# Patient Record
Sex: FEMALE | Race: WHITE | NOT HISPANIC OR LATINO | Employment: FULL TIME | URBAN - METROPOLITAN AREA
[De-identification: names, ages, dates, MRNs, and addresses within clinical notes are randomized per-mention and may not be internally consistent; named-entity substitution may affect disease eponyms.]

---

## 2023-04-24 ENCOUNTER — TELEMEDICINE (OUTPATIENT)
Dept: PRIMARY CARE | Facility: CLINIC | Age: 58
End: 2023-04-24
Payer: COMMERCIAL

## 2023-04-24 DIAGNOSIS — R05.1 ACUTE COUGH: ICD-10-CM

## 2023-04-24 DIAGNOSIS — J01.90 ACUTE NON-RECURRENT SINUSITIS, UNSPECIFIED LOCATION: ICD-10-CM

## 2023-04-24 DIAGNOSIS — J06.9 ACUTE URI: Primary | ICD-10-CM

## 2023-04-24 PROBLEM — E03.9 HYPOTHYROIDISM: Status: ACTIVE | Noted: 2023-04-24

## 2023-04-24 PROBLEM — J30.9 ALLERGIC RHINITIS: Status: ACTIVE | Noted: 2023-04-24

## 2023-04-24 PROBLEM — M79.7 FIBROMYALGIA: Status: ACTIVE | Noted: 2023-04-24

## 2023-04-24 PROBLEM — E11.9 DIABETES MELLITUS TYPE 2, UNCOMPLICATED (MULTI): Status: ACTIVE | Noted: 2023-04-24

## 2023-04-24 PROBLEM — E55.9 VITAMIN D DEFICIENCY: Status: ACTIVE | Noted: 2023-04-24

## 2023-04-24 PROBLEM — K21.9 GERD (GASTROESOPHAGEAL REFLUX DISEASE): Status: ACTIVE | Noted: 2023-04-24

## 2023-04-24 PROBLEM — K51.90 ULCERATIVE COLITIS (MULTI): Status: ACTIVE | Noted: 2023-04-24

## 2023-04-24 PROBLEM — E78.5 HYPERLIPIDEMIA: Status: ACTIVE | Noted: 2023-04-24

## 2023-04-24 PROBLEM — E53.8 B12 DEFICIENCY: Status: ACTIVE | Noted: 2023-04-24

## 2023-04-24 PROBLEM — R63.5 WEIGHT GAIN: Status: ACTIVE | Noted: 2023-04-24

## 2023-04-24 PROBLEM — F32.A DEPRESSION: Status: ACTIVE | Noted: 2023-04-24

## 2023-04-24 PROBLEM — F41.9 ANXIETY: Status: ACTIVE | Noted: 2023-04-24

## 2023-04-24 PROBLEM — M25.50 ARTHRALGIA: Status: ACTIVE | Noted: 2023-04-24

## 2023-04-24 PROBLEM — S83.206A TEAR OF MENISCUS OF RIGHT KNEE: Status: RESOLVED | Noted: 2023-04-24 | Resolved: 2023-04-24

## 2023-04-24 PROBLEM — D64.9 ANEMIA: Status: ACTIVE | Noted: 2023-04-24

## 2023-04-24 PROBLEM — M47.817 SPONDYLOSIS OF LUMBOSACRAL REGION: Status: ACTIVE | Noted: 2023-04-24

## 2023-04-24 PROBLEM — S83.249A MEDIAL MENISCUS TEAR: Status: RESOLVED | Noted: 2023-04-24 | Resolved: 2023-04-24

## 2023-04-24 PROBLEM — E66.9 OBESITY: Status: ACTIVE | Noted: 2023-04-24

## 2023-04-24 PROBLEM — R52 BODY ACHES: Status: RESOLVED | Noted: 2023-04-24 | Resolved: 2023-04-24

## 2023-04-24 PROCEDURE — 99214 OFFICE O/P EST MOD 30 MIN: CPT | Performed by: STUDENT IN AN ORGANIZED HEALTH CARE EDUCATION/TRAINING PROGRAM

## 2023-04-24 RX ORDER — ROSUVASTATIN CALCIUM 10 MG/1
1 TABLET, COATED ORAL DAILY
COMMUNITY
Start: 2022-03-13 | End: 2023-06-22 | Stop reason: SDUPTHER

## 2023-04-24 RX ORDER — PREGABALIN 200 MG/1
200 CAPSULE ORAL 3 TIMES DAILY
COMMUNITY
Start: 2019-09-26 | End: 2024-02-01 | Stop reason: SDUPTHER

## 2023-04-24 RX ORDER — CYANOCOBALAMIN 1000 UG/ML
1 INJECTION, SOLUTION INTRAMUSCULAR; SUBCUTANEOUS
COMMUNITY
Start: 2023-01-24 | End: 2023-06-22 | Stop reason: SDUPTHER

## 2023-04-24 RX ORDER — BENZONATATE 200 MG/1
200 CAPSULE ORAL 3 TIMES DAILY PRN
Qty: 42 CAPSULE | Refills: 0 | Status: SHIPPED | OUTPATIENT
Start: 2023-04-24 | End: 2023-05-24

## 2023-04-24 RX ORDER — FLUTICASONE PROPIONATE 50 MCG
1 SPRAY, SUSPENSION (ML) NASAL 2 TIMES DAILY
COMMUNITY
Start: 2016-04-05 | End: 2023-06-22 | Stop reason: SDUPTHER

## 2023-04-24 RX ORDER — AMOXICILLIN AND CLAVULANATE POTASSIUM 875; 125 MG/1; MG/1
875 TABLET, FILM COATED ORAL 2 TIMES DAILY
Qty: 20 TABLET | Refills: 0 | Status: SHIPPED | OUTPATIENT
Start: 2023-04-24 | End: 2023-05-04

## 2023-04-24 RX ORDER — AZELASTINE 1 MG/ML
SPRAY, METERED NASAL
COMMUNITY
Start: 2021-05-21 | End: 2023-06-22 | Stop reason: SDUPTHER

## 2023-04-24 RX ORDER — DULOXETIN HYDROCHLORIDE 30 MG/1
30 CAPSULE, DELAYED RELEASE ORAL DAILY
COMMUNITY
Start: 2023-04-20 | End: 2023-12-13 | Stop reason: SDUPTHER

## 2023-04-24 RX ORDER — DULOXETIN HYDROCHLORIDE 60 MG/1
1 CAPSULE, DELAYED RELEASE ORAL DAILY
COMMUNITY
Start: 2023-04-20 | End: 2023-12-13 | Stop reason: SDUPTHER

## 2023-04-24 RX ORDER — TIRZEPATIDE 12.5 MG/.5ML
INJECTION, SOLUTION SUBCUTANEOUS
COMMUNITY
Start: 2022-11-28 | End: 2023-11-02 | Stop reason: WASHOUT

## 2023-04-24 RX ORDER — PANTOPRAZOLE SODIUM 40 MG/1
40 TABLET, DELAYED RELEASE ORAL DAILY
COMMUNITY
Start: 2015-05-07 | End: 2023-06-22 | Stop reason: SDUPTHER

## 2023-04-24 RX ORDER — LISDEXAMFETAMINE DIMESYLATE 40 MG/1
CAPSULE ORAL
COMMUNITY
Start: 2023-03-31 | End: 2023-11-21 | Stop reason: WASHOUT

## 2023-04-24 RX ORDER — LEVOTHYROXINE SODIUM 88 UG/1
88 TABLET ORAL DAILY
COMMUNITY
Start: 2018-08-10 | End: 2023-05-05 | Stop reason: SDUPTHER

## 2023-04-24 RX ORDER — DOXYCYCLINE 150 MG/1
150 TABLET ORAL DAILY
COMMUNITY
Start: 2023-04-20

## 2023-04-24 NOTE — PROGRESS NOTES
Subjective   Patient ID: Paola Manley is a 58 y.o. female who presents for Sick.    HPI   Patient is calling into the office via a telemedicine virtual visit to discuss signs/symptoms of an upper respiratory infection    Stated that approximately last Tuesday, 6-7 days ago she noted a cough that was slightly productive, headache, fatigue, body aches, fever, bilateral ear pressure, and the overall feeling of being slightly ill    She has been using over-the-counter Mucinex, Tylenol, and other OTC remedies with minimal relief  Continues to use her nasal sprays as well with continued signs/symptoms    She tested negative for COVID-19 this past Saturday, 2 days ago, but continues to notice increased signs/symptoms of ear pressure and even sinus pressure    Continues to take doxycycline daily for her rosacea but asking now if she needs to be placed on another antibiotic or something similar to help her signs/symptoms    Asking for advice/recommendations    Review of Systems  All pertinent positive symptoms are included in the history of present illness.    All other systems have been reviewed and are negative and noncontributory to this patient's current ailments.  Objective   There were no vitals taken for this visit.    Physical Exam  CONSTITUTIONAL - well nourished, well developed, looks like stated age, in no acute distress, not ill-appearing, and not tired appearing  SKIN - normal skin color and pigmentation, normal skin turgor without rash, lesions, or nodules visualized  HEAD - no trauma, normocephalic  EYES - normal external exam  CHEST -no distressed breathing, good effort, coughing occasionally during the examination  EXTREMITIES - no edema, no deformities  NEUROLOGICAL - normal balance, normal motor, no ataxia  PSYCHIATRIC - alert, pleasant and cordial, age-appropriate    Assessment/Plan   We do long conversation about your signs/symptoms and I truly believe that this could be due to a bacterial infection at  this time  I recommend that we start Augmentin 875 mg to be taken over the next 7-10 days    I also added Tessalon Perles to help with your cough    Risks, benefits, and options of treatment(s) were discussed after reviewing all current medication(s) and drug allergy(ies)  I opted for the treatment that we discussed with instructions on the medication use for your underlying medical ailment(s)    I encouraged supportive care such as rest, fluids and Advil/Tylenol as warranted  Return to the clinic in 3-5 days or sooner if symptoms persist as we will then further evaluate    If emergency warning signs/symptoms occur such as trouble breathing, persistent pain or pressure in the chest, new confusion, inability to wake or stay awake, or bluish lips or face, you need to seek emergency medical care immediately.

## 2023-05-05 DIAGNOSIS — E03.9 HYPOTHYROIDISM, UNSPECIFIED TYPE: ICD-10-CM

## 2023-05-05 RX ORDER — LEVOTHYROXINE SODIUM 88 UG/1
88 TABLET ORAL DAILY
Qty: 90 TABLET | Refills: 0 | Status: SHIPPED | OUTPATIENT
Start: 2023-05-05 | End: 2023-12-15 | Stop reason: SDUPTHER

## 2023-06-22 ENCOUNTER — LAB (OUTPATIENT)
Dept: LAB | Facility: LAB | Age: 58
End: 2023-06-22
Payer: COMMERCIAL

## 2023-06-22 ENCOUNTER — OFFICE VISIT (OUTPATIENT)
Dept: PRIMARY CARE | Facility: CLINIC | Age: 58
End: 2023-06-22
Payer: COMMERCIAL

## 2023-06-22 VITALS
HEART RATE: 77 BPM | BODY MASS INDEX: 31.55 KG/M2 | SYSTOLIC BLOOD PRESSURE: 120 MMHG | WEIGHT: 208.2 LBS | DIASTOLIC BLOOD PRESSURE: 76 MMHG | HEIGHT: 68 IN

## 2023-06-22 DIAGNOSIS — J30.9 ALLERGIC RHINITIS, UNSPECIFIED SEASONALITY, UNSPECIFIED TRIGGER: ICD-10-CM

## 2023-06-22 DIAGNOSIS — E53.8 B12 DEFICIENCY: ICD-10-CM

## 2023-06-22 DIAGNOSIS — E11.9 TYPE 2 DIABETES MELLITUS WITHOUT COMPLICATION, WITHOUT LONG-TERM CURRENT USE OF INSULIN (MULTI): ICD-10-CM

## 2023-06-22 DIAGNOSIS — K51.919 ULCERATIVE COLITIS WITH COMPLICATION, UNSPECIFIED LOCATION (MULTI): ICD-10-CM

## 2023-06-22 DIAGNOSIS — E78.2 MIXED HYPERLIPIDEMIA: ICD-10-CM

## 2023-06-22 DIAGNOSIS — E03.9 HYPOTHYROIDISM, UNSPECIFIED TYPE: ICD-10-CM

## 2023-06-22 DIAGNOSIS — K59.00 CONSTIPATION, UNSPECIFIED CONSTIPATION TYPE: ICD-10-CM

## 2023-06-22 DIAGNOSIS — K21.9 GASTROESOPHAGEAL REFLUX DISEASE WITHOUT ESOPHAGITIS: ICD-10-CM

## 2023-06-22 DIAGNOSIS — E11.9 TYPE 2 DIABETES MELLITUS WITHOUT COMPLICATION, WITHOUT LONG-TERM CURRENT USE OF INSULIN (MULTI): Primary | ICD-10-CM

## 2023-06-22 LAB
ALANINE AMINOTRANSFERASE (SGPT) (U/L) IN SER/PLAS: 14 U/L (ref 7–45)
ALBUMIN (G/DL) IN SER/PLAS: 4.2 G/DL (ref 3.4–5)
ALKALINE PHOSPHATASE (U/L) IN SER/PLAS: 81 U/L (ref 33–110)
ANION GAP IN SER/PLAS: 10 MMOL/L (ref 10–20)
ASPARTATE AMINOTRANSFERASE (SGOT) (U/L) IN SER/PLAS: 19 U/L (ref 9–39)
BILIRUBIN TOTAL (MG/DL) IN SER/PLAS: 0.3 MG/DL (ref 0–1.2)
CALCIUM (MG/DL) IN SER/PLAS: 9.1 MG/DL (ref 8.6–10.3)
CARBON DIOXIDE, TOTAL (MMOL/L) IN SER/PLAS: 25 MMOL/L (ref 21–32)
CHLORIDE (MMOL/L) IN SER/PLAS: 110 MMOL/L (ref 98–107)
CHOLESTEROL (MG/DL) IN SER/PLAS: 147 MG/DL (ref 0–199)
CHOLESTEROL IN HDL (MG/DL) IN SER/PLAS: 39.2 MG/DL
CHOLESTEROL/HDL RATIO: 3.8
COBALAMIN (VITAMIN B12) (PG/ML) IN SER/PLAS: 190 PG/ML (ref 211–911)
CREATININE (MG/DL) IN SER/PLAS: 0.78 MG/DL (ref 0.5–1.05)
ESTIMATED AVERAGE GLUCOSE FOR HBA1C: 111 MG/DL
GFR FEMALE: 88 ML/MIN/1.73M2
GLUCOSE (MG/DL) IN SER/PLAS: 89 MG/DL (ref 74–99)
HEMOGLOBIN A1C/HEMOGLOBIN TOTAL IN BLOOD: 5.5 %
LDL: 67 MG/DL (ref 0–99)
NON HDL CHOLESTEROL: 108 MG/DL
POTASSIUM (MMOL/L) IN SER/PLAS: 4.4 MMOL/L (ref 3.5–5.3)
PROTEIN TOTAL: 6.5 G/DL (ref 6.4–8.2)
SODIUM (MMOL/L) IN SER/PLAS: 141 MMOL/L (ref 136–145)
THYROTROPIN (MIU/L) IN SER/PLAS BY DETECTION LIMIT <= 0.05 MIU/L: 0.42 MIU/L (ref 0.44–3.98)
THYROXINE (T4) FREE (NG/DL) IN SER/PLAS: 1.15 NG/DL (ref 0.61–1.12)
TRIGLYCERIDE (MG/DL) IN SER/PLAS: 204 MG/DL (ref 0–149)
UREA NITROGEN (MG/DL) IN SER/PLAS: 24 MG/DL (ref 6–23)
VLDL: 41 MG/DL (ref 0–40)

## 2023-06-22 PROCEDURE — 3078F DIAST BP <80 MM HG: CPT | Performed by: STUDENT IN AN ORGANIZED HEALTH CARE EDUCATION/TRAINING PROGRAM

## 2023-06-22 PROCEDURE — 80061 LIPID PANEL: CPT

## 2023-06-22 PROCEDURE — 83036 HEMOGLOBIN GLYCOSYLATED A1C: CPT

## 2023-06-22 PROCEDURE — 36415 COLL VENOUS BLD VENIPUNCTURE: CPT

## 2023-06-22 PROCEDURE — 82607 VITAMIN B-12: CPT

## 2023-06-22 PROCEDURE — 3044F HG A1C LEVEL LT 7.0%: CPT | Performed by: STUDENT IN AN ORGANIZED HEALTH CARE EDUCATION/TRAINING PROGRAM

## 2023-06-22 PROCEDURE — 3074F SYST BP LT 130 MM HG: CPT | Performed by: STUDENT IN AN ORGANIZED HEALTH CARE EDUCATION/TRAINING PROGRAM

## 2023-06-22 PROCEDURE — 99215 OFFICE O/P EST HI 40 MIN: CPT | Performed by: STUDENT IN AN ORGANIZED HEALTH CARE EDUCATION/TRAINING PROGRAM

## 2023-06-22 PROCEDURE — 80053 COMPREHEN METABOLIC PANEL: CPT

## 2023-06-22 PROCEDURE — 84443 ASSAY THYROID STIM HORMONE: CPT

## 2023-06-22 PROCEDURE — 84439 ASSAY OF FREE THYROXINE: CPT

## 2023-06-22 RX ORDER — FLUTICASONE PROPIONATE 50 MCG
1 SPRAY, SUSPENSION (ML) NASAL 2 TIMES DAILY
Qty: 18.2 G | Refills: 3 | Status: SHIPPED | OUTPATIENT
Start: 2023-06-22 | End: 2023-12-15 | Stop reason: SDUPTHER

## 2023-06-22 RX ORDER — CYANOCOBALAMIN 1000 UG/ML
1000 INJECTION, SOLUTION INTRAMUSCULAR; SUBCUTANEOUS
Qty: 1 ML | Refills: 11 | Status: SHIPPED | OUTPATIENT
Start: 2023-06-22 | End: 2023-12-15 | Stop reason: SDUPTHER

## 2023-06-22 RX ORDER — PANTOPRAZOLE SODIUM 40 MG/1
40 TABLET, DELAYED RELEASE ORAL DAILY
Qty: 90 TABLET | Refills: 1 | Status: SHIPPED | OUTPATIENT
Start: 2023-06-22 | End: 2023-12-15 | Stop reason: SDUPTHER

## 2023-06-22 RX ORDER — ROSUVASTATIN CALCIUM 10 MG/1
10 TABLET, COATED ORAL DAILY
Qty: 90 TABLET | Refills: 1 | Status: SHIPPED | OUTPATIENT
Start: 2023-06-22 | End: 2023-12-15 | Stop reason: SDUPTHER

## 2023-06-22 RX ORDER — AZELASTINE 1 MG/ML
1 SPRAY, METERED NASAL 2 TIMES DAILY
Qty: 30 ML | Refills: 3 | Status: SHIPPED | OUTPATIENT
Start: 2023-06-22 | End: 2023-12-15 | Stop reason: SDUPTHER

## 2023-06-22 NOTE — PROGRESS NOTES
Subjective   Patient ID: Paola Manley is a 58 y.o. female who presents for Diabetes, Allergic Rhinitis, Hyperlipidemia, Hypothyroidism, GERD, and Vitamin B12 Deficiency .  Today she is accompanied by alone.     HPI  1. Type 2 Diabetes  She was recently started on Mounjaro in November 2022 due to weight loss clinic.  Her last hemoglobin A1c was performed in January and showed a value of 5.7%  Not currently on an ACE inhibitor or ARB.  She is tolerating her medication.  Currently down 50+ pounds  Denies polydipsia or dry mouth.    2. Allergic rhinitis  She continues to use Flonase and Astelin as needed.  Requesting refills of both her Astelin and fluticasone  Denies sinus pressure, fever, chills, sore throat, chest pain, or shortness of breath.    3.  Constipation/ulcerative colitis  She is taking Linzess 290 mcg as needed.  Also noted that she is following up with Dr. Holman and recently established with them just a few days ago  She states that this helps when she notices that she is getting more constipated.  Requesting refills.    4. Hyperlipidemia  Currently taking rosuvastatin 10 mg daily.  She is tolerating the medication, denies myalgias.  Last cholesterol was great and stable with an LDL near 70    5. Hypothyroidism  Currently taking levothyroxine 88 mcg daily.  Last TSH performed earlier this year showed a very low TSH  Due to this we decrease to 88 mcg daily  This is most likely due to weight loss  Desires to have her TSH checked again.    6.  GERD  Currently takes pantoprazole 40 mg daily.  States that this medication continues to help with her symptoms, and knows when she forgets to take her medication that her symptoms returns.  Last endoscopy was in 2016  Again, currently following up with gastroenterology    7. Vitamin B12 deficiency  In the past she has been receiving vitamin B12 injections.  However, since the beginning of COVID-19 pandemic, she did not want to come to the office monthly and had  stopped getting her vitamin B12 shots.  Her B12 levels in October 2022 were low at 122.  She desires to restart her vitamin B12 injections.      Current Outpatient Medications on File Prior to Visit   Medication Sig Dispense Refill    azelastine (Astelin) 137 mcg (0.1 %) nasal spray Administer into affected nostril(s).      cyanocobalamin (Vitamin B-12) 1,000 mcg/mL injection Inject 1 mL (1,000 mcg) into the shoulder, thigh, or buttocks every 30 (thirty) days.      doxycycline (Adoxa) 150 mg tablet Take 1 tablet (150 mg) by mouth once daily.      DULoxetine (Cymbalta) 30 mg DR capsule Take 1 capsule (30 mg) by mouth once daily.      DULoxetine (Cymbalta) 60 mg DR capsule Take 1 capsule (60 mg) by mouth once daily.      fluticasone (Flonase) 50 mcg/actuation nasal spray Administer 1 spray into affected nostril(s) in the morning and 1 spray before bedtime.      levothyroxine (Synthroid, Levoxyl) 88 mcg tablet Take 1 tablet (88 mcg) by mouth once daily. 90 tablet 0    linaCLOtide (Linzess) 290 mcg capsule Take 1 capsule (290 mcg) by mouth once daily.      pantoprazole (ProtoNix) 40 mg EC tablet Take 1 tablet (40 mg) by mouth once daily.      pregabalin (Lyrica) 200 mg capsule Take 1 capsule (200 mg) by mouth in the morning and 1 capsule (200 mg) in the evening and 1 capsule (200 mg) before bedtime.      rosuvastatin (Crestor) 10 mg tablet Take 1 tablet (10 mg) by mouth once daily.      tirzepatide (Mounjaro) 12.5 mg/0.5 mL pen injector Inject under the skin.      Vyvanse 40 mg capsule TAKE ONE (1) CAPSULE EVERY MORNING       No current facility-administered medications on file prior to visit.        Allergies   Allergen Reactions    Bactrim [Sulfamethoxazole-Trimethoprim] Other    Cephalexin Unknown     GASTROINTESTIAL IRRITATION    Ciprofloxacin Unknown     Reaction from Community: Other(See Desc)    Darvocet A500 [Propoxyphene N-Acetaminophen] Other    Nsaids (Non-Steroidal Anti-Inflammatory Drug) Other    Vicodin  "[Hydrocodone-Acetaminophen] Other       Immunization History   Administered Date(s) Administered    Hib (PRP-D) 12/21/2012    Influenza, injectable, quadrivalent, preservative free 10/11/2017, 12/20/2018, 09/28/2020, 11/24/2021    Influenza, seasonal, injectable 01/07/2015, 10/06/2015    Influenza, seasonal, injectable, preservative free 11/12/2013    Meningococcal MCV4, Unspecified 12/21/2012    Moderna SARS-CoV-2 Vaccination 04/08/2021, 05/06/2021, 01/17/2022    Pneumococcal Conjugate PCV 7 12/21/2012    Tdap 11/11/2013    Zoster, Recombinant 01/17/2022, 01/24/2023         Review of Systems  All pertinent positive symptoms are included in the history of present illness.  All other systems have been reviewed and are negative and noncontributory to this patient's current ailments.     Objective   /76 (BP Location: Left arm, Patient Position: Sitting, BP Cuff Size: Adult)   Pulse 77   Ht 1.727 m (5' 8\")   Wt 94.4 kg (208 lb 3.2 oz)   BMI 31.66 kg/m²   BSA: 2.13 meters squared  No visits with results within 1 Month(s) from this visit.   Latest known visit with results is:   [facility]acy Encounter on 01/24/2023   Component Date Value Ref Range Status    ALBUMIN (MG/L) IN URINE 01/24/2023 <7.0  Not Established mg/L Final    Albumin/Creatine Ratio 01/24/2023 SEE COMMENT  0.0 - 30.0 ug/mg crt Final    Comment: One or more analytes used in this calculation   is outside of the analytical measurement range.  Calculation cannot be performed.      Creatinine, Urine 01/24/2023 64.0  20.0 - 320.0 mg/dL Final    Glucose 01/24/2023 86  74 - 99 mg/dL Final    Sodium 01/24/2023 140  136 - 145 mmol/L Final    Potassium 01/24/2023 4.6  3.5 - 5.3 mmol/L Final    Chloride 01/24/2023 107  98 - 107 mmol/L Final    Bicarbonate 01/24/2023 28  21 - 32 mmol/L Final    Anion Gap 01/24/2023 10  10 - 20 mmol/L Final    Urea Nitrogen 01/24/2023 19  6 - 23 mg/dL Final    Creatinine 01/24/2023 0.69  0.50 - 1.05 mg/dL Final    GFR Female " 01/24/2023 >90  >90 mL/min/1.73m2 Final    Comment:  CALCULATIONS OF ESTIMATED GFR ARE PERFORMED   USING THE 2021 CKD-EPI STUDY REFIT EQUATION   WITHOUT THE RACE VARIABLE FOR THE IDMS-TRACEABLE   CREATININE METHODS.    https://jasn.asnjournals.org/content/early/2021/09/22/ASN.0359354432      Calcium 01/24/2023 9.4  8.6 - 10.3 mg/dL Final    Albumin 01/24/2023 4.3  3.4 - 5.0 g/dL Final    Alkaline Phosphatase 01/24/2023 97  33 - 110 U/L Final    Total Protein 01/24/2023 7.4  6.4 - 8.2 g/dL Final    AST 01/24/2023 17  9 - 39 U/L Final    Total Bilirubin 01/24/2023 0.3  0.0 - 1.2 mg/dL Final    ALT (SGPT) 01/24/2023 13  7 - 45 U/L Final    Comment:  Patients treated with Sulfasalazine may generate    falsely decreased results for ALT.      TSH 01/24/2023 0.06 (L)  0.44 - 3.98 mIU/L Final    Comment:  TSH testing is performed using different testing    methodology at [facility] than at other    [facility] hospitals. Direct result comparisons should    only be made within the same method.      Hemoglobin A1C 01/24/2023 5.7 (A)  % Final    Comment:      Diagnosis of Diabetes-Adults   Non-Diabetic: < or = 5.6%   Increased risk for developing diabetes: 5.7-6.4%   Diagnostic of diabetes: > or = 6.5%  .       Monitoring of Diabetes                Age (y)     Therapeutic Goal (%)   Adults:          >18           <7.0   Pediatrics:    13-18           <7.5                   7-12           <8.0                   0- 6            7.5-8.5   American Diabetes Association. Diabetes Care 33(S1), Jan 2010.      Estimated Average Glucose 01/24/2023 117  MG/DL Final    Cholesterol 01/24/2023 137  0 - 199 mg/dL Final    Comment: .      AGE      DESIRABLE   BORDERLINE HIGH   HIGH     0-19 Y     0 - 169       170 - 199     >/= 200    20-24 Y     0 - 189       190 - 224     >/= 225         >24 Y     0 - 199       200 - 239     >/= 240   **All ranges are based on fasting samples. Specific   therapeutic targets will vary based on  patient-specific   cardiac risk.  .   Pediatric guidelines reference:Pediatrics 2011, 128(S5).   Adult guidelines reference: NCEP ATPIII Guidelines,     [place] 2001, 258:2486-97  .   Venipuncture immediately after or during the    administration of Metamizole may lead to falsely   low results. Testing should be performed immediately   prior to Metamizole dosing.      HDL 01/24/2023 47.8  mg/dL Final    Comment: .      AGE      VERY LOW   LOW     NORMAL    HIGH       0-19 Y       < 35   < 40     40-45     ----    20-24 Y       ----   < 40       >45     ----      >24 Y       ----   < 40     40-60      >60  .      Cholesterol/HDL Ratio 01/24/2023 2.9   Final    Comment: REF VALUES  DESIRABLE  < 3.4  HIGH RISK  > 5.0      LDL 01/24/2023 74  0 - 99 mg/dL Final    Comment: .                           NEAR      BORD      AGE      DESIRABLE  OPTIMAL    HIGH     HIGH     VERY HIGH     0-19 Y     0 - 109     ---    110-129   >/= 130     ----    20-24 Y     0 - 119     ---    120-159   >/= 160     ----      >24 Y     0 -  99   100-129  130-159   160-189     >/=190  .      VLDL 01/24/2023 15  0 - 40 mg/dL Final    Triglycerides 01/24/2023 77  0 - 149 mg/dL Final    Comment: .      AGE      DESIRABLE   BORDERLINE HIGH   HIGH     VERY HIGH   0 D-90 D    19 - 174         ----         ----        ----  91 D- 9 Y     0 -  74        75 -  99     >/= 100      ----    10-19 Y     0 -  89        90 - 129     >/= 130      ----    20-24 Y     0 - 114       115 - 149     >/= 150      ----         >24 Y     0 - 149       150 - 199    200- 499    >/= 500  .   Venipuncture immediately after or during the    administration of Metamizole may lead to falsely   low results. Testing should be performed immediately   prior to Metamizole dosing.      WBC 01/24/2023 8.0  4.4 - 11.3 x10E9/L Final    RBC 01/24/2023 4.00  4.00 - 5.20 x10E12/L Final    Hemoglobin 01/24/2023 12.6  12.0 - 16.0 g/dL Final    Hematocrit 01/24/2023 38.9  36.0 - 46.0 % Final     MCV 01/24/2023 97  80 - 100 fL Final    MCHC 01/24/2023 32.4  32.0 - 36.0 g/dL Final    Platelets 01/24/2023 284  150 - 450 x10E9/L Final    RDW 01/24/2023 14.8 (H)  11.5 - 14.5 % Final    Neutrophils % 01/24/2023 44.0  40.0 - 80.0 % Final    Immature Granulocytes %, Automated 01/24/2023 0.4  0.0 - 0.9 % Final    Comment:  Immature Granulocyte Count (IG) includes promyelocytes,    myelocytes and metamyelocytes but does not include bands.   Percent differential counts (%) should be interpreted in the   context of the absolute cell counts (cells/L).      Lymphocytes % 01/24/2023 41.1  13.0 - 44.0 % Final    Monocytes % 01/24/2023 8.1  2.0 - 10.0 % Final    Eosinophils % 01/24/2023 5.8  0.0 - 6.0 % Final    Basophils % 01/24/2023 0.6  0.0 - 2.0 % Final    Neutrophils Absolute 01/24/2023 3.52  1.20 - 7.70 x10E9/L Final    Lymphocytes Absolute 01/24/2023 3.28  1.20 - 4.80 x10E9/L Final    Monocytes Absolute 01/24/2023 0.65  0.10 - 1.00 x10E9/L Final    Eosinophils Absolute 01/24/2023 0.46  0.00 - 0.70 x10E9/L Final    Basophils Absolute 01/24/2023 0.05  0.00 - 0.10 x10E9/L Final       Physical Exam  CONSTITUTIONAL - well nourished, well developed, looks like stated age, in no acute distress, not ill-appearing, and not tired appearing  SKIN - normal skin color and pigmentation, normal skin turgor without rash, lesions, or nodules visualized  HEAD - no trauma, normocephalic  EYES - normal external exam  CHEST -no distressed breathing, good effort  EXTREMITIES - no edema, no deformities  NEUROLOGICAL - normal balance, normal motor, no ataxia  PSYCHIATRIC - alert, pleasant and cordial, age-appropriate    Assessment/Plan   1. Type 2 Diabetes  Continue Mounjaro from the weight loss clinic.  We have ordered a hemoglobin A1c today, we will notify you of the results and make recommendations accordingly.     2. Allergic rhinitis  happy to hear that your allergies appear to be well controlled with Flonase and Astelin.  I will send  in other prescriptions at this time    3.  Constipation  Refills provided for Linzess 290 mcg daily as needed.    4. Hyperlipidemia  Your cholesterol was within normal limits in January of this year  We did order a lipid panel and CMP today.  We will notify you of the results and make recommendations accordingly    5. Hypothyroidism  Happy to hear that your symptoms are feeling much better now, we will check your TSH levels to determine if we need to make modifications to your levothyroxine.  We will notify you of the results and make recommendations accordingly stable.  Otherwise, continue 88 mcg daily as currently prescribed    6. Esophageal reflux  Stable, continues to take pantoprazole 40 mg daily.    7. Vitamin B12 deficiency  Your vitamin B12 levels back in October 2022, were low.  I have sent a prescription to restart your vitamin B12 injections.  We will be happy to provide your injections in office.

## 2023-06-23 ENCOUNTER — TELEPHONE (OUTPATIENT)
Dept: PRIMARY CARE | Facility: CLINIC | Age: 58
End: 2023-06-23
Payer: COMMERCIAL

## 2023-06-23 NOTE — TELEPHONE ENCOUNTER
----- Message from Addy Storm, DO sent at 6/23/2023  6:45 AM EDT -----  Vitamin B12 low at 190 so I would recommend we start supplementation and we can easily provide these at the office    Free T4 elevated 1.15 and thyroid-stimulating hormone noted to be at the very low end of normal at 0.42  If the patient feels well, she can continue at 88 mcg daily but it may be even wise to decrease her further to 75 mcg daily    Please advise as she continues to lose weight and this could be affected once again    Cholesterol looks great at 147, HDL 39, LDL 67, triglycerides 204    Sugar, kidneys, liver, electrolytes are all within normal limits other than chloride being slightly elevated but I am not concerned    Hemoglobin A1c decreased tremendously now at 5.5%

## 2023-06-23 NOTE — RESULT ENCOUNTER NOTE
Vitamin B12 low at 190 so I would recommend we start supplementation and we can easily provide these at the office    Free T4 elevated 1.15 and thyroid-stimulating hormone noted to be at the very low end of normal at 0.42  If the patient feels well, she can continue at 88 mcg daily but it may be even wise to decrease her further to 75 mcg daily    Please advise as she continues to lose weight and this could be affected once again    Cholesterol looks great at 147, HDL 39, LDL 67, triglycerides 204    Sugar, kidneys, liver, electrolytes are all within normal limits other than chloride being slightly elevated but I am not concerned    Hemoglobin A1c decreased tremendously now at 5.5%

## 2023-06-29 ENCOUNTER — APPOINTMENT (OUTPATIENT)
Dept: PRIMARY CARE | Facility: CLINIC | Age: 58
End: 2023-06-29
Payer: COMMERCIAL

## 2023-08-04 ENCOUNTER — HOSPITAL ENCOUNTER (OUTPATIENT)
Dept: DATA CONVERSION | Facility: HOSPITAL | Age: 58
End: 2023-08-04
Attending: PHYSICAL MEDICINE & REHABILITATION | Admitting: PHYSICAL MEDICINE & REHABILITATION
Payer: COMMERCIAL

## 2023-08-04 DIAGNOSIS — M79.7 FIBROMYALGIA: ICD-10-CM

## 2023-08-04 DIAGNOSIS — M46.1 SACROILIITIS, NOT ELSEWHERE CLASSIFIED (CMS-HCC): ICD-10-CM

## 2023-09-08 ENCOUNTER — HOSPITAL ENCOUNTER (OUTPATIENT)
Dept: DATA CONVERSION | Facility: HOSPITAL | Age: 58
End: 2023-09-08
Attending: RADIOLOGY | Admitting: RADIOLOGY
Payer: COMMERCIAL

## 2023-09-08 DIAGNOSIS — K86.2 CYST OF PANCREAS (HHS-HCC): ICD-10-CM

## 2023-09-19 LAB — ALCOHOL (MG/DL) IN URINE: <10 MG/DL

## 2023-09-22 LAB
6-ACETYLMORPHINE: <25 NG/ML
7-AMINOCLONAZEPAM: <25 NG/ML
ALPHA-HYDROXYALPRAZOLAM: <25 NG/ML
ALPHA-HYDROXYMIDAZOLAM: <25 NG/ML
ALPRAZOLAM: <25 NG/ML
AMPHETAMINE (PRESENCE) IN URINE BY SCREEN METHOD: ABNORMAL
BARBITURATES PRESENCE IN URINE BY SCREEN METHOD: ABNORMAL
CANNABINOIDS IN URINE BY SCREEN METHOD: ABNORMAL
CHLORDIAZEPOXIDE: <25 NG/ML
CLONAZEPAM: <25 NG/ML
COCAINE (PRESENCE) IN URINE BY SCREEN METHOD: ABNORMAL
CODEINE: <50 NG/ML
CREATINE, URINE FOR DRUG: 98.1 MG/DL
DIAZEPAM: <25 NG/ML
DRUG SCREEN COMMENT URINE: ABNORMAL
EDDP: <25 NG/ML
ETHYL GLUCURONIDE SCREEN: NEGATIVE NG/ML
FENTANYL CONFIRMATION, URINE: <2.5 NG/ML
HYDROCODONE: <25 NG/ML
HYDROMORPHONE: <25 NG/ML
LORAZEPAM: <25 NG/ML
METHADONE CONFIRMATION,URINE: <25 NG/ML
MIDAZOLAM: <25 NG/ML
MORPHINE URINE: <50 NG/ML
N-DESMETHYLTAPENTADOL: <25 NG/ML
NORDIAZEPAM: <25 NG/ML
NORFENTANYL: <2.5 NG/ML
NORHYDROCODONE: <25 NG/ML
NOROXYCODONE: <25 NG/ML
O-DESMETHYLTRAMADOL: <50 NG/ML
OXAZEPAM: <25 NG/ML
OXYCODONE: <25 NG/ML
OXYMORPHONE: <25 NG/ML
PHENCYCLIDINE (PRESENCE) IN URINE BY SCREEN METHOD: ABNORMAL
TAPENTADOL: <25 NG/ML
TEMAZEPAM: <25 NG/ML
TRAMADOL: <50 NG/ML
ZOLPIDEM METABOLITE (ZCA): <25 NG/ML
ZOLPIDEM: <25 NG/ML

## 2023-09-25 LAB
AMPHETAMINES,URINE: 4500 NG/ML
BUPRENORPHINE ,URINE: <2 NG/ML
BUPRENORPHINE GLUC, URINE: <5 NG/ML
MDA,URINE: <200 NG/ML
MDEA,URINE: <200 NG/ML
MDMA,UR: <200 NG/ML
METHAMPHETAMINE QUANTITATIVE URINE: <200 NG/ML
NALOXONE, URINE: <100 NG/ML
NORBUPRENORPHINE GLUC,URINE: <5 NG/ML
NORBUPRENORPHINE, URINE: <2 NG/ML
PHENTERMINE,UR: <200 NG/ML

## 2023-09-29 VITALS
HEART RATE: 82 BPM | RESPIRATION RATE: 19 BRPM | TEMPERATURE: 98.1 F | DIASTOLIC BLOOD PRESSURE: 79 MMHG | SYSTOLIC BLOOD PRESSURE: 135 MMHG

## 2023-09-30 NOTE — H&P
History of Present Illness:   Pregnant/Lactating:  ·  Are You Pregnant no (1)   ·  Are You Currently Breastfeeding no     History Present Illness:  Reason for surgery: Pain   HPI:    Patient is a 58-year-old female past medical history significant for fibromyalgia, chronic low back pain who presents for follow-up visit since  September 2022 status post bilateral SI joint injections with significant pain relief for the past couple nights. Patient states her symptoms have reappeared over the past month and have been increasingly getting worse. Physical exam reveals SI joint  dysfunction which could be the cause of her worsening/aggravating of her low back pain. No radicular symptoms on examination or HPI thus less concerning for disc herniation or any nerve compression. Patient currently on Lyrica 200 mg 3 times daily for  fibromyalgia/neuropathic pain. Patient is also on Cymbalta which she has been taking without any side effects. We will continue both medications for her fibromyalgia treatment plan given patient's responsiveness and no side effect profile.     Plan for today:    -Given the patient responded well to bilateral SI joint injections in September 2022 with 9-month history of pain relief she would benefit from repeat injections. We discussed  the procedure, side effects, benefits and expectations with the patient and she was agreeable to proceed.     Allergies:        Allergies:  ·  Vicodin : Itching  ·  Darvocet-N  100: Itching  ·  cephalexin : Other  ·  ciprofloxacin : Unknown,  Medical Practices  ·  Darvocet  A500: Unknown, [facility]       Intolerances:  ·  Bactrim : GI Upset, Fatigue  ·  Keflex : GI Upset  ·  NSAIDs : GI Upset    Home Medication Review:   Home Medications Reviewed: yes     Impression/Procedure:   ·  Impression and Planned Procedure: Bilateral SI joint injections       ERAS (Enhanced Recovery After Surgery):  ·  ERAS Patient: no       Vital Signs:  Temperature C: 36.7 degrees C  "  Temperature F: 98 degrees F   Heart Rate: 82 beats per minute   Respiratory Rate: 19 breath per minute   Blood Pressure Systolic: 135 mm/Hg   Blood Pressure Diastolic: 79 mm/Hg     Physical Exam by System:    Constitutional: Well developed, awake/alert/oriented  x3, no distress, alert and cooperative   Eyes: PERRL, EOMI, clear sclera   ENMT: mucous membranes moist, no apparent injury,  no lesions seen   Respiratory/Thorax: Patent airways, CTAB, normal  breath sounds with good chest expansion, thorax symmetric   Cardiovascular: Regular, rate and rhythm, no murmurs,  2+ equal pulses of the extremities, normal S 1and S 2   Skin: Warm and dry, no lesions, no rashes     Consent:   COVID-19 Consent:  ·  COVID-19 Risk Consent Surgeon has reviewed key risks related to the risk of ankur COVID-19 and if they contract COVID-19 what the risks are.     Attestation:   Note Completion:  I am a:  Resident/Fellow   Attending Attestation I saw and evaluated the patient.  I personally obtained the key and critical portions of the history and physical exam or was physically present for key and  critical portions performed by the resident/fellow. I reviewed the resident/fellow?s documentation and discussed the patient with the resident/fellow.  I agree with the resident/fellow?s medical decision making as documented in the note.   I personally evaluated the patient on 04-Aug-2023         Electronic Signatures:  Gil Matthew (MD)  (Signed 04-Aug-2023 10:54)   Co-Signer: History of Present Illness, Allergies, Home  Medication Review, Impression/Procedure, ERAS, Physical Exam, Consent, Note Completion  Jonh Li (Fellow))  (Signed 04-Aug-2023 10:53)   Authored: History of Present Illness, Allergies, Home  Medication Review, Impression/Procedure, ERAS, Physical Exam, Consent, Note Completion      Last Updated: 04-Aug-2023 10:54 by Gil Matthew (MD)    References:  1.  Data Referenced From \"Patient Profile - " "Procedure\" 04-Aug-2023 09:56   "

## 2023-10-01 NOTE — OP NOTE
Post Operative Note:     PreOp Diagnosis: Bilateral sacroiliitis   Post-Procedure Diagnosis: Bilateral sacroiliitis   Procedure: 1.  Bilateral SI joint injection  2.   3.   4.   5.   Surgeon: Gil Matthew MD   Resident/Fellow/Other Assistant: Jonh Li MD   Estimated Blood Loss (mL): none   Specimen: no   Complications: none apparent   Findings: expected anatomy     Operative Report Dictated:  Dictation: not applicable - note contains Operative  Report   Operative Report:    Following informed consent, the patient was operating room and placed in the prone position. The low back area was prepped and draped with chlorhexidine in sterile  fashion.  Using fluoroscopic guidance, the skin and subcutaneous tissue overlying needle trajectory to the lower aspect of the sacroiliac joint was anesthetized with 3 cc of 0.5% Lidocaine.  A 23-gauge spinal needle was then advanced under fluoroscopic  guidance the lower aspect of the sacroiliac joint.  Injection of a small amount of contrast with appropriate intra-articular/periarticular spread.  Thereafter the below medications were injected and the needle was removed.  The patient was then transferred  to the recovery room in stable condition.    The patient is to continue with physical therapy/home exercises and update us on response/progress.    Laterality: [Bilateral]    Medication(s):  [3mL] [0.5% lidocaine] [40 mg methylprednisolone] divided between site(s)    Attestation:   Note Completion:  Attending Attestation I was present for the entire procedure         Electronic Signatures:  Gil Matthew)  (Signed 04-Aug-2023 11:05)   Authored: Post Operative Note, Note Completion      Last Updated: 04-Aug-2023 11:05 by Gil Matthew)

## 2023-10-28 PROBLEM — N20.0 RENAL CALCULI: Status: ACTIVE | Noted: 2023-10-28

## 2023-10-28 RX ORDER — MELATONIN 10 MG
CAPSULE ORAL
COMMUNITY

## 2023-10-28 RX ORDER — CLONIDINE HYDROCHLORIDE 0.2 MG/1
TABLET ORAL
COMMUNITY
Start: 2023-08-22

## 2023-10-30 ENCOUNTER — HOSPITAL ENCOUNTER (OUTPATIENT)
Dept: GASTROENTEROLOGY | Facility: HOSPITAL | Age: 58
Setting detail: OUTPATIENT SURGERY
Discharge: HOME | End: 2023-10-30
Payer: COMMERCIAL

## 2023-10-30 ENCOUNTER — ANESTHESIA EVENT (OUTPATIENT)
Dept: GASTROENTEROLOGY | Facility: HOSPITAL | Age: 58
End: 2023-10-30
Payer: COMMERCIAL

## 2023-10-30 ENCOUNTER — ANESTHESIA (OUTPATIENT)
Dept: GASTROENTEROLOGY | Facility: HOSPITAL | Age: 58
End: 2023-10-30
Payer: COMMERCIAL

## 2023-10-30 VITALS
WEIGHT: 200 LBS | RESPIRATION RATE: 19 BRPM | TEMPERATURE: 99.3 F | HEIGHT: 68 IN | DIASTOLIC BLOOD PRESSURE: 65 MMHG | SYSTOLIC BLOOD PRESSURE: 128 MMHG | OXYGEN SATURATION: 99 % | BODY MASS INDEX: 30.31 KG/M2 | HEART RATE: 57 BPM

## 2023-10-30 DIAGNOSIS — Z12.11 ENCOUNTER FOR SCREENING FOR MALIGNANT NEOPLASM OF COLON: ICD-10-CM

## 2023-10-30 LAB
GLUCOSE BLD MANUAL STRIP-MCNC: 79 MG/DL (ref 74–99)
GLUCOSE BLD MANUAL STRIP-MCNC: 80 MG/DL (ref 74–99)

## 2023-10-30 PROCEDURE — A45378 PR COLONOSCOPY,DIAGNOSTIC: Performed by: NURSE ANESTHETIST, CERTIFIED REGISTERED

## 2023-10-30 PROCEDURE — 2500000004 HC RX 250 GENERAL PHARMACY W/ HCPCS (ALT 636 FOR OP/ED): Performed by: NURSE ANESTHETIST, CERTIFIED REGISTERED

## 2023-10-30 PROCEDURE — 88305 TISSUE EXAM BY PATHOLOGIST: CPT | Mod: TC | Performed by: INTERNAL MEDICINE

## 2023-10-30 PROCEDURE — 3700000002 HC GENERAL ANESTHESIA TIME - EACH INCREMENTAL 1 MINUTE

## 2023-10-30 PROCEDURE — 3700000001 HC GENERAL ANESTHESIA TIME - INITIAL BASE CHARGE

## 2023-10-30 PROCEDURE — 7100000010 HC PHASE TWO TIME - EACH INCREMENTAL 1 MINUTE

## 2023-10-30 PROCEDURE — 45380 COLONOSCOPY AND BIOPSY: CPT | Performed by: INTERNAL MEDICINE

## 2023-10-30 PROCEDURE — 7100000009 HC PHASE TWO TIME - INITIAL BASE CHARGE

## 2023-10-30 PROCEDURE — 88305 TISSUE EXAM BY PATHOLOGIST: CPT | Performed by: PATHOLOGY

## 2023-10-30 PROCEDURE — 82947 ASSAY GLUCOSE BLOOD QUANT: CPT

## 2023-10-30 PROCEDURE — A45378 PR COLONOSCOPY,DIAGNOSTIC: Performed by: ANESTHESIOLOGY

## 2023-10-30 PROCEDURE — 88305 TISSUE EXAM BY PATHOLOGIST: CPT | Mod: TC,SUR | Performed by: INTERNAL MEDICINE

## 2023-10-30 RX ORDER — PROPOFOL 10 MG/ML
INJECTION, EMULSION INTRAVENOUS CONTINUOUS PRN
Status: DISCONTINUED | OUTPATIENT
Start: 2023-10-30 | End: 2023-10-30

## 2023-10-30 RX ORDER — PROPOFOL 10 MG/ML
INJECTION, EMULSION INTRAVENOUS AS NEEDED
Status: DISCONTINUED | OUTPATIENT
Start: 2023-10-30 | End: 2023-10-30

## 2023-10-30 RX ORDER — SODIUM CHLORIDE, SODIUM LACTATE, POTASSIUM CHLORIDE, CALCIUM CHLORIDE 600; 310; 30; 20 MG/100ML; MG/100ML; MG/100ML; MG/100ML
INJECTION, SOLUTION INTRAVENOUS CONTINUOUS PRN
Status: DISCONTINUED | OUTPATIENT
Start: 2023-10-30 | End: 2023-10-30

## 2023-10-30 RX ADMIN — PROPOFOL 150 MCG/KG/MIN: 10 INJECTION, EMULSION INTRAVENOUS at 11:10

## 2023-10-30 RX ADMIN — PROPOFOL 20 MG: 10 INJECTION, EMULSION INTRAVENOUS at 11:14

## 2023-10-30 RX ADMIN — PROPOFOL 60 MG: 10 INJECTION, EMULSION INTRAVENOUS at 11:10

## 2023-10-30 RX ADMIN — SODIUM CHLORIDE, POTASSIUM CHLORIDE, SODIUM LACTATE AND CALCIUM CHLORIDE: 600; 310; 30; 20 INJECTION, SOLUTION INTRAVENOUS at 11:07

## 2023-10-30 ASSESSMENT — COLUMBIA-SUICIDE SEVERITY RATING SCALE - C-SSRS
2. HAVE YOU ACTUALLY HAD ANY THOUGHTS OF KILLING YOURSELF?: NO
6. HAVE YOU EVER DONE ANYTHING, STARTED TO DO ANYTHING, OR PREPARED TO DO ANYTHING TO END YOUR LIFE?: NO
1. IN THE PAST MONTH, HAVE YOU WISHED YOU WERE DEAD OR WISHED YOU COULD GO TO SLEEP AND NOT WAKE UP?: NO

## 2023-10-30 ASSESSMENT — PAIN - FUNCTIONAL ASSESSMENT
PAIN_FUNCTIONAL_ASSESSMENT: 0-10

## 2023-10-30 ASSESSMENT — PAIN SCALES - GENERAL
PAINLEVEL_OUTOF10: 0 - NO PAIN

## 2023-10-30 ASSESSMENT — ENCOUNTER SYMPTOMS: CONSTITUTIONAL NEGATIVE: 1

## 2023-10-30 NOTE — H&P
History Of Present Illness  Paola Manley is a 58 y.o. female presenting with history of colon polyps .     Past Medical History  Past Medical History:   Diagnosis Date    Depression, unspecified     Depressive disorder    Medial meniscus tear 04/24/2023    Personal history of other diseases of the digestive system 01/12/2016    History of esophageal reflux    Personal history of other diseases of the respiratory system 08/13/2018    History of acute sinusitis    Personal history of other endocrine, nutritional and metabolic disease     History of hypothyroidism    Personal history of other specified conditions     History of insomnia    Prediabetes     Prediabetes    Tear of meniscus of right knee 04/24/2023       Surgical History  Past Surgical History:   Procedure Laterality Date    APPENDECTOMY  01/12/2016    Appendectomy    CHOLECYSTECTOMY  01/12/2016    Cholecystectomy    HERNIA REPAIR  01/12/2016    Inguinal Hernia Repair    HYSTERECTOMY  11/07/2017    Hysterectomy    MR HEAD ANGIO WO IV CONTRAST  2/6/2019    MR HEAD ANGIO WO IV CONTRAST 2/6/2019 [facility]    MR NECK ANGIO WO IV CONTRAST  2/6/2019    MR NECK ANGIO WO IV CONTRAST 2/6/2019 [facility]    OTHER SURGICAL HISTORY  08/12/2020    Knee arthroscopy    SINUS SURGERY  01/12/2016    Sinus Surgery    SPLENECTOMY, TOTAL  01/12/2016    Splenectomy    TOTAL ABDOMINAL HYSTERECTOMY W/ BILATERAL SALPINGOOPHORECTOMY  01/12/2016    Total Abdominal Hysterectomy With Removal Of Both Ovaries        Social History  She reports that she has been smoking cigarettes. She has never used smokeless tobacco. No history on file for alcohol use and drug use.    Family History  Family History   Problem Relation Name Age of Onset    Arthritis Mother      Heart disease Mother      Other (CVA) Mother      Coronary artery disease Mother      Diabetes Mother      Osteoporosis Mother      Alcohol abuse Father      Hypertension Father      Lung cancer Father       "Diabetes Maternal Grandmother      Liver cancer Maternal Grandmother      Throat cancer Maternal Grandfather      Osteoarthritis Other          Grandmother        Allergies  Bactrim [sulfamethoxazole-trimethoprim], Cephalexin, Ciprofloxacin, Darvocet a500 [propoxyphene n-acetaminophen], Nsaids (non-steroidal anti-inflammatory drug), and Vicodin [hydrocodone-acetaminophen]    Review of Systems   Constitutional: Negative.         Physical Exam  Vitals reviewed.   Constitutional:       Appearance: Normal appearance.   Cardiovascular:      Rate and Rhythm: Normal rate and regular rhythm.   Pulmonary:      Effort: Pulmonary effort is normal.   Abdominal:      Palpations: Abdomen is soft.   Neurological:      Mental Status: She is alert.          Last Recorded Vitals  Blood pressure 121/80, pulse 76, temperature 36.4 °C (97.5 °F), temperature source Temporal, resp. rate 16, height 1.727 m (5' 8\"), weight 90.7 kg (200 lb), SpO2 97 %.    Relevant Results             Assessment/Plan   Active Problems:  There are no active Hospital Problems.      Surveillance colonoscopy        I spent 10 minutes in the professional and overall care of this patient.      Gil Holman, DO    "

## 2023-10-30 NOTE — ANESTHESIA PREPROCEDURE EVALUATION
Patient: Paola Manley    Procedure Information       Anesthesia Start Date/Time: 10/30/23 1107    Scheduled providers: Gil Holman DO; ELTON Quintana-CRNA; Janice Peterson RN    Procedure: COLONOSCOPY    Location: [facility]            Relevant Problems   Cardiovascular   (+) Hyperlipidemia      Endocrine   (+) Diabetes mellitus type 2, uncomplicated (CMS/HCC)   (+) Hypothyroidism   (+) Obesity      GI   (+) GERD (gastroesophageal reflux disease)   (+) Ulcerative colitis (CMS/HCC)      /Renal   (+) Renal calculi      Neuro/Psych   (+) Anxiety   (+) Depression      Hematology   (+) Anemia      Musculoskeletal   (+) Fibromyalgia   (+) Spondylosis of lumbosacral region       Clinical information reviewed:    Allergies  Meds     OB Status           NPO Detail:  NPO/Void Status  Date of Last Liquid: 10/30/23  Time of Last Liquid: 0400  Date of Last Solid: 10/27/23  Time of Last Solid: 1830         Physical Exam    Airway  Mallampati: I     Cardiovascular - normal exam     Dental - normal exam     Pulmonary - normal exam     Abdominal - normal exam             Anesthesia Plan    ASA 2     MAC     intravenous induction   Postoperative administration of opioids is intended.  Anesthetic plan and risks discussed with patient.  Use of blood products discussed with patient who.    Plan discussed with CRNA.

## 2023-10-30 NOTE — DISCHARGE INSTRUCTIONS

## 2023-10-30 NOTE — POST-PROCEDURE NOTE
1146-Pt. Arrived to [facility] Anesthesia team at bedside vital signs WNL  1150- at bedside  1155-Dr. Holman at bedside  1200-Pt. Tolerating sips of water no signs or symptoms of nausea or vomiting  1220-Discharge instructions given to pt. And spouse they verbalize understanding.  1231-pt. Getting dressed  1243-pt. Discharged home with spouse

## 2023-10-30 NOTE — ANESTHESIA POSTPROCEDURE EVALUATION
Patient: Paola Manley    Procedure Summary       Date: 10/30/23 Room / Location: [facility]    Anesthesia Start: 1107 Anesthesia Stop: 1150    Procedure: COLONOSCOPY Diagnosis: Encounter for screening for malignant neoplasm of colon    Scheduled Providers: Gil Holman DO; ELTON Quintana-CRNA; Janice Peterson RN Responsible Provider: Sanchez Rodriguez MD    Anesthesia Type: MAC ASA Status: 2            Anesthesia Type: MAC    Vitals Value Taken Time   /65 10/30/23 1218   Temp 37.4 °C (99.3 °F) 10/30/23 1146   Pulse 57 10/30/23 1218   Resp 19 10/30/23 1218   SpO2 99 % 10/30/23 1146       Anesthesia Post Evaluation    Patient location during evaluation: bedside  Patient participation: complete - patient participated  Level of consciousness: awake  Pain management: adequate  Airway patency: patent  Cardiovascular status: acceptable  Respiratory status: acceptable  Hydration status: acceptable        No notable events documented.

## 2023-10-31 ASSESSMENT — PAIN SCALES - GENERAL: PAINLEVEL_OUTOF10: 0 - NO PAIN

## 2023-11-01 ENCOUNTER — TELEPHONE (OUTPATIENT)
Dept: SURGERY | Facility: CLINIC | Age: 58
End: 2023-11-01
Payer: COMMERCIAL

## 2023-11-01 DIAGNOSIS — E11.9 TYPE 2 DIABETES MELLITUS WITHOUT COMPLICATION, WITHOUT LONG-TERM CURRENT USE OF INSULIN (MULTI): Primary | ICD-10-CM

## 2023-11-01 NOTE — TELEPHONE ENCOUNTER
Spoke with patient, name and  verified:     Patient has not been seen since July and would like to know if she is able to get a rx refill for Mounjaro 15 mg, sent to [facility] [place], [place].     Patient is in the process of establishing care with endocrinology. Please advise.

## 2023-11-02 RX ORDER — TIRZEPATIDE 15 MG/.5ML
15 INJECTION, SOLUTION SUBCUTANEOUS
Qty: 2 ML | Refills: 1 | Status: SHIPPED | OUTPATIENT
Start: 2023-11-02 | End: 2023-12-15 | Stop reason: SDUPTHER

## 2023-11-03 DIAGNOSIS — E78.2 MIXED HYPERLIPIDEMIA: ICD-10-CM

## 2023-11-03 DIAGNOSIS — E55.9 VITAMIN D DEFICIENCY: ICD-10-CM

## 2023-11-03 DIAGNOSIS — E53.8 B12 DEFICIENCY: ICD-10-CM

## 2023-11-03 DIAGNOSIS — E03.9 HYPOTHYROIDISM, UNSPECIFIED TYPE: ICD-10-CM

## 2023-11-03 DIAGNOSIS — E11.9 TYPE 2 DIABETES MELLITUS WITHOUT COMPLICATION, WITHOUT LONG-TERM CURRENT USE OF INSULIN (MULTI): ICD-10-CM

## 2023-11-10 LAB
LABORATORY COMMENT REPORT: NORMAL
PATH REPORT.COMMENTS IMP SPEC: NORMAL
PATH REPORT.FINAL DX SPEC: NORMAL
PATH REPORT.GROSS SPEC: NORMAL
PATH REPORT.TOTAL CANCER: NORMAL

## 2023-11-15 ENCOUNTER — TELEPHONE (OUTPATIENT)
Dept: GASTROENTEROLOGY | Facility: CLINIC | Age: 58
End: 2023-11-15
Payer: COMMERCIAL

## 2023-11-15 NOTE — TELEPHONE ENCOUNTER
Called again and left voice mail.  Gil Holman DO      Call returned.  Left voice mail.    Gil Holman DO      ----- Message from Pam Bravo RN sent at 11/14/2023  3:48 PM EST -----  Regarding: Path report  and medication  Hi Dr. Holman,    She would like to speak with you regarding her path report from Colonoscopy and a new medication.    Thank you,    Pam

## 2023-11-21 ENCOUNTER — TELEMEDICINE (OUTPATIENT)
Dept: GASTROENTEROLOGY | Facility: CLINIC | Age: 58
End: 2023-11-21
Payer: COMMERCIAL

## 2023-11-21 DIAGNOSIS — K51.20 ULCERATIVE PROCTITIS WITHOUT COMPLICATION (MULTI): Primary | ICD-10-CM

## 2023-11-21 DIAGNOSIS — D12.6 COLON ADENOMAS: ICD-10-CM

## 2023-11-21 DIAGNOSIS — K59.04 CHRONIC IDIOPATHIC CONSTIPATION: ICD-10-CM

## 2023-11-21 PROCEDURE — 99213 OFFICE O/P EST LOW 20 MIN: CPT | Performed by: INTERNAL MEDICINE

## 2023-11-21 RX ORDER — PRUCALOPRIDE 2 MG/1
2 TABLET, FILM COATED ORAL DAILY
Qty: 30 TABLET | Refills: 2 | Status: SHIPPED | OUTPATIENT
Start: 2023-11-21 | End: 2023-12-15 | Stop reason: ALTCHOICE

## 2023-11-21 ASSESSMENT — ENCOUNTER SYMPTOMS: CONSTITUTIONAL NEGATIVE: 1

## 2023-11-21 NOTE — PROGRESS NOTES
Subjective   Patient ID: Paola Manley is a 58 y.o. female who presents for No chief complaint on file..  She is following up after her recent colonoscopy which had a small adenoma and mild proctitis    Usually on constipation side and using Linzess for 6 years   Not working as well as she hoped  Still on psyllium fiber  Constipation may last several (3 to 4 days) and result in difficult BM that tears her up in the rectum due to large stool ball    Urgency after lack of BM   Colitis diagnosed back in 1975 age 9   Last flare of severe colitis more than 8-9 years treated with suppositories     Discussed interval for routine colonoscopy in setting of distal UC - colon polyp was not in area of inflammation   Last exam was 3 years ago                Review of Systems   Constitutional: Negative.        Objective   Physical Exam  Constitutional:       Appearance: Normal appearance.   Neurological:      Mental Status: She is alert.         Assessment/Plan   Problem List Items Addressed This Visit             ICD-10-CM    Ulcerative colitis (CMS/HCC) - Primary K51.90    Colon adenomas D12.6    Chronic idiopathic constipation K59.04    Relevant Medications    prucalopride (Motegrity) 2 mg tablet

## 2023-11-21 NOTE — PATIENT INSTRUCTIONS
I recommend you have a colonoscopy every 2 to 3 years based on your polyps and long standing colitis.  Every year exam seems too frequent to me.      We will stop your Linzess and ask for a trial of Motegrity (prior authorization required).

## 2023-11-29 ENCOUNTER — HOSPITAL ENCOUNTER (OUTPATIENT)
Dept: RADIOLOGY | Facility: HOSPITAL | Age: 58
Discharge: HOME | End: 2023-11-29
Payer: COMMERCIAL

## 2023-11-29 ENCOUNTER — OFFICE VISIT (OUTPATIENT)
Dept: ORTHOPEDIC SURGERY | Facility: CLINIC | Age: 58
End: 2023-11-29
Payer: COMMERCIAL

## 2023-11-29 ENCOUNTER — APPOINTMENT (OUTPATIENT)
Dept: ORTHOPEDIC SURGERY | Facility: CLINIC | Age: 58
End: 2023-11-29
Payer: COMMERCIAL

## 2023-11-29 DIAGNOSIS — M17.12 ARTHRITIS OF LEFT KNEE: Primary | ICD-10-CM

## 2023-11-29 DIAGNOSIS — M25.562 LEFT KNEE PAIN, UNSPECIFIED CHRONICITY: ICD-10-CM

## 2023-11-29 DIAGNOSIS — N20.0 CALCULUS OF KIDNEY: ICD-10-CM

## 2023-11-29 DIAGNOSIS — S83.242A ACUTE MEDIAL MENISCUS TEAR OF LEFT KNEE, INITIAL ENCOUNTER: ICD-10-CM

## 2023-11-29 PROCEDURE — 3044F HG A1C LEVEL LT 7.0%: CPT | Performed by: ORTHOPAEDIC SURGERY

## 2023-11-29 PROCEDURE — 74018 RADEX ABDOMEN 1 VIEW: CPT | Mod: FY

## 2023-11-29 PROCEDURE — 73564 X-RAY EXAM KNEE 4 OR MORE: CPT | Mod: LT,FY

## 2023-11-29 PROCEDURE — 74018 RADEX ABDOMEN 1 VIEW: CPT | Performed by: STUDENT IN AN ORGANIZED HEALTH CARE EDUCATION/TRAINING PROGRAM

## 2023-11-29 PROCEDURE — 99214 OFFICE O/P EST MOD 30 MIN: CPT | Performed by: ORTHOPAEDIC SURGERY

## 2023-11-29 PROCEDURE — 73564 X-RAY EXAM KNEE 4 OR MORE: CPT | Mod: LEFT SIDE | Performed by: RADIOLOGY

## 2023-12-01 PROCEDURE — 20610 DRAIN/INJ JOINT/BURSA W/O US: CPT | Performed by: ORTHOPAEDIC SURGERY

## 2023-12-01 RX ORDER — LIDOCAINE HYDROCHLORIDE 5 MG/ML
4 INJECTION, SOLUTION INFILTRATION; PERINEURAL
Status: COMPLETED | OUTPATIENT
Start: 2023-12-01 | End: 2023-12-01

## 2023-12-01 RX ORDER — TRIAMCINOLONE ACETONIDE 40 MG/ML
40 INJECTION, SUSPENSION INTRA-ARTICULAR; INTRAMUSCULAR
Status: COMPLETED | OUTPATIENT
Start: 2023-12-01 | End: 2023-12-01

## 2023-12-01 RX ADMIN — TRIAMCINOLONE ACETONIDE 40 MG: 40 INJECTION, SUSPENSION INTRA-ARTICULAR; INTRAMUSCULAR at 15:33

## 2023-12-01 RX ADMIN — LIDOCAINE HYDROCHLORIDE 4 ML: 5 INJECTION, SOLUTION INFILTRATION; PERINEURAL at 15:33

## 2023-12-01 NOTE — PROGRESS NOTES
50-year-old female with continued left knee pain.  The pain is worse in the left knee.  She has failed anti-inflammatories physical therapy and steroid injections in the past    Patients' self reported past medical history, medications, allergies, surgical history, family and social history as well as a 10 point review of systems has been documented in the new patient intake form and scanned into the patient's electronic medical record.  The intake form was reviewed by Dr Canela during the office visit and signed by Dr. Canela and the patient.  Pertinent findings are documented in the HPI.    General Multi-System Physical Exam:  Constitutional  General appearance:  Alert, oriented, and in no acute distress.  Well developed, well nourished.  Head and Face  Head and face:  Normocephalic and atraumatic.  Ears, Nose, Mouth, and Throat  External inspection of ears and nose: Normal.  Eyes:  Pupils are equal and round.  Neck  Neck:  no neck mass was observed.  Pulmonary  Respiratory effort:  no respiratory distress.  Cardiovascular  Intact distal pulses.  Lymphatic  Palpation of lymph nodes in the affected extremity:  Normal.  Skin  Skin and subcutaneous tissue:  Normal skin color and pigmentation.  Normal skin turgor.  No rashes.  Neurologic  Sensation:  normal to light touch.  Psychiatric  Judgement and insight:  Intact.  Mood and affect:  Normal.  Musculoskeletal  Left knee with significant medial joint line tenderness no lateral joint line tenderness full range of motion    X-rays of the patient were ordered by Dr Canela and obtained today.  Dr Canela personally reviewed the results of the x-rays.    In addition, Dr Canela independently interpreted the patient's x-rays (performed by the Radiology department) by viewing the x-ray images and this is Dr. Canela's personal interpretation:     Left knee severe arthritis    We talked about the arthritis in the patient's knee.  Nonoperative and operative treatments for knee  arthritis include weight loss, physical therapy, anti-inflammatory medications, steroid injections, viscosupplementation injections, bracing, walking aids such as a cane, and partial or total knee replacements.  Knee arthritis is a progressive disease and while we cannot reverse the stages of arthritis, we hope to make the patient more comfortable.  I answered all of the patients questions in relation to their knee and arthritis in general.    At this point, we will continue to treat this patient's knee pain conservatively.  We gave the patient a steroid injection the knee today as well as a prescription for physical therapy.  We will have the patient take anti-inflammatories medications on an as needed basis.  We also gave the patient the information for my partner, Dr. Josue Carlin, who specializes in knee & hip arthritis and knee & hip replacements so the patient can follow-up with Dr Carlin for the arthritis in their knee on an as-needed basis.    Patient ID: Paola Manley is a 58 y.o. female.    L Inj/Asp: L knee on 12/1/2023 3:33 PM  Indications: pain  Details: 22 G needle, anterolateral approach  Medications: 40 mg triamcinolone acetonide 40 mg/mL; 4 mL lidocaine 5 mg/mL (0.5 %)  Outcome: tolerated well, no immediate complications  Procedure, treatment alternatives, risks and benefits explained, specific risks discussed. Consent was given by the patient. Immediately prior to procedure a time out was called to verify the correct patient, procedure, equipment, support staff and site/side marked as required. Patient was prepped and draped in the usual sterile fashion.

## 2023-12-13 ENCOUNTER — OFFICE VISIT (OUTPATIENT)
Dept: PAIN MEDICINE | Facility: HOSPITAL | Age: 58
End: 2023-12-13
Payer: COMMERCIAL

## 2023-12-13 VITALS
WEIGHT: 204.9 LBS | HEIGHT: 68 IN | HEART RATE: 78 BPM | SYSTOLIC BLOOD PRESSURE: 125 MMHG | BODY MASS INDEX: 31.05 KG/M2 | RESPIRATION RATE: 18 BRPM | DIASTOLIC BLOOD PRESSURE: 74 MMHG

## 2023-12-13 DIAGNOSIS — M79.7 FIBROMYALGIA: Primary | ICD-10-CM

## 2023-12-13 DIAGNOSIS — M25.50 ARTHRALGIA, UNSPECIFIED JOINT: ICD-10-CM

## 2023-12-13 PROBLEM — M46.1 SACROILIITIS (CMS-HCC): Status: ACTIVE | Noted: 2023-12-13

## 2023-12-13 PROCEDURE — 99214 OFFICE O/P EST MOD 30 MIN: CPT | Performed by: CLINICAL NURSE SPECIALIST

## 2023-12-13 PROCEDURE — 3044F HG A1C LEVEL LT 7.0%: CPT | Performed by: CLINICAL NURSE SPECIALIST

## 2023-12-13 PROCEDURE — 82607 VITAMIN B-12: CPT

## 2023-12-13 PROCEDURE — 3074F SYST BP LT 130 MM HG: CPT | Performed by: CLINICAL NURSE SPECIALIST

## 2023-12-13 PROCEDURE — 80053 COMPREHEN METABOLIC PANEL: CPT

## 2023-12-13 PROCEDURE — 82306 VITAMIN D 25 HYDROXY: CPT

## 2023-12-13 PROCEDURE — 84439 ASSAY OF FREE THYROXINE: CPT

## 2023-12-13 PROCEDURE — 3078F DIAST BP <80 MM HG: CPT | Performed by: CLINICAL NURSE SPECIALIST

## 2023-12-13 PROCEDURE — 80061 LIPID PANEL: CPT

## 2023-12-13 PROCEDURE — 83036 HEMOGLOBIN GLYCOSYLATED A1C: CPT

## 2023-12-13 PROCEDURE — 84443 ASSAY THYROID STIM HORMONE: CPT

## 2023-12-13 RX ORDER — DULOXETIN HYDROCHLORIDE 60 MG/1
60 CAPSULE, DELAYED RELEASE ORAL DAILY
Qty: 30 CAPSULE | Refills: 5 | Status: SHIPPED | OUTPATIENT
Start: 2023-12-13 | End: 2024-06-06 | Stop reason: SDUPTHER

## 2023-12-13 RX ORDER — DULOXETIN HYDROCHLORIDE 30 MG/1
30 CAPSULE, DELAYED RELEASE ORAL DAILY
Qty: 30 CAPSULE | Refills: 5 | Status: SHIPPED | OUTPATIENT
Start: 2023-12-13 | End: 2024-06-06 | Stop reason: SDUPTHER

## 2023-12-13 ASSESSMENT — PATIENT HEALTH QUESTIONNAIRE - PHQ9
1. LITTLE INTEREST OR PLEASURE IN DOING THINGS: NOT AT ALL
SUM OF ALL RESPONSES TO PHQ9 QUESTIONS 1 AND 2: 0
2. FEELING DOWN, DEPRESSED OR HOPELESS: NOT AT ALL

## 2023-12-13 ASSESSMENT — ENCOUNTER SYMPTOMS
DEPRESSION: 0
LOSS OF SENSATION IN FEET: 0
OCCASIONAL FEELINGS OF UNSTEADINESS: 0

## 2023-12-13 ASSESSMENT — PAIN SCALES - GENERAL: PAINLEVEL: 3

## 2023-12-13 NOTE — PROGRESS NOTES
Follow Up Visit    Location of Pain: lower back pain radiating to low center. Also radiates bilat hips to knees.  Last injection within 2 mths. States helped  Wants to have duloxetine ordered.         Pain Score: 3/10    Treatment:  lyrica   Last dose: 200 mg TID  Medication Count:    Fill date:    Efficacy: 90%    Side Effects: none    Narcan:    Other pain medication/neuromodulator: tylenol arthritis 5 days/week    Therapeutic Goals: aerobics    Therapeutic Assessment:    Injections and/or Procedures: within past 2 mths    Other:    Genetic Swab:  Urine Screen: 9/19/23  Narcotics Agreement:9/19/23  ORT:  PDUQ:  OA 6/26/23    Subjective   Patient ID: Paola Manley is a 58 y.o. female who presents for sacroiliitis, lumbar pain, fibromyalgia    HPI  58-year-old female with history of low back pain, sacroiliitis and fibromyalgia who previously did very well with bilateral SI injections with greater than 9 months lasting relief. Maintained on duloxetine and Lyrica. Added orphenadrine for muscle tightness, spasm.  Added [facility] electrotherapy and a formal course of PT.  Repeated bilateral SI injections on 8/4/2023 with continued relief.  Presents at today's office visit with persistent total body/myofascial pain and low back pain occasionally radiating into hips.  She denies any pain radiating into lower extremities.  Occasional numbness/tingling in her toes.  She denies weakness to lower extremities or changes in bowel/bladder function.  Pain described as aching and dull.  Pain increased with standing for long periods of time, bending.  Patient continues to endorse greater than 95% relief from most recent SI injection on 8/4/2023.  She has noted intermittent increase in MSK/myofascial type pain most noted in thoracic and lumbar region.  Patient did not complete the formal course of physical therapy; she continues consistent home exercises.  She was unable to get her next wave electrotherapy unit and is awaiting  insurance approval.  Continues to benefit from Lyrica 200 mg 3 times daily, duloxetine which she has been taking 90 mg daily.  Per patient the patient psychiatrist is okay with 90 mg of duloxetine daily and would like our office to take over this medication.  Patient feels that the addition of Skelaxin has been beneficial for muscle tightness and spasms. She will supplement with occasional Tylenol.  Continues to utilize lidocaine patches as needed.      OARRS:  Margarita Cole, APRN-CNP, APRN-CNS on 12/13/2023  8:57 AM  I have personally reviewed the OARRS report for Paola Manley. I have considered the risks of abuse, dependence, addiction and diversion    Is the patient prescribed a combination of a benzodiazepine and opioid?  No    Last Urine Drug Screen / ordered today: No  Recent Results (from the past 8760 hour(s))   Amphetamine Confirm, Urine    Collection Time: 09/19/23  8:30 AM   Result Value Ref Range    Amphetamines,Urine 4,500 ng/mL    MDA, Urine <200 ng/mL    MDEA, Urine <200 ng/mL    MDMA, Urine <200 ng/mL    Methamphetamine Quant, Ur <200 ng/mL    Phentermine,Urine <200 ng/mL   OPIATE/OPIOID/BENZO PRESCRIPTION COMPLIANCE    Collection Time: 09/19/23  8:30 AM   Result Value Ref Range    DRUG SCREEN COMMENT URINE SEE BELOW     Creatine, Urine 98.1 mg/dL    Amphetamine Screen, Urine PRESUMPTIVE POSITIVE (A) NEGATIVE    Barbiturate Screen, Urine PRESUMPTIVE NEGATIVE NEGATIVE    Cannabinoid Screen, Urine PRESUMPTIVE NEGATIVE NEGATIVE    Cocaine Screen, Urine PRESUMPTIVE NEGATIVE NEGATIVE    PCP Screen, Urine PRESUMPTIVE NEGATIVE NEGATIVE    7-Aminoclonazepam <25 Cutoff <25 ng/mL    Alpha-Hydroxyalprazolam <25 Cutoff <25 ng/mL    Alpha-Hydroxymidazolam <25 Cutoff <25 ng/mL    Alprazolam <25 Cutoff <25 ng/mL    Chlordiazepoxide <25 Cutoff <25 ng/mL    Clonazepam <25 Cutoff <25 ng/mL    Diazepam <25 Cutoff <25 ng/mL    Lorazepam <25 Cutoff <25 ng/mL    Midazolam <25 Cutoff <25 ng/mL    Nordiazepam <25 Cutoff  <25 ng/mL    Oxazepam <25 Cutoff <25 ng/mL    Temazepam <25 Cutoff <25 ng/mL    Zolpidem <25 Cutoff <25 ng/mL    Zolpidem Metabolite (ZCA) <25 Cutoff <25 ng/mL    6-Acetylmorphine <25 Cutoff <25 ng/mL    Codeine <50 Cutoff <50 ng/mL    Hydrocodone <25 Cutoff <25 ng/mL    Hydromorphone <25 Cutoff <25 ng/mL    Morphine Urine <50 Cutoff <50 ng/mL    Norhydrocodone <25 Cutoff <25 ng/mL    Noroxycodone <25 Cutoff <25 ng/mL    Oxycodone <25 Cutoff <25 ng/mL    Oxymorphone <25 Cutoff <25 ng/mL    Tramadol <50 Cutoff <50 ng/mL    O-Desmethyltramadol <50 Cutoff <50 ng/mL    Fentanyl <2.5 Cutoff<2.5 ng/mL    Norfentanyl <2.5 Cutoff<2.5 ng/mL    METHADONE CONFIRMATION,URINE <25 Cutoff <25 ng/mL    EDDP <25 Cutoff <25 ng/mL   Buprenorphine Confirm,Urine    Collection Time: 09/19/23  8:30 AM   Result Value Ref Range    BUPRENORPHINE GLUC, URINE <5 ng/mL    BUPRENORPHINE ,URINE <2 ng/mL    NALOXONE, URINE <100 ng/mL    NORBUPRENORPHINE GLUC,URINE <5 ng/mL    NORBUPRENORPHINE, URINE <2 ng/mL   Tapentadol Confirmation, Urine    Collection Time: 09/19/23  8:30 AM   Result Value Ref Range    Tapentadol <25 Cutoff <25 ng/mL    N-Desmethyltapentadol <25 Cutoff <25 ng/mL     Results are as expected.     Controlled Substance Agreement:  Date of the Last Agreement: 9/19/23  Reviewed Controlled Substance Agreement including but not limited to the benefits, risks, and alternatives to treatment with a Controlled Substance medication(s).    Monitoring and compliance:    ORT:    PDUQ:    Office Agreement: 6/26/23      Review of Systems    ROS:   General: No fevers, chills, weight loss  Skin: Negative for lesions  Eyes: No acute vision changes  Ears: No vertigo  Nose, mouth, throat: No difficulty swallowing or speaking  Respiratory: No cough, shortness of breath, cyanosis  Cardiovascular: Negative for chest pain syncope or palpitation  Gastrointestinal: No constipation, nausea, vomiting  Neurological: Negative for headache, positive for:  Intermittent paresthesia bilateral toes  Psychological: Negative for severe or debilitating anxiety, depression. Negative memory loss  Musculoskeletal: Positive for arthralgia, myalgia and pain endocrine: Negative for weight gain, appetite changes, excessive sweating  Allergy/immune: Negative    All 13 systems were reviewed and are within normal levels except as noted or in the history of present illness.  Positive or pertinent negative responses are noted or were in the history of present illness. As noted, the patient denies significant or impairing weakness in the bilateral upper and lower extremities, medication induced constipation, and bowel or bladder incontinence.     Current Outpatient Medications:     azelastine (Astelin) 137 mcg (0.1 %) nasal spray, Administer 1 spray into each nostril 2 times a day., Disp: 30 mL, Rfl: 3    cloNIDine (Catapres) 0.2 mg tablet, TAKE ONE (1) TABLET AT BEDTIME, Disp: , Rfl:     cyanocobalamin (Vitamin B-12) 1,000 mcg/mL injection, Inject 1 mL (1,000 mcg) into the shoulder, thigh, or buttocks every 30 (thirty) days., Disp: 1 mL, Rfl: 11    doxycycline (Adoxa) 150 mg tablet, Take 1 tablet (150 mg) by mouth once daily., Disp: , Rfl:     fluticasone (Flonase) 50 mcg/actuation nasal spray, Administer 1 spray into each nostril 2 times a day., Disp: 18.2 g, Rfl: 3    levothyroxine (Synthroid, Levoxyl) 88 mcg tablet, Take 1 tablet (88 mcg) by mouth once daily., Disp: 90 tablet, Rfl: 0    linaCLOtide (Linzess) 290 mcg capsule, Take 1 capsule (290 mcg) by mouth once daily in the morning. Take before meals. Do not crush or chew., Disp: , Rfl:     melatonin 10 mg capsule, TAKE 1 CAPSULE Bedtime PRN, Disp: , Rfl:     pantoprazole (ProtoNix) 40 mg EC tablet, Take 1 tablet (40 mg) by mouth once daily., Disp: 90 tablet, Rfl: 1    pregabalin (Lyrica) 200 mg capsule, Take 1 capsule (200 mg) by mouth 3 times a day., Disp: , Rfl:     rosuvastatin (Crestor) 10 mg tablet, Take 1 tablet (10 mg) by  mouth once daily., Disp: 90 tablet, Rfl: 1    tirzepatide (Mounjaro) 15 mg/0.5 mL pen injector, Inject 15 mg under the skin 1 (one) time per week. Inject 15 mg subcutaneously every week, Disp: 2 mL, Rfl: 1    DULoxetine (Cymbalta) 30 mg DR capsule, Take 1 capsule (30 mg) by mouth once daily. To be taken in combination with duloxetine 60 mg daily for a total of 90 mg daily, Disp: 30 capsule, Rfl: 5    DULoxetine (Cymbalta) 60 mg DR capsule, Take 1 capsule (60 mg) by mouth once daily. To be taken in combination with duloxetine 30 mg daily for total dose of 90 mg daily, Disp: 30 capsule, Rfl: 5    prucalopride (Motegrity) 2 mg tablet, Take 1 tablet (2 mg) by mouth once daily. (Patient not taking: Reported on 12/13/2023), Disp: 30 tablet, Rfl: 2     Past Medical History:   Diagnosis Date    Depression, unspecified     Depressive disorder    Medial meniscus tear 04/24/2023    Personal history of other diseases of the digestive system 01/12/2016    History of esophageal reflux    Personal history of other diseases of the respiratory system 08/13/2018    History of acute sinusitis    Personal history of other endocrine, nutritional and metabolic disease     History of hypothyroidism    Personal history of other specified conditions     History of insomnia    Prediabetes     Prediabetes    Tear of meniscus of right knee 04/24/2023        Past Surgical History:   Procedure Laterality Date    APPENDECTOMY  01/12/2016    Appendectomy    CHOLECYSTECTOMY  01/12/2016    Cholecystectomy    HERNIA REPAIR  01/12/2016    Inguinal Hernia Repair    HYSTERECTOMY  11/07/2017    Hysterectomy    MR HEAD ANGIO WO IV CONTRAST  2/6/2019    MR HEAD ANGIO WO IV CONTRAST 2/6/2019 [facility]    MR NECK ANGIO WO IV CONTRAST  2/6/2019    MR NECK ANGIO WO IV CONTRAST 2/6/2019 [facility]    OTHER SURGICAL HISTORY  08/12/2020    Knee arthroscopy    SINUS SURGERY  01/12/2016    Sinus Surgery    SPLENECTOMY, TOTAL  01/12/2016     "Splenectomy    TOTAL ABDOMINAL HYSTERECTOMY W/ BILATERAL SALPINGOOPHORECTOMY  01/12/2016    Total Abdominal Hysterectomy With Removal Of Both Ovaries        Family History   Problem Relation Name Age of Onset    Arthritis Mother      Heart disease Mother      Other (CVA) Mother      Coronary artery disease Mother      Diabetes Mother      Osteoporosis Mother      Alcohol abuse Father      Hypertension Father      Lung cancer Father      Diabetes Maternal Grandmother      Liver cancer Maternal Grandmother      Throat cancer Maternal Grandfather      Osteoarthritis Other          Grandmother        Allergies   Allergen Reactions    Bactrim [Sulfamethoxazole-Trimethoprim] GI Upset    Cephalexin GI Upset     GASTROINTESTIAL IRRITATION    Ciprofloxacin GI Upset     Reaction from Community: Other(See Desc)    Darvocet A500 [Propoxyphene N-Acetaminophen] Itching    Nsaids (Non-Steroidal Anti-Inflammatory Drug) Other     Hx of ulcerative colitis    Vicodin [Hydrocodone-Acetaminophen] Itching        Objective     Visit Vitals  /74   Pulse 78   Resp 18   Ht 1.727 m (5' 8\")   Wt 92.9 kg (204 lb 14.4 oz)   BMI 31.15 kg/m²   OB Status Postmenopausal   Smoking Status Every Day   BSA 2.11 m²        Physical Exam    PE:  General: Well-developed, well-nourished, no acute distress. The patient demonstrates no pain behavior, symptom magnification or overt drug-seeking behavior.  Eye: Pupils appropriate for room lighting  Neck/thyroid: No obvious goiter or enlargement of neck noted  Respiratory exam: Normal respiratory effort, unlabored respiration. No accessory muscle use noted  Cardiac exam: Bilateral radial pulses intact  Abdominal: Nondistended  Spine, lumbar: The patient is able to rise from a seated to standing position without hesitancy, push off, or delay. Gait is grossly nonantalgic. Tenderness to paraspinous musculature is noted lower thoracic and throughout lumbar spine.  Myofascial tender points thoracic and lumbar " region.  Minimal tenderness over bilateral SI joints.  Negative provocative maneuvers.  Neurologic exam: Muscle strength is antigravity in all 4 extremities.  Equal strength bilateral lower extremities 5/5.  Normal sensation bilateral lower extremities.  Psychiatric exam: Judgment and insight normal, affect normal, speech is fluent, affect appropriate, demonstrating no signs of hypersomnolence, sedation, or confusion          Assessment/Plan   Problem List Items Addressed This Visit             ICD-10-CM    Fibromyalgia - Primary M79.7     58-year-old female with history of low back pain, sacroiliitis and fibromyalgia who has done very well with bilateral SI injections with long-lasting relief.  Patient presents at today's office visit with symptoms and exam most consistent with fibromyalgia type pain.  She continues to receive excellent relief from most recent bilateral SI injections on 8//23.  Patient continues endorsing greater than 95% relief of SI pain.  Patient states that she has been able to be more active and more functional after her injection.  She continues to experience MSK/myofascial pain with more standing and bending and lifting activity.  Managing her pain with pregabalin 200 mg 3 times daily, duloxetine 90 mg daily and the addition of Skelaxin for muscle tightness and spasm.  She will supplement with Tylenol and lidocaine patches as needed.  She has been unable to get her next wave electrotherapy unit.  We will contact the company for any additional insurance requirements for this unit.  Patient encouraged to start a formal course of physical therapy if she notes an increase in pain related to sacroiliitis or fibromyalgia.  Advised that formal course of physical therapy could benefit both fibromyalgia and sacroiliitis.  Patient would like to continue home therapy exercises at this time.  Plan discussed with patient at today's office visit.    -We will continue pregabalin 200 mg 3 times daily as  needed.  Patient feels that this medication has been very beneficial and continues to receive excellent relief without adverse effects.  -We will continue duloxetine 90 mg daily.  Per patient her psychiatrist is in agreement with this dose and would like us to take over prescribing this medication.  -We will investigate approval for next wave electrotherapy unit.  -Patient may continue to supplement with Tylenol lidocaine patches as needed.  -Encourage patient to do home exercises consistently to treat fibromyalgia and sacroiliac pain.  Patient has a referral for formal course of physical therapy and will start therapy if she feels that her pain is not well-controlled with home therapy exercises.    Given the patient's report of fibromyalgia pain  I discussed with her in detail the recommendations most current with the [employer] in the treatment of fibromyalgia.  This includes most recent research showing that the most effective treatment for fibromyalgia is physical exercise.  Cognitive behavioral therapy has also been shown to have positive effects towards pain and other symptoms related to fibromyalgia.  Adjuvant treatments including acupuncture chiropractic and massage therapy have also been beneficial.  Specifically related to medication the FDA has approved few medications directly for the treatment of fibromyalgia and these include Cymbalta and Savella as well as older medications including Elavil.  Medications outside of the serotonin and norepinephrine realms include muscle relaxants as well as Lyrica and gabapentin.  Most importantly the College's recommendation is to avoid opiate narcotic medication for treating fibromyalgia the evidence shows at these drugs are not helpful to most people with fibromyalgia and in fact will cause greater pain sensitivity or make pain persistent    -Patient will follow-up in 6 months or sooner if needed.         Relevant Medications    DULoxetine  (Cymbalta) 30 mg DR capsule    DULoxetine (Cymbalta) 60 mg DR capsule    Arthralgia M25.50

## 2023-12-13 NOTE — ASSESSMENT & PLAN NOTE
58-year-old female with history of low back pain, sacroiliitis and fibromyalgia who has done very well with bilateral SI injections with long-lasting relief.  Patient presents at today's office visit with symptoms and exam most consistent with fibromyalgia type pain.  She continues to receive excellent relief from most recent bilateral SI injections on 8//23.  Patient continues endorsing greater than 95% relief of SI pain.  Patient states that she has been able to be more active and more functional after her injection.  She continues to experience MSK/myofascial pain with more standing and bending and lifting activity.  Managing her pain with pregabalin 200 mg 3 times daily, duloxetine 90 mg daily and the addition of Skelaxin for muscle tightness and spasm.  She will supplement with Tylenol and lidocaine patches as needed.  She has been unable to get her next wave electrotherapy unit.  We will contact the company for any additional insurance requirements for this unit.  Patient encouraged to start a formal course of physical therapy if she notes an increase in pain related to sacroiliitis or fibromyalgia.  Advised that formal course of physical therapy could benefit both fibromyalgia and sacroiliitis.  Patient would like to continue home therapy exercises at this time.  Plan discussed with patient at today's office visit.    -We will continue pregabalin 200 mg 3 times daily as needed.  Patient feels that this medication has been very beneficial and continues to receive excellent relief without adverse effects.  -We will continue duloxetine 90 mg daily.  Per patient her psychiatrist is in agreement with this dose and would like us to take over prescribing this medication.  -We will investigate approval for next wave electrotherapy unit.  -Patient may continue to supplement with Tylenol lidocaine patches as needed.  -Encourage patient to do home exercises consistently to treat fibromyalgia and sacroiliac pain.   Patient has a referral for formal course of physical therapy and will start therapy if she feels that her pain is not well-controlled with home therapy exercises.    Given the patient's report of fibromyalgia pain  I discussed with her in detail the recommendations most current with the [employer] in the treatment of fibromyalgia.  This includes most recent research showing that the most effective treatment for fibromyalgia is physical exercise.  Cognitive behavioral therapy has also been shown to have positive effects towards pain and other symptoms related to fibromyalgia.  Adjuvant treatments including acupuncture chiropractic and massage therapy have also been beneficial.  Specifically related to medication the FDA has approved few medications directly for the treatment of fibromyalgia and these include Cymbalta and Savella as well as older medications including Elavil.  Medications outside of the serotonin and norepinephrine realms include muscle relaxants as well as Lyrica and gabapentin.  Most importantly the College's recommendation is to avoid opiate narcotic medication for treating fibromyalgia the evidence shows at these drugs are not helpful to most people with fibromyalgia and in fact will cause greater pain sensitivity or make pain persistent    -Patient will follow-up in 6 months or sooner if needed.

## 2023-12-15 ENCOUNTER — OFFICE VISIT (OUTPATIENT)
Dept: PRIMARY CARE | Facility: CLINIC | Age: 58
End: 2023-12-15
Payer: COMMERCIAL

## 2023-12-15 VITALS
HEART RATE: 85 BPM | SYSTOLIC BLOOD PRESSURE: 116 MMHG | BODY MASS INDEX: 31.14 KG/M2 | DIASTOLIC BLOOD PRESSURE: 78 MMHG | WEIGHT: 204.8 LBS

## 2023-12-15 DIAGNOSIS — K21.9 GASTROESOPHAGEAL REFLUX DISEASE WITHOUT ESOPHAGITIS: ICD-10-CM

## 2023-12-15 DIAGNOSIS — E11.9 TYPE 2 DIABETES MELLITUS WITHOUT COMPLICATION, WITHOUT LONG-TERM CURRENT USE OF INSULIN (MULTI): Primary | ICD-10-CM

## 2023-12-15 DIAGNOSIS — Z23 NEED FOR TDAP VACCINATION: ICD-10-CM

## 2023-12-15 DIAGNOSIS — E53.8 B12 DEFICIENCY: ICD-10-CM

## 2023-12-15 DIAGNOSIS — E78.2 MIXED HYPERLIPIDEMIA: ICD-10-CM

## 2023-12-15 DIAGNOSIS — F17.210 CIGARETTE NICOTINE DEPENDENCE WITHOUT COMPLICATION: ICD-10-CM

## 2023-12-15 DIAGNOSIS — K59.04 CHRONIC IDIOPATHIC CONSTIPATION: ICD-10-CM

## 2023-12-15 DIAGNOSIS — E03.9 HYPOTHYROIDISM, UNSPECIFIED TYPE: ICD-10-CM

## 2023-12-15 DIAGNOSIS — J30.9 ALLERGIC RHINITIS, UNSPECIFIED SEASONALITY, UNSPECIFIED TRIGGER: ICD-10-CM

## 2023-12-15 DIAGNOSIS — K51.919 ULCERATIVE COLITIS WITH COMPLICATION, UNSPECIFIED LOCATION (MULTI): ICD-10-CM

## 2023-12-15 DIAGNOSIS — Z23 NEED FOR PNEUMOCOCCAL VACCINE: ICD-10-CM

## 2023-12-15 DIAGNOSIS — Z12.31 ENCOUNTER FOR SCREENING MAMMOGRAM FOR MALIGNANT NEOPLASM OF BREAST: ICD-10-CM

## 2023-12-15 PROCEDURE — 90677 PCV20 VACCINE IM: CPT | Performed by: STUDENT IN AN ORGANIZED HEALTH CARE EDUCATION/TRAINING PROGRAM

## 2023-12-15 PROCEDURE — 90715 TDAP VACCINE 7 YRS/> IM: CPT | Performed by: STUDENT IN AN ORGANIZED HEALTH CARE EDUCATION/TRAINING PROGRAM

## 2023-12-15 PROCEDURE — 90471 IMMUNIZATION ADMIN: CPT | Performed by: STUDENT IN AN ORGANIZED HEALTH CARE EDUCATION/TRAINING PROGRAM

## 2023-12-15 PROCEDURE — 99215 OFFICE O/P EST HI 40 MIN: CPT | Performed by: STUDENT IN AN ORGANIZED HEALTH CARE EDUCATION/TRAINING PROGRAM

## 2023-12-15 PROCEDURE — 3078F DIAST BP <80 MM HG: CPT | Performed by: STUDENT IN AN ORGANIZED HEALTH CARE EDUCATION/TRAINING PROGRAM

## 2023-12-15 PROCEDURE — 3048F LDL-C <100 MG/DL: CPT | Performed by: STUDENT IN AN ORGANIZED HEALTH CARE EDUCATION/TRAINING PROGRAM

## 2023-12-15 PROCEDURE — 3044F HG A1C LEVEL LT 7.0%: CPT | Performed by: STUDENT IN AN ORGANIZED HEALTH CARE EDUCATION/TRAINING PROGRAM

## 2023-12-15 PROCEDURE — 90472 IMMUNIZATION ADMIN EACH ADD: CPT | Performed by: STUDENT IN AN ORGANIZED HEALTH CARE EDUCATION/TRAINING PROGRAM

## 2023-12-15 PROCEDURE — 3074F SYST BP LT 130 MM HG: CPT | Performed by: STUDENT IN AN ORGANIZED HEALTH CARE EDUCATION/TRAINING PROGRAM

## 2023-12-15 RX ORDER — ROSUVASTATIN CALCIUM 10 MG/1
10 TABLET, COATED ORAL DAILY
Qty: 90 TABLET | Refills: 1 | Status: SHIPPED | OUTPATIENT
Start: 2023-12-15 | End: 2024-06-05 | Stop reason: SDUPTHER

## 2023-12-15 RX ORDER — PANTOPRAZOLE SODIUM 40 MG/1
40 TABLET, DELAYED RELEASE ORAL DAILY
Qty: 90 TABLET | Refills: 1 | Status: SHIPPED | OUTPATIENT
Start: 2023-12-15 | End: 2024-06-05 | Stop reason: SDUPTHER

## 2023-12-15 RX ORDER — POLYETHYLENE GLYCOL 3350, SODIUM SULFATE, SODIUM CHLORIDE, POTASSIUM CHLORIDE, SODIUM ASCORBATE, AND ASCORBIC ACID 7.5-2.691G
KIT ORAL
COMMUNITY
Start: 2023-10-26 | End: 2024-02-15 | Stop reason: WASHOUT

## 2023-12-15 RX ORDER — TIRZEPATIDE 15 MG/.5ML
15 INJECTION, SOLUTION SUBCUTANEOUS
Qty: 6 ML | Refills: 1 | Status: SHIPPED | OUTPATIENT
Start: 2023-12-15 | End: 2024-04-22 | Stop reason: SDUPTHER

## 2023-12-15 RX ORDER — CYANOCOBALAMIN 1000 UG/ML
1000 INJECTION, SOLUTION INTRAMUSCULAR; SUBCUTANEOUS
Qty: 1 ML | Refills: 11 | Status: SHIPPED | OUTPATIENT
Start: 2023-12-15 | End: 2024-06-05 | Stop reason: SDUPTHER

## 2023-12-15 RX ORDER — BUPROPION HYDROCHLORIDE 150 MG/1
150 TABLET ORAL
COMMUNITY
Start: 2023-10-17

## 2023-12-15 RX ORDER — LISDEXAMFETAMINE DIMESYLATE 20 MG/1
20 CAPSULE ORAL DAILY
COMMUNITY
Start: 2023-11-27

## 2023-12-15 RX ORDER — LEVOTHYROXINE SODIUM 100 UG/1
100 TABLET ORAL DAILY
Qty: 90 TABLET | Refills: 1 | Status: SHIPPED | OUTPATIENT
Start: 2023-12-15 | End: 2024-05-24 | Stop reason: SDUPTHER

## 2023-12-15 RX ORDER — METAXALONE 800 MG/1
TABLET ORAL
COMMUNITY
Start: 2023-11-12 | End: 2023-12-21 | Stop reason: SDUPTHER

## 2023-12-15 RX ORDER — FLUTICASONE PROPIONATE 50 MCG
1 SPRAY, SUSPENSION (ML) NASAL 2 TIMES DAILY
Qty: 18.2 G | Refills: 3 | Status: SHIPPED | OUTPATIENT
Start: 2023-12-15 | End: 2024-06-05 | Stop reason: SDUPTHER

## 2023-12-15 RX ORDER — AZELASTINE 1 MG/ML
1 SPRAY, METERED NASAL 2 TIMES DAILY
Qty: 30 ML | Refills: 3 | Status: SHIPPED | OUTPATIENT
Start: 2023-12-15 | End: 2024-06-05 | Stop reason: SDUPTHER

## 2023-12-15 RX ORDER — IBUPROFEN 200 MG
1 TABLET ORAL EVERY 24 HOURS
Qty: 30 PATCH | Refills: 2 | Status: SHIPPED | OUTPATIENT
Start: 2023-12-15

## 2023-12-15 ASSESSMENT — PATIENT HEALTH QUESTIONNAIRE - PHQ9
1. LITTLE INTEREST OR PLEASURE IN DOING THINGS: NOT AT ALL
2. FEELING DOWN, DEPRESSED OR HOPELESS: NOT AT ALL
SUM OF ALL RESPONSES TO PHQ9 QUESTIONS 1 AND 2: 0

## 2023-12-15 NOTE — PROGRESS NOTES
Subjective   Patient ID: Paola Manley is a 58 y.o. female who presents for Diabetes, Allergic Rhinitis, Hyperlipidemia, Hypothyroidism, and GERD.  Today she is accompanied by alone.     HPI  1. Type 2 Diabetes  She was recently started on Mounjaro in November 2022 due to weight loss clinic.  Her last hemoglobin A1c was performed in January and showed a value of 5.4%  Not currently on an ACE inhibitor or ARB.  She is tolerating her medication.  Currently down 50+ pounds  Denies polydipsia or dry mouth.  Feels well and now requesting myself to take over her Mounjaro    2. Allergic rhinitis  She continues to use Flonase and Astelin as needed.  Requesting refills of both her Astelin and fluticasone  Denies sinus pressure, fever, chills, sore throat, chest pain, or shortness of breath.    3.  Constipation/ulcerative colitis  She is taking Linzess 290 mcg as needed.  Also noted that she is following up with Dr. Holman   She states that this helps when she notices that she is getting more constipated.  Requesting refills.    4. Hyperlipidemia  Currently taking rosuvastatin 10 mg daily.  She is tolerating the medication, denies myalgias.  Last cholesterol was great and stable with an LDL was at 80  Requesting refills    5. Hypothyroidism  Currently taking levothyroxine 88 mcg  Her TSH was noted to be elevated  Willing and wishing to increase to 100 mcg  Stated that she has not been taking this on an empty stomach and wishes to discuss this further    6.  GERD  Currently takes pantoprazole 40 mg daily.  States that this medication continues to help with her symptoms, and knows when she forgets to take her medication that her symptoms returns.  Last endoscopy was in 2016  Again, currently following up with gastroenterology    7. Vitamin B12 deficiency  In the past she has been receiving vitamin B12 injections.  However, since the beginning of COVID-19 pandemic, she did not want to come to the office monthly and had stopped  getting her vitamin B12 shots.  B12 once again is low at 164  She continues to do B12 injections at home  Requesting refills    8.  Nicotine dependence  Continues to smoke a few cigarettes daily  Asking if she can get nicotine patches sent to her pharmacy    9.  Need for Tdap and PCV 20  Willing and wishing to update her Tdap and PCV 20 due to her history  Also waiting on the birth of her second grandchild    10.  Healthcare maintenance  Willing and wishing to get her mammogram done before the end of the year  Requesting a requisition      Current Outpatient Medications on File Prior to Visit   Medication Sig Dispense Refill    buPROPion XL (Wellbutrin XL) 150 mg 24 hr tablet Take 1 tablet (150 mg) by mouth once daily in the morning. Take before meals.      metaxalone (Skelaxin) 800 mg tablet TAKE ONE-HALF (1/2) TABLET TWO (2) TIMES A DAY AS NEEDED FOR MUSCLE SPASM      peg-sod sul-NaCl-KCl-asb-C (MoviPrep) 100-7.5-2.691 gram solution use as directed      Vyvanse 20 mg capsule Take 1 capsule (20 mg) by mouth once daily. Take in the morning      azelastine (Astelin) 137 mcg (0.1 %) nasal spray Administer 1 spray into each nostril 2 times a day. 30 mL 3    cloNIDine (Catapres) 0.2 mg tablet TAKE ONE (1) TABLET AT BEDTIME      cyanocobalamin (Vitamin B-12) 1,000 mcg/mL injection Inject 1 mL (1,000 mcg) into the shoulder, thigh, or buttocks every 30 (thirty) days. 1 mL 11    doxycycline (Adoxa) 150 mg tablet Take 1 tablet (150 mg) by mouth once daily.      DULoxetine (Cymbalta) 30 mg DR capsule Take 1 capsule (30 mg) by mouth once daily. To be taken in combination with duloxetine 60 mg daily for a total of 90 mg daily 30 capsule 5    DULoxetine (Cymbalta) 60 mg DR capsule Take 1 capsule (60 mg) by mouth once daily. To be taken in combination with duloxetine 30 mg daily for total dose of 90 mg daily 30 capsule 5    fluticasone (Flonase) 50 mcg/actuation nasal spray Administer 1 spray into each nostril 2 times a day. 18.2  g 3    levothyroxine (Synthroid, Levoxyl) 88 mcg tablet Take 1 tablet (88 mcg) by mouth once daily. 90 tablet 0    linaCLOtide (Linzess) 290 mcg capsule Take 1 capsule (290 mcg) by mouth once daily in the morning. Take before meals. Do not crush or chew.      melatonin 10 mg capsule TAKE 1 CAPSULE Bedtime PRN      pantoprazole (ProtoNix) 40 mg EC tablet Take 1 tablet (40 mg) by mouth once daily. 90 tablet 1    pregabalin (Lyrica) 200 mg capsule Take 1 capsule (200 mg) by mouth 3 times a day.      rosuvastatin (Crestor) 10 mg tablet Take 1 tablet (10 mg) by mouth once daily. 90 tablet 1    tirzepatide (Mounjaro) 15 mg/0.5 mL pen injector Inject 15 mg under the skin 1 (one) time per week. Inject 15 mg subcutaneously every week 2 mL 1    [DISCONTINUED] DULoxetine (Cymbalta) 30 mg DR capsule Take 1 capsule (30 mg) by mouth once daily.      [DISCONTINUED] DULoxetine (Cymbalta) 60 mg DR capsule Take 1 capsule (60 mg) by mouth once daily.      [DISCONTINUED] prucalopride (Motegrity) 2 mg tablet Take 1 tablet (2 mg) by mouth once daily. (Patient not taking: Reported on 12/13/2023) 30 tablet 2     No current facility-administered medications on file prior to visit.        Allergies   Allergen Reactions    Bactrim [Sulfamethoxazole-Trimethoprim] GI Upset    Cephalexin GI Upset     GASTROINTESTIAL IRRITATION    Ciprofloxacin GI Upset     Reaction from Community: Other(See Desc)    Darvocet A500 [Propoxyphene N-Acetaminophen] Itching    Nsaids (Non-Steroidal Anti-Inflammatory Drug) Other     Hx of ulcerative colitis    Vicodin [Hydrocodone-Acetaminophen] Itching       Immunization History   Administered Date(s) Administered    Flu vaccine (IIV4), preservative free *Check age/dose* 10/11/2017, 12/20/2018, 09/28/2020, 11/24/2021    Hib (PRP-D) 12/21/2012    Influenza, seasonal, injectable 01/07/2015, 10/06/2015    Influenza, seasonal, injectable, preservative free 11/12/2013    Meningococcal MCV4, Unspecified 12/21/2012     Moderna SARS-CoV-2 Vaccination 04/08/2021, 05/06/2021, 01/17/2022    Pneumococcal Conjugate PCV 7 12/21/2012    Tdap vaccine, age 7 year and older (BOOSTRIX) 11/11/2013    Zoster vaccine, recombinant, adult (SHINGRIX) 01/17/2022, 01/24/2023         Review of Systems  All pertinent positive symptoms are included in the history of present illness.  All other systems have been reviewed and are negative and noncontributory to this patient's current ailments.     Objective   /78 (BP Location: Left arm, Patient Position: Sitting, BP Cuff Size: Adult)   Pulse 85   Wt 92.9 kg (204 lb 12.8 oz)   BMI 31.14 kg/m²   BSA: 2.11 meters squared  Lab on 12/13/2023   Component Date Value Ref Range Status    Thyroid Stimulating Hormone 12/13/2023 9.97 (H)  0.44 - 3.98 mIU/L Final    Cholesterol 12/13/2023 157  0 - 199 mg/dL Final          Age      Desirable   Borderline High   High     0-19 Y     0 - 169       170 - 199     >/= 200    20-24 Y     0 - 189       190 - 224     >/= 225         >24 Y     0 - 199       200 - 239     >/= 240   **All ranges are based on fasting samples. Specific   therapeutic targets will vary based on patient-specific   cardiac risk.    Pediatric guidelines reference:Pediatrics 2011, 128(S5).Adult guidelines reference: NCEP ATPIII Guidelines,[place] 2001, 258:2486-97    Venipuncture immediately after or during the administration of Metamizole may lead to falsely low results. Testing should be performed immediately prior to Metamizole dosing.    HDL-Cholesterol 12/13/2023 62.6  mg/dL Final      Age       Very Low   Low     Normal    High    0-19 Y    < 35      < 40     40-45     ----  20-24 Y    ----     < 40      >45      ----        >24 Y      ----     < 40     40-60      >60      Cholesterol/HDL Ratio 12/13/2023 2.5   Final      Ref Values  Desirable  < 3.4  High Risk  > 5.0    LDL Calculated 12/13/2023 80  <=99 mg/dL Final                                Near   Borderline      AGE      Desirable   Optimal    High     High     Very High     0-19 Y     0 - 109     ---    110-129   >/= 130     ----    20-24 Y     0 - 119     ---    120-159   >/= 160     ----      >24 Y     0 -  99   100-129  130-159   160-189     >/=190      VLDL 12/13/2023 14  0 - 40 mg/dL Final    Triglycerides 12/13/2023 70  0 - 149 mg/dL Final       Age         Desirable   Borderline High   High     Very High   0 D-90 D    19 - 174         ----         ----        ----  91 D- 9 Y     0 -  74        75 -  99     >/= 100      ----    10-19 Y     0 -  89        90 - 129     >/= 130      ----    20-24 Y     0 - 114       115 - 149     >/= 150      ----         >24 Y     0 - 149       150 - 199    200- 499    >/= 500    Venipuncture immediately after or during the administration of Metamizole may lead to falsely low results. Testing should be performed immediately prior to Metamizole dosing.    Non HDL Cholesterol 12/13/2023 94  0 - 149 mg/dL Final          Age       Desirable   Borderline High   High     Very High     0-19 Y     0 - 119       120 - 144     >/= 145    >/= 160    20-24 Y     0 - 149       150 - 189     >/= 190      ----         >24 Y    30 mg/dL above LDL Cholesterol goal      Glucose 12/13/2023 84  74 - 99 mg/dL Final    Sodium 12/13/2023 142  136 - 145 mmol/L Final    Potassium 12/13/2023 4.5  3.5 - 5.3 mmol/L Final    Chloride 12/13/2023 107  98 - 107 mmol/L Final    Bicarbonate 12/13/2023 28  21 - 32 mmol/L Final    Anion Gap 12/13/2023 12  10 - 20 mmol/L Final    Urea Nitrogen 12/13/2023 18  6 - 23 mg/dL Final    Creatinine 12/13/2023 0.95  0.50 - 1.05 mg/dL Final    eGFR 12/13/2023 70  >60 mL/min/1.73m*2 Final    Calculations of estimated GFR are performed using the 2021 CKD-EPI Study Refit equation without the race variable for the IDMS-Traceable creatinine methods.  https://jasn.asnjournals.org/content/early/2021/09/22/ASN.0181721919    Calcium 12/13/2023 9.8  8.6 - 10.3 mg/dL Final    Albumin 12/13/2023 4.3  3.4 - 5.0  g/dL Final    Alkaline Phosphatase 12/13/2023 81  33 - 110 U/L Final    Total Protein 12/13/2023 7.0  6.4 - 8.2 g/dL Final    AST 12/13/2023 16  9 - 39 U/L Final    Bilirubin, Total 12/13/2023 0.4  0.0 - 1.2 mg/dL Final    ALT 12/13/2023 13  7 - 45 U/L Final    Patients treated with Sulfasalazine may generate falsely decreased results for ALT.    Hemoglobin A1C 12/13/2023 5.4  see below % Final    Estimated Average Glucose 12/13/2023 108  Not Established mg/dL Final    Vitamin B12 12/13/2023 164 (L)  211 - 911 pg/mL Final    Vitamin D, 25-Hydroxy, Total 12/13/2023 61  30 - 100 ng/mL Final    Thyroxine, Free 12/13/2023 0.46 (L)  0.61 - 1.12 ng/dL Final       Physical Exam  CONSTITUTIONAL - well nourished, well developed, looks like stated age, in no acute distress, not ill-appearing, and not tired appearing  SKIN - normal skin color and pigmentation, normal skin turgor without rash, lesions, or nodules visualized  HEAD - no trauma, normocephalic  EYES - normal external exam  CHEST -no distressed breathing, good effort  EXTREMITIES - no edema, no deformities  NEUROLOGICAL - normal balance, normal motor, no ataxia  PSYCHIATRIC - alert, pleasant and cordial, age-appropriate    Assessment/Plan   1. Type 2 Diabetes  Continue Mounjaro   A1c one of the best on file  Continue the great work    2. Allergic rhinitis  happy to hear that your allergies appear to be well controlled with Flonase and Astelin.  I will send in other prescriptions at this time    3.  Constipation  Refills provided for Linzess 290 mcg daily as needed.  Otherwise, please follow-up with your gastroenterologist for continued care/recommendations    4. Hyperlipidemia  Cholesterol looks good  Continue medications without any changes, rosuvastatin 10 mg    5. Hypothyroidism  Due to your TSH being elevated which was surprising  I did increase levothyroxine to 100 mcg daily  We will have this repeated within the next 6-8 weeks    6. Esophageal reflux  Stable,  continue to take pantoprazole 40 mg daily.    7. Vitamin B12 deficiency  Continue B12 injections  This was sent to your local pharmacy    8.  Nicotine dependence  We had some counseling on nicotine dependence  I did order some nicotine patches  Please let me know when you would like to slowly decrease down from the 14    9.  Need for Tdap and PCV 20  PCV 20 and Tdap were both provided today    10.  Healthcare maintenance  Please get your mammogram done before the end of the year  Otherwise, please follow-up for your formal physical examination within the next 6 months

## 2023-12-20 DIAGNOSIS — M62.838 MUSCLE SPASM: Primary | ICD-10-CM

## 2023-12-20 RX ORDER — METAXALONE 800 MG/1
800 TABLET ORAL DAILY PRN
Qty: 30 TABLET | Refills: 5 | Status: CANCELLED | OUTPATIENT
Start: 2023-12-20 | End: 2024-06-17

## 2023-12-20 NOTE — TELEPHONE ENCOUNTER
Patient calling to [facility] pharmacy never received a script for metaxalone (Skelaxin) . Please send script

## 2023-12-20 NOTE — TELEPHONE ENCOUNTER
Patient requested refill for Skelaxin 800mg. She states she takes 1 tablet at bedtime. [facility].

## 2023-12-21 RX ORDER — METAXALONE 800 MG/1
TABLET ORAL
Qty: 30 TABLET | Refills: 5 | Status: SHIPPED | OUTPATIENT
Start: 2023-12-21 | End: 2024-06-06 | Stop reason: SDUPTHER

## 2024-01-10 ENCOUNTER — APPOINTMENT (OUTPATIENT)
Dept: RADIOLOGY | Facility: HOSPITAL | Age: 59
End: 2024-01-10
Payer: COMMERCIAL

## 2024-01-17 ENCOUNTER — HOSPITAL ENCOUNTER (OUTPATIENT)
Dept: RADIOLOGY | Facility: HOSPITAL | Age: 59
Discharge: HOME | End: 2024-01-17
Payer: COMMERCIAL

## 2024-01-17 DIAGNOSIS — Z12.31 ENCOUNTER FOR SCREENING MAMMOGRAM FOR MALIGNANT NEOPLASM OF BREAST: ICD-10-CM

## 2024-01-17 PROCEDURE — 77067 SCR MAMMO BI INCL CAD: CPT

## 2024-01-17 PROCEDURE — 77067 SCR MAMMO BI INCL CAD: CPT | Performed by: RADIOLOGY

## 2024-01-17 PROCEDURE — 77063 BREAST TOMOSYNTHESIS BI: CPT | Performed by: RADIOLOGY

## 2024-01-17 NOTE — RESULT ENCOUNTER NOTE
CT cardiac score mammogram shows BI-RADS Category 2-benign    There are mild asymmetries but none are suspicious    Will continue screening yearly

## 2024-02-01 DIAGNOSIS — M46.1 SACROILIITIS (CMS-HCC): ICD-10-CM

## 2024-02-01 DIAGNOSIS — M79.7 FIBROMYALGIA: Primary | ICD-10-CM

## 2024-02-02 RX ORDER — PREGABALIN 200 MG/1
200 CAPSULE ORAL 3 TIMES DAILY
Qty: 90 CAPSULE | Refills: 4 | Status: SHIPPED | OUTPATIENT
Start: 2024-02-02 | End: 2024-06-06 | Stop reason: SDUPTHER

## 2024-02-07 ENCOUNTER — OFFICE VISIT (OUTPATIENT)
Dept: ORTHOPEDIC SURGERY | Facility: CLINIC | Age: 59
End: 2024-02-07
Payer: COMMERCIAL

## 2024-02-07 ENCOUNTER — HOSPITAL ENCOUNTER (OUTPATIENT)
Dept: RADIOLOGY | Facility: HOSPITAL | Age: 59
Discharge: HOME | End: 2024-02-07
Payer: COMMERCIAL

## 2024-02-07 VITALS — WEIGHT: 204 LBS | BODY MASS INDEX: 30.92 KG/M2 | HEIGHT: 68 IN

## 2024-02-07 DIAGNOSIS — M17.12 ARTHRITIS OF LEFT KNEE: ICD-10-CM

## 2024-02-07 DIAGNOSIS — M25.562 LEFT KNEE PAIN, UNSPECIFIED CHRONICITY: ICD-10-CM

## 2024-02-07 DIAGNOSIS — M25.562 LEFT KNEE PAIN, UNSPECIFIED CHRONICITY: Primary | ICD-10-CM

## 2024-02-07 PROCEDURE — 20610 DRAIN/INJ JOINT/BURSA W/O US: CPT | Performed by: PHYSICIAN ASSISTANT

## 2024-02-07 PROCEDURE — 99214 OFFICE O/P EST MOD 30 MIN: CPT | Performed by: PHYSICIAN ASSISTANT

## 2024-02-07 PROCEDURE — 73564 X-RAY EXAM KNEE 4 OR MORE: CPT | Mod: LT

## 2024-02-07 PROCEDURE — 73564 X-RAY EXAM KNEE 4 OR MORE: CPT | Mod: LEFT SIDE | Performed by: RADIOLOGY

## 2024-02-07 RX ORDER — LIDOCAINE HYDROCHLORIDE 5 MG/ML
4 INJECTION, SOLUTION INFILTRATION; PERINEURAL
Status: COMPLETED | OUTPATIENT
Start: 2024-02-07 | End: 2024-02-07

## 2024-02-07 RX ORDER — DICLOFENAC SODIUM 10 MG/G
4 GEL TOPICAL 4 TIMES DAILY PRN
Qty: 100 G | Refills: 2 | Status: SHIPPED | OUTPATIENT
Start: 2024-02-07

## 2024-02-07 RX ORDER — TRIAMCINOLONE ACETONIDE 40 MG/ML
40 INJECTION, SUSPENSION INTRA-ARTICULAR; INTRAMUSCULAR
Status: COMPLETED | OUTPATIENT
Start: 2024-02-07 | End: 2024-02-07

## 2024-02-07 RX ADMIN — TRIAMCINOLONE ACETONIDE 40 MG: 40 INJECTION, SUSPENSION INTRA-ARTICULAR; INTRAMUSCULAR at 15:24

## 2024-02-07 RX ADMIN — LIDOCAINE HYDROCHLORIDE 4 ML: 5 INJECTION, SOLUTION INFILTRATION; PERINEURAL at 15:24

## 2024-02-07 NOTE — PROGRESS NOTES
History of Present Illness  59 y.o. female presenting with exacerbation of left knee pain.  Patient states about 3 weeks ago she slipped and fell directly on the left knee when it was snowing outside.  Patient states since then the knee has been increasingly more sore.  She has also had some pain over the lateral aspect of the leg into the shin.  She has been taking Tylenol with no significant improvement of her pain.    Past Medical History:   Diagnosis Date    Depression, unspecified     Depressive disorder    Medial meniscus tear 04/24/2023    Personal history of other diseases of the digestive system 01/12/2016    History of esophageal reflux    Personal history of other diseases of the respiratory system 08/13/2018    History of acute sinusitis    Personal history of other endocrine, nutritional and metabolic disease     History of hypothyroidism    Personal history of other specified conditions     History of insomnia    Prediabetes     Prediabetes    Tear of meniscus of right knee 04/24/2023       Medication Documentation Review Audit       Reviewed by Tarsha Whitfield MA (Medical Assistant) on 02/07/24 at 1443      Medication Order Taking? Sig Documenting Provider Last Dose Status   azelastine (Astelin) 137 mcg (0.1 %) nasal spray [id number]  Administer 1 spray into each nostril 2 times a day. Ante T Pletikosic, DO  Active   buPROPion XL (Wellbutrin XL) 150 mg 24 hr tablet [id number]  Take 1 tablet (150 mg) by mouth once daily in the morning. Take before meals. Historical Provider, MD  Active   cloNIDine (Catapres) 0.2 mg tablet [id number] No TAKE ONE (1) TABLET AT BEDTIME Historical Provider, MD Taking Active   cyanocobalamin (Vitamin B-12) 1,000 mcg/mL injection [id number]  Inject 1 mL (1,000 mcg) into the muscle every 30 (thirty) days. Ante T Pletikosic, DO  Active   doxycycline (Adoxa) 150 mg tablet [id number] No Take 1 tablet (150 mg) by mouth once daily. Historical Provider, MD Taking Active   DULoxetine  (Cymbalta) 30 mg DR capsule [id number]  Take 1 capsule (30 mg) by mouth once daily. To be taken in combination with duloxetine 60 mg daily for a total of 90 mg daily Margarita ColeIRENE, APRN-CNS   24 235   DULoxetine (Cymbalta) 60 mg DR capsule [id number]  Take 1 capsule (60 mg) by mouth once daily. To be taken in combination with duloxetine 30 mg daily for total dose of 90 mg daily Margarita ColeIRENE, APRN-CNS   24   fluticasone (Flonase) 50 mcg/actuation nasal spray [id number]  Administer 1 spray into each nostril 2 times a day. Addy CUEVAS Pletikosic, DO  Active   levothyroxine (Synthroid, Levoxyl) 100 mcg tablet [id number]  Take 1 tablet (100 mcg) by mouth once daily. Addy CUEVAS Pletikosic, DO  Active   linaCLOtide (Linzess) 290 mcg capsule [id number]  Take 1 capsule (290 mcg) by mouth once daily in the morning. Take before meals. Do not crush or chew. Ante MASON Pletikosic, DO  Active   melatonin 10 mg capsule [id number] No TAKE 1 CAPSULE Bedtime PRN Historical Provider, MD Taking Active   metaxalone (Skelaxin) 800 mg tablet [id number]  TAKE ONE-HALF (1/2) TABLET TWO (2) TIMES A DAY AS NEEDED FOR MUSCLE SPASM Margarita CROW IRENE Cole, APRN-CNS  Active   nicotine (Nicoderm CQ) 14 mg/24 hr patch [id number]  Place 1 patch over 24 hours on the skin once every 24 hours. Ante MASON Pletikosic, DO  Active   pantoprazole (ProtoNix) 40 mg EC tablet [id number]  Take 1 tablet (40 mg) by mouth once daily. Ante MASON Pletikosic, DO  Active   peg-sod sul-NaCl-KCl-asb-C (MoviPrep) 100-7.5-2.691 gram solution [id number]  use as directed Historical Provider, MD  Active   Discontinued 24 0941   pregabalin (Lyrica) 200 mg capsule [id number]  Take 1 capsule (200 mg) by mouth 3 times a day. Margarita Cole, APRN-CNP, APRN-CNS  Active   rosuvastatin (Crestor) 10 mg tablet [id number]  Take 1 tablet (10 mg) by mouth once daily. Ante T Pletikosic, DO  Active   tirzepatide (Mounjaro) 15 mg/0.5 mL pen injector [id number]   Inject 15 mg under the skin 1 (one) time per week. Inject 15 mg subcutaneously every week Ante T Pletikosic, DO  Active   Vyvanse 20 mg capsule [id number]  Take 1 capsule (20 mg) by mouth once daily. Take in the morning Historical Provider, MD  Active                    Allergies   Allergen Reactions    Bactrim [Sulfamethoxazole-Trimethoprim] GI Upset    Cephalexin GI Upset     GASTROINTESTIAL IRRITATION    Ciprofloxacin GI Upset     Reaction from Community: Other(See Desc)    Darvocet A500 [Propoxyphene N-Acetaminophen] Itching    Nsaids (Non-Steroidal Anti-Inflammatory Drug) Other     Hx of ulcerative colitis    Vicodin [Hydrocodone-Acetaminophen] Itching       Social History     Socioeconomic History    Marital status:      Spouse name: Not on file    Number of children: Not on file    Years of education: Not on file    Highest education level: Not on file   Occupational History    Not on file   Tobacco Use    Smoking status: Every Day     Types: Cigarettes    Smokeless tobacco: Never   Substance and Sexual Activity    Alcohol use: Not on file    Drug use: Not on file    Sexual activity: Not on file   Other Topics Concern    Not on file   Social History Narrative    Not on file     Social Determinants of Health     Financial Resource Strain: Not on file   Food Insecurity: Not on file   Transportation Needs: Not on file   Physical Activity: Not on file   Stress: Not on file   Social Connections: Not on file   Intimate Partner Violence: Not on file   Housing Stability: Not on file       Past Surgical History:   Procedure Laterality Date    APPENDECTOMY  01/12/2016    Appendectomy    CHOLECYSTECTOMY  01/12/2016    Cholecystectomy    HERNIA REPAIR  01/12/2016    Inguinal Hernia Repair    HYSTERECTOMY  11/07/2017    Hysterectomy    MR HEAD ANGIO WO IV CONTRAST  2/6/2019    MR HEAD ANGIO WO IV CONTRAST 2/6/2019 [facility]    MR NECK ANGIO WO IV CONTRAST  2/6/2019    MR NECK ANGIO WO IV CONTRAST 2/6/2019  [facility]    OTHER SURGICAL HISTORY  08/12/2020    Knee arthroscopy    SINUS SURGERY  01/12/2016    Sinus Surgery    SPLENECTOMY, TOTAL  01/12/2016    Splenectomy    TOTAL ABDOMINAL HYSTERECTOMY W/ BILATERAL SALPINGOOPHORECTOMY  01/12/2016    Total Abdominal Hysterectomy With Removal Of Both Ovaries         Review of Systems    GENERAL: Negative  GI: Negative  MUSCULOSKELETAL: See HPI  SKIN: Negative  NEURO:  Negative     Physical Exam  Constitutional  General appearance:  Alert, oriented, and in no acute distress.  Well developed, well nourished.  Head and Face  Head and face:  Normocephalic and atraumatic.  Ears, Nose, Mouth, and Throat  External inspection of ears and nose: Normal.  Eyes:  Pupils are equal and round.  Neck  Neck:  no neck mass was observed.  Pulmonary  Respiratory effort:  no respiratory distress.  Cardiovascular  Intact distal pulses.  Lymphatic  Palpation of lymph nodes in the affected extremity:  Normal.  Skin  Skin and subcutaneous tissue:  Normal skin color and pigmentation.  Normal skin turgor.  No rashes.  Neurologic  Reflexes:  deep tendon reflexes were 2+ and symmetric.  Sensation:  normal to light touch.  Psychiatric  Judgement and insight:  Intact.  Mood and affect:  Normal.  Musculoskeletal  Left knee full range of motion.  Tenderness palpation of the medial and lateral joint line.  Mild tenderness palpation of the peroneal musculature.  Extensor mechanism intact.  Patient has a negative Lockman exam, negative anterior and negative posterior drawer. The knee is stable to varus and valgus stress without pain. Negative patellar apprehension. Patient is neurovascularly intact in the bilateral lower extremities.       Imaging     Xrays were ordered and obtained by myself, Genevieve LOU. I personally reviewed the images today and the following is my personal findings: X-rays of the left knee show severe arthritis no acute findings    Patient ID: Paola Manley is a 59 y.o.  female.    L Inj/Asp: L knee on 2/7/2024 3:24 PM  Indications: pain  Details: 22 G needle, anterolateral approach  Medications: 40 mg triamcinolone acetonide 40 mg/mL; 4 mL lidocaine 5 mg/mL (0.5 %)  Outcome: tolerated well, no immediate complications  Procedure, treatment alternatives, risks and benefits explained, specific risks discussed. Consent was given by the patient. Immediately prior to procedure a time out was called to verify the correct patient, procedure, equipment, support staff and site/side marked as required. Patient was prepped and draped in the usual sterile fashion.             Assessment  Left knee severe arthritis, peroneal muscle strain    Plan  Had a long discussion with the patient regarding her left knee pain.  Discussed with patient the fall likely exacerbated the pain she was experiencing from her severe arthritis.  She recently had a steroid injection about 2 and half months ago.  I discussed with her the possibility of another injection today which she wanted to proceed with and tolerated well.  Discussed that I would not make a habit of doing injections any sooner than every 3 months.  We will also give the patient a prescription for Voltaren gel to use as needed for pain or discomfort of the left knee.  If her knee pain should return or worsen I would recommend following up with Dr. Carlin for further evaluation recommendation.  The patient should call the office during business hours (9am-3pm; Monday - Friday)  with any questions or problems.  If the patient has any urgent issues outside of business hours, they should go to a local Emergency Room.

## 2024-02-15 ENCOUNTER — TELEMEDICINE (OUTPATIENT)
Dept: PRIMARY CARE | Facility: CLINIC | Age: 59
End: 2024-02-15
Payer: COMMERCIAL

## 2024-02-15 DIAGNOSIS — J01.90 ACUTE NON-RECURRENT SINUSITIS, UNSPECIFIED LOCATION: ICD-10-CM

## 2024-02-15 DIAGNOSIS — R09.81 SINUS CONGESTION: ICD-10-CM

## 2024-02-15 DIAGNOSIS — E11.9 TYPE 2 DIABETES MELLITUS WITHOUT COMPLICATION, WITHOUT LONG-TERM CURRENT USE OF INSULIN (MULTI): ICD-10-CM

## 2024-02-15 DIAGNOSIS — J06.9 ACUTE URI: Primary | ICD-10-CM

## 2024-02-15 PROCEDURE — 99214 OFFICE O/P EST MOD 30 MIN: CPT | Performed by: STUDENT IN AN ORGANIZED HEALTH CARE EDUCATION/TRAINING PROGRAM

## 2024-02-15 RX ORDER — PROPRANOLOL HYDROCHLORIDE 10 MG/1
10 TABLET ORAL 3 TIMES DAILY PRN
COMMUNITY
Start: 2024-02-11

## 2024-02-15 RX ORDER — AMOXICILLIN AND CLAVULANATE POTASSIUM 875; 125 MG/1; MG/1
875 TABLET, FILM COATED ORAL 2 TIMES DAILY
Qty: 20 TABLET | Refills: 0 | Status: SHIPPED | OUTPATIENT
Start: 2024-02-15 | End: 2024-02-25

## 2024-02-15 RX ORDER — FLASH GLUCOSE SENSOR
KIT MISCELLANEOUS
Qty: 1 EACH | Refills: 0 | Status: SHIPPED | OUTPATIENT
Start: 2024-02-15

## 2024-02-15 NOTE — PROGRESS NOTES
Subjective   Patient ID: Paola Manley is a 59 y.o. female who presents for URI.    HPI   Patient is calling into the office today via a telemedicine virtual visit to discuss signs/symptoms which she believes is a sinusitis/upper respiratory infection    Stated that she has been sick for the past 8+ days    Has been noticing nasal congestion as well as bilateral ear congestion    Initially she started to have body aches, minor fever, hot/cold intolerances, but ultimately tested negative for COVID-19 x 3    Did not test herself for influenza or any other viruses    States that she cannot shake her signs/symptoms at this time  Denies any difficulty breathing or any other acute signs/symptoms    Asking for further eval/recommendations    Lastly, in regards to her diabetes, she feels sometimes that she is lightheaded  Currently taking Mounjaro 15 mg daily as indicated the chart  Now asking if there is a way that she is can take her sugars at home    Review of Systems  All pertinent positive symptoms are included in the history of present illness.    All other systems have been reviewed and are negative and noncontributory to this patient's current ailments.    Objective   There were no vitals taken for this visit.    Physical Exam  CONSTITUTIONAL - well nourished, well developed, looks like stated age, in no acute distress, appears slightly fatigued and ill  SKIN - normal skin color and pigmentation, normal skin turgor without rash, lesions, or nodules visualized  HEAD - no trauma, normocephalic  EYES - normal external exam  CHEST -no distressed breathing, good effort  EXTREMITIES - no edema, no deformities  NEUROLOGICAL - normal balance, normal motor, no ataxia  PSYCHIATRIC - alert, pleasant and cordial, age-appropriate    Assessment/Plan   We had a long conversation about your signs/symptoms I truly believe this could be bacterial in nature  You had a similar scenario back in January 2023    At that time we provided  Augmentin which is what we will provide today  875 mg was sent to be taken twice daily for the next 10 days    Risks, benefits, and options of treatment(s) were discussed after reviewing all current medication(s) and drug allergy(ies)  I opted for the treatment that we discussed with instructions on the medication use for your underlying medical ailment(s)  I encouraged supportive care such as rest, fluids and Advil/Tylenol as warranted  Return to the clinic in 7-10 days or sooner if symptoms worsen or persist as we will then further evaluate    At any point if you have acute or worsening signs/symptoms please notify me immediately and/or go to the nearest emergency room to be acutely evaluated    Lastly, I did send over blood glucose testing kit to your pharmacy  If this is not covered please contact the office so we can send something else in its place

## 2024-03-20 ENCOUNTER — OFFICE VISIT (OUTPATIENT)
Dept: ORTHOPEDIC SURGERY | Facility: CLINIC | Age: 59
End: 2024-03-20
Payer: COMMERCIAL

## 2024-03-20 DIAGNOSIS — M17.0 PRIMARY OSTEOARTHRITIS OF BOTH KNEES: Primary | ICD-10-CM

## 2024-03-20 PROCEDURE — 20610 DRAIN/INJ JOINT/BURSA W/O US: CPT | Performed by: ORTHOPAEDIC SURGERY

## 2024-03-20 PROCEDURE — 99214 OFFICE O/P EST MOD 30 MIN: CPT | Performed by: ORTHOPAEDIC SURGERY

## 2024-03-20 RX ORDER — TRIAMCINOLONE ACETONIDE 40 MG/ML
1 INJECTION, SUSPENSION INTRA-ARTICULAR; INTRAMUSCULAR
Status: COMPLETED | OUTPATIENT
Start: 2024-03-20 | End: 2024-03-20

## 2024-03-20 RX ADMIN — TRIAMCINOLONE ACETONIDE 1 ML: 40 INJECTION, SUSPENSION INTRA-ARTICULAR; INTRAMUSCULAR at 09:38

## 2024-03-20 NOTE — LETTER
March 20, 2024     Addy Storm DO  [address]  [facility]  [facility] [address]    Patient: Paola Manley   YOB: 1965   Date of Visit: 3/20/2024       Dear Dr. Addy Storm DO:    Thank you for referring Paola Manley to me for evaluation. Below are my notes for this consultation.  If you have questions, please do not hesitate to call me. I look forward to following your patient along with you.       Sincerely,     Josue Carlin MD      CC: No Recipients  ______________________________________________________________________________________    This is a consultation from Dr. Addy Storm DO for   Chief Complaint   Patient presents with   • Left Knee - Pain   Right knee pain    This is a 59 y.o. female who presents for follow-up for her bilateral knees.  I saw the patient many years ago for her right knee, eventually she had right knee arthroscopy.  Right knee was doing quite well for a long time but over the last several months she has had exacerbation of her pain and return of her symptoms.  Sharp pain over the medial and anterior knee worse with walking proving with rest.  She also recently had a fall which exacerbated her left knee pain.  She had a cortisone injection about 6 weeks ago which was very helpful.  No surgeries on the left side.  Occasionally takes anti-inflammatories.    Physical Exam    There has been no interval change in this patient's past medical, surgical, medications, allergies, family history or social history since the most recent visit to a provider within our department. 14 point review of systems was performed, reviewed, and negative except for pertinent positives documented in the history of present illness.     Constitutional: well developed, well nourished female in no acute distress  Psychiatric: normal mood, appropriate affect  Eyes: sclera anicteric  HENT: normocephalic/atraumatic  CV: regular rate and rhythm    Respiratory: non labored breathing  Integumentary: no rash  Neurological: moves all extremities    Bilateral knee exam: skin intact no lacerations or abrations.  1+ effusion.  Tender medial joint line. negative log roll negative patellar grind. ROM 0-120. stable to varus and valgus stress at 0 and 30 degrees. negative lachman negative posterior drawer negative esha. 5/5 ehl/fhl/gs/ta. silt s/s/sp/dp/t. 2+ dp/pt        Xrays were ordered by me, they were reviewed and independently interpreted by me today, they show severe degenerative disease bone-on-bone in the left side, right side shows moderate degenerative changes on x-ray couple years old.    L Inj/Asp: R knee on 3/20/2024 9:38 AM  Indications: pain and joint swelling  Details: 22 G needle, anterolateral approach  Medications: 1 mL triamcinolone acetonide 40 mg/mL    Discussion:  I discussed the conservative treatment options for knee osteoarthritis including but not limited to physical therapy, oral NSAIDS, activity and lifestyle modification, and corticosteroid injections. Pt has elected to undergo a cortisone injection today. I have explained the risk and benefits of an injection including the possibility of joint infection, bleeding, damage to cartilage, allergic reaction. Patient verbalized understanding and gave verbal consent wishes to proceed with a intra-articular cortisone injection for their knee.    Procedure:  After discussing the risk and benefits of the procedure, we proceeded with an intra-articular right knee injection. We discussed the risks and benefits and potential morbidity related to the treatment, and to the prescription medication administered in the injection    With the patient's informed verbal consent, the right knee was prepped in standard sterile fashion with Chlorhexidine. The skin was then anesthetized with ethyl chloride spray and cleaned again with Chlorhexidine. The knee was then apirated/injected with a prefilled  20-gauge syringe of 40 mg Kenalog + 4 ml Lidocaine using the lateral approach without complications.  The patient tolerated this well and felt immediate initial relief of symptoms. A bandaid was applied and the patient ambulated out of the clinic on ther own accord without difficulty. Patient was instructed to avoid physical activity for 24-48 hours to prevent the knees from swelling and may ice the knees as tolerated. Patient should contact the office if any signs of of infection appear: redness, fever, chills, drainage, swelling or warmth to the knees.  Pt understands that the injections can be repeated no sooner than 3 months.    Procedure, treatment alternatives, risks and benefits explained, specific risks discussed. Consent was given by the patient. Immediately prior to procedure a time out was called to verify the correct patient, procedure, equipment, support staff and site/side marked as required. Patient was prepped and draped in the usual sterile fashion.             Impression/Plan: This is a 59 y.o. female with bilateral knee arthritis.  I had an in depth discussion with the patient regarding treatment options for arthritis and their relative risks and benefits. We reviewed surgical and nonsurgical option for treatment. Treatments include anti inflammatory medications, physical therapy, weight loss, activity modification, use of assistive devices, injection therapies. We discussed current prescriptions and risks and benefits of continuation of prescription medication as apporpriate. We discussed that arthritis is often progressive over time, an in end stage arthritis surgical interventions can be considered, including arthroplasty. All questions were answered and the patient voiced their understanding.  Left side doing quite well after cortisone injection, discussed possibility of arthroplasty in the future.  Will inject the right today and plan on regular x-rays soon when she is ready for.  I will see  "her back.    BMI Readings from Last 1 Encounters:   02/07/24 31.02 kg/m²      Lab Results   Component Value Date    CREATININE 0.95 12/13/2023     Tobacco Use: High Risk (3/20/2024)    Patient History    • Smoking Tobacco Use: Every Day    • Smokeless Tobacco Use: Never    • Passive Exposure: Not on file      Computed MELD 3.0 unavailable. Necessary lab results were not found in the last year.  Computed MELD-Na unavailable. Necessary lab results were not found in the last year.       Lab Results   Component Value Date    HGBA1C 5.4 12/13/2023     No results found for: \"STAPHMRSASCR\"  "

## 2024-03-20 NOTE — PROGRESS NOTES
This is a consultation from Dr. Addy Storm DO for   Chief Complaint   Patient presents with    Left Knee - Pain   Right knee pain    This is a 59 y.o. female who presents for follow-up for her bilateral knees.  I saw the patient many years ago for her right knee, eventually she had right knee arthroscopy.  Right knee was doing quite well for a long time but over the last several months she has had exacerbation of her pain and return of her symptoms.  Sharp pain over the medial and anterior knee worse with walking proving with rest.  She also recently had a fall which exacerbated her left knee pain.  She had a cortisone injection about 6 weeks ago which was very helpful.  No surgeries on the left side.  Occasionally takes anti-inflammatories.    Physical Exam    There has been no interval change in this patient's past medical, surgical, medications, allergies, family history or social history since the most recent visit to a provider within our department. 14 point review of systems was performed, reviewed, and negative except for pertinent positives documented in the history of present illness.     Constitutional: well developed, well nourished female in no acute distress  Psychiatric: normal mood, appropriate affect  Eyes: sclera anicteric  HENT: normocephalic/atraumatic  CV: regular rate and rhythm   Respiratory: non labored breathing  Integumentary: no rash  Neurological: moves all extremities    Bilateral knee exam: skin intact no lacerations or abrations.  1+ effusion.  Tender medial joint line. negative log roll negative patellar grind. ROM 0-120. stable to varus and valgus stress at 0 and 30 degrees. negative lachman negative posterior drawer negative esha. 5/5 ehl/fhl/gs/ta. silt s/s/sp/dp/t. 2+ dp/pt        Xrays were ordered by me, they were reviewed and independently interpreted by me today, they show severe degenerative disease bone-on-bone in the left side, right side shows moderate  degenerative changes on x-ray couple years old.    L Inj/Asp: R knee on 3/20/2024 9:38 AM  Indications: pain and joint swelling  Details: 22 G needle, anterolateral approach  Medications: 1 mL triamcinolone acetonide 40 mg/mL    Discussion:  I discussed the conservative treatment options for knee osteoarthritis including but not limited to physical therapy, oral NSAIDS, activity and lifestyle modification, and corticosteroid injections. Pt has elected to undergo a cortisone injection today. I have explained the risk and benefits of an injection including the possibility of joint infection, bleeding, damage to cartilage, allergic reaction. Patient verbalized understanding and gave verbal consent wishes to proceed with a intra-articular cortisone injection for their knee.    Procedure:  After discussing the risk and benefits of the procedure, we proceeded with an intra-articular right knee injection. We discussed the risks and benefits and potential morbidity related to the treatment, and to the prescription medication administered in the injection    With the patient's informed verbal consent, the right knee was prepped in standard sterile fashion with Chlorhexidine. The skin was then anesthetized with ethyl chloride spray and cleaned again with Chlorhexidine. The knee was then apirated/injected with a prefilled 20-gauge syringe of 40 mg Kenalog + 4 ml Lidocaine using the lateral approach without complications.  The patient tolerated this well and felt immediate initial relief of symptoms. A bandaid was applied and the patient ambulated out of the clinic on ther own accord without difficulty. Patient was instructed to avoid physical activity for 24-48 hours to prevent the knees from swelling and may ice the knees as tolerated. Patient should contact the office if any signs of of infection appear: redness, fever, chills, drainage, swelling or warmth to the knees.  Pt understands that the injections can be repeated no  "sooner than 3 months.    Procedure, treatment alternatives, risks and benefits explained, specific risks discussed. Consent was given by the patient. Immediately prior to procedure a time out was called to verify the correct patient, procedure, equipment, support staff and site/side marked as required. Patient was prepped and draped in the usual sterile fashion.             Impression/Plan: This is a 59 y.o. female with bilateral knee arthritis.  I had an in depth discussion with the patient regarding treatment options for arthritis and their relative risks and benefits. We reviewed surgical and nonsurgical option for treatment. Treatments include anti inflammatory medications, physical therapy, weight loss, activity modification, use of assistive devices, injection therapies. We discussed current prescriptions and risks and benefits of continuation of prescription medication as apporpriate. We discussed that arthritis is often progressive over time, an in end stage arthritis surgical interventions can be considered, including arthroplasty. All questions were answered and the patient voiced their understanding.  Left side doing quite well after cortisone injection, discussed possibility of arthroplasty in the future.  Will inject the right today and plan on regular x-rays soon when she is ready for.  I will see her back.    BMI Readings from Last 1 Encounters:   02/07/24 31.02 kg/m²      Lab Results   Component Value Date    CREATININE 0.95 12/13/2023     Tobacco Use: High Risk (3/20/2024)    Patient History     Smoking Tobacco Use: Every Day     Smokeless Tobacco Use: Never     Passive Exposure: Not on file      Computed MELD 3.0 unavailable. Necessary lab results were not found in the last year.  Computed MELD-Na unavailable. Necessary lab results were not found in the last year.       Lab Results   Component Value Date    HGBA1C 5.4 12/13/2023     No results found for: \"STAPHMRSASCR\"  "

## 2024-04-10 DIAGNOSIS — E11.9 TYPE 2 DIABETES MELLITUS WITHOUT COMPLICATION, WITHOUT LONG-TERM CURRENT USE OF INSULIN (MULTI): ICD-10-CM

## 2024-04-22 DIAGNOSIS — E11.9 TYPE 2 DIABETES MELLITUS WITHOUT COMPLICATION, WITHOUT LONG-TERM CURRENT USE OF INSULIN (MULTI): ICD-10-CM

## 2024-04-22 PROCEDURE — RXMED WILLOW AMBULATORY MEDICATION CHARGE

## 2024-04-22 RX ORDER — TIRZEPATIDE 15 MG/.5ML
15 INJECTION, SOLUTION SUBCUTANEOUS
Qty: 6 ML | Refills: 1 | Status: SHIPPED | OUTPATIENT
Start: 2024-04-22 | End: 2024-06-05 | Stop reason: SDUPTHER

## 2024-04-23 ENCOUNTER — PHARMACY VISIT (OUTPATIENT)
Dept: PHARMACY | Facility: CLINIC | Age: 59
End: 2024-04-23
Payer: COMMERCIAL

## 2024-05-16 ENCOUNTER — PHARMACY VISIT (OUTPATIENT)
Dept: PHARMACY | Facility: CLINIC | Age: 59
End: 2024-05-16
Payer: COMMERCIAL

## 2024-05-16 PROCEDURE — RXMED WILLOW AMBULATORY MEDICATION CHARGE

## 2024-05-23 ENCOUNTER — LAB (OUTPATIENT)
Dept: LAB | Facility: LAB | Age: 59
End: 2024-05-23
Payer: COMMERCIAL

## 2024-05-23 DIAGNOSIS — E03.9 HYPOTHYROIDISM, UNSPECIFIED TYPE: ICD-10-CM

## 2024-05-23 LAB
T4 FREE SERPL-MCNC: 1.09 NG/DL (ref 0.61–1.12)
TSH SERPL-ACNC: 0.15 MIU/L (ref 0.44–3.98)

## 2024-05-23 PROCEDURE — 36415 COLL VENOUS BLD VENIPUNCTURE: CPT

## 2024-05-23 PROCEDURE — 84443 ASSAY THYROID STIM HORMONE: CPT

## 2024-05-23 PROCEDURE — 84439 ASSAY OF FREE THYROXINE: CPT

## 2024-05-24 DIAGNOSIS — E03.9 HYPOTHYROIDISM, UNSPECIFIED TYPE: ICD-10-CM

## 2024-05-24 RX ORDER — LEVOTHYROXINE SODIUM 88 UG/1
88 TABLET ORAL DAILY
Qty: 90 TABLET | Refills: 0 | Status: SHIPPED | OUTPATIENT
Start: 2024-05-24 | End: 2024-06-05 | Stop reason: SDUPTHER

## 2024-05-24 NOTE — RESULT ENCOUNTER NOTE
Thyroid-stimulating hormone now low at 0.15 which notes that there is too much supplementation being taken  I understand she is on 100 mcg daily and just prior to that she was at 88 mcg daily    The patient has a choice, we can easily decrease her back down to 88 mcg but I would almost recommend that we take 188 mcg on an every other day fashion    If the patient would like to start this we can easily provide refills at this time or we can easily discuss this at her next appointment which is scheduled early next month    Please advise

## 2024-05-31 ENCOUNTER — TELEPHONE (OUTPATIENT)
Dept: ORTHOPEDIC SURGERY | Facility: CLINIC | Age: 59
End: 2024-05-31
Payer: COMMERCIAL

## 2024-05-31 ENCOUNTER — PATIENT MESSAGE (OUTPATIENT)
Dept: PAIN MEDICINE | Facility: HOSPITAL | Age: 59
End: 2024-05-31
Payer: COMMERCIAL

## 2024-05-31 NOTE — TELEPHONE ENCOUNTER
2/7/24 bilat knee pain  Patient contacted [facility] and they stated Durolane does not require PA Spoke with Toya HOOPER Ref#[id number]. Should patient decide to proceed if we have durolane gel injection in office on 6/2024 we can give them.

## 2024-06-03 NOTE — TELEPHONE ENCOUNTER
From: Paola Manley  To: Margarita Cole  Sent: 5/31/2024 9:35 AM EDT  Subject: Extreme Pain Flare up    Good morning,  I have an appointment with you on 6/6/24 but I am currently having severe pain on my left side. I have never had this before. I have a bone on bone knee issue and take shots for that. However, my next shot is not until 6/21/24. About 2 weeks ago my knee started swelling and I could hardly walk w/o unbearable pain. Then last week I noticed the pain was burning down my shin to ankle and my thigh was also burning. I thought maybe sciatica. I think my knee issue has caused my sciatica and or sacroiliac issues to flare. 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 I cannot lay on my left side because of the pain. Basically I am not sleeping and just trying to walk. I have been doing my PT stretches, popping Tylenol Arthritis like it is candy, icing knee. Basically everything I can think of. Is there any way for me to get a pain medication to get me through to our visit next week. I have ulcerative colitis so cannot take NSAIDS, Ibuprofen,   Advil etc. I always have a problem getting anything for pain because basically due to my conditions I can only take hydrocodone for pain relief. I just know I need something to help this pain. Please help me, I don't want to end up in ER.

## 2024-06-05 ENCOUNTER — OFFICE VISIT (OUTPATIENT)
Dept: PRIMARY CARE | Facility: CLINIC | Age: 59
End: 2024-06-05
Payer: COMMERCIAL

## 2024-06-05 ENCOUNTER — LAB (OUTPATIENT)
Dept: LAB | Facility: LAB | Age: 59
End: 2024-06-05
Payer: COMMERCIAL

## 2024-06-05 VITALS
HEART RATE: 88 BPM | DIASTOLIC BLOOD PRESSURE: 74 MMHG | OXYGEN SATURATION: 99 % | HEIGHT: 68 IN | WEIGHT: 217 LBS | SYSTOLIC BLOOD PRESSURE: 120 MMHG | BODY MASS INDEX: 32.89 KG/M2

## 2024-06-05 DIAGNOSIS — K51.919 ULCERATIVE COLITIS WITH COMPLICATION, UNSPECIFIED LOCATION (MULTI): ICD-10-CM

## 2024-06-05 DIAGNOSIS — Z00.00 HEALTHCARE MAINTENANCE: ICD-10-CM

## 2024-06-05 DIAGNOSIS — E53.8 B12 DEFICIENCY: ICD-10-CM

## 2024-06-05 DIAGNOSIS — F17.210 CIGARETTE NICOTINE DEPENDENCE WITHOUT COMPLICATION: ICD-10-CM

## 2024-06-05 DIAGNOSIS — E78.2 MIXED HYPERLIPIDEMIA: ICD-10-CM

## 2024-06-05 DIAGNOSIS — E55.9 VITAMIN D DEFICIENCY: ICD-10-CM

## 2024-06-05 DIAGNOSIS — E11.9 TYPE 2 DIABETES MELLITUS WITHOUT COMPLICATION, WITHOUT LONG-TERM CURRENT USE OF INSULIN (MULTI): ICD-10-CM

## 2024-06-05 DIAGNOSIS — K21.9 GASTROESOPHAGEAL REFLUX DISEASE WITHOUT ESOPHAGITIS: ICD-10-CM

## 2024-06-05 DIAGNOSIS — E03.9 HYPOTHYROIDISM, UNSPECIFIED TYPE: ICD-10-CM

## 2024-06-05 DIAGNOSIS — Z00.00 HEALTHCARE MAINTENANCE: Primary | ICD-10-CM

## 2024-06-05 DIAGNOSIS — K59.04 CHRONIC IDIOPATHIC CONSTIPATION: ICD-10-CM

## 2024-06-05 DIAGNOSIS — J30.9 ALLERGIC RHINITIS, UNSPECIFIED SEASONALITY, UNSPECIFIED TRIGGER: ICD-10-CM

## 2024-06-05 LAB
25(OH)D3 SERPL-MCNC: 73 NG/ML (ref 30–100)
ALBUMIN SERPL BCP-MCNC: 4.4 G/DL (ref 3.4–5)
ALP SERPL-CCNC: 82 U/L (ref 33–110)
ALT SERPL W P-5'-P-CCNC: 17 U/L (ref 7–45)
ANION GAP SERPL CALC-SCNC: 12 MMOL/L (ref 10–20)
AST SERPL W P-5'-P-CCNC: 20 U/L (ref 9–39)
BASOPHILS # BLD AUTO: 0.06 X10*3/UL (ref 0–0.1)
BASOPHILS NFR BLD AUTO: 0.8 %
BILIRUB SERPL-MCNC: 0.5 MG/DL (ref 0–1.2)
BUN SERPL-MCNC: 17 MG/DL (ref 6–23)
CALCIUM SERPL-MCNC: 10 MG/DL (ref 8.6–10.3)
CHLORIDE SERPL-SCNC: 108 MMOL/L (ref 98–107)
CHOLEST SERPL-MCNC: 158 MG/DL (ref 0–199)
CHOLESTEROL/HDL RATIO: 2.6
CO2 SERPL-SCNC: 27 MMOL/L (ref 21–32)
CREAT SERPL-MCNC: 0.82 MG/DL (ref 0.5–1.05)
CREAT UR-MCNC: 96.1 MG/DL (ref 20–320)
EGFRCR SERPLBLD CKD-EPI 2021: 83 ML/MIN/1.73M*2
EOSINOPHIL # BLD AUTO: 0.51 X10*3/UL (ref 0–0.7)
EOSINOPHIL NFR BLD AUTO: 6.6 %
ERYTHROCYTE [DISTWIDTH] IN BLOOD BY AUTOMATED COUNT: 14.6 % (ref 11.5–14.5)
GLUCOSE SERPL-MCNC: 87 MG/DL (ref 74–99)
HCT VFR BLD AUTO: 42.3 % (ref 36–46)
HDLC SERPL-MCNC: 61.2 MG/DL
HGB BLD-MCNC: 13.7 G/DL (ref 12–16)
IMM GRANULOCYTES # BLD AUTO: 0.02 X10*3/UL (ref 0–0.7)
IMM GRANULOCYTES NFR BLD AUTO: 0.3 % (ref 0–0.9)
LDLC SERPL CALC-MCNC: 82 MG/DL
LYMPHOCYTES # BLD AUTO: 2.59 X10*3/UL (ref 1.2–4.8)
LYMPHOCYTES NFR BLD AUTO: 33.6 %
MCH RBC QN AUTO: 32.1 PG (ref 26–34)
MCHC RBC AUTO-ENTMCNC: 32.4 G/DL (ref 32–36)
MCV RBC AUTO: 99 FL (ref 80–100)
MICROALBUMIN UR-MCNC: 16.1 MG/L
MICROALBUMIN/CREAT UR: 16.8 UG/MG CREAT
MONOCYTES # BLD AUTO: 0.87 X10*3/UL (ref 0.1–1)
MONOCYTES NFR BLD AUTO: 11.3 %
NEUTROPHILS # BLD AUTO: 3.66 X10*3/UL (ref 1.2–7.7)
NEUTROPHILS NFR BLD AUTO: 47.4 %
NON HDL CHOLESTEROL: 97 MG/DL (ref 0–149)
NRBC BLD-RTO: 0 /100 WBCS (ref 0–0)
PLATELET # BLD AUTO: 258 X10*3/UL (ref 150–450)
POTASSIUM SERPL-SCNC: 4.7 MMOL/L (ref 3.5–5.3)
PROT SERPL-MCNC: 7.8 G/DL (ref 6.4–8.2)
RBC # BLD AUTO: 4.27 X10*6/UL (ref 4–5.2)
SODIUM SERPL-SCNC: 142 MMOL/L (ref 136–145)
TRIGL SERPL-MCNC: 73 MG/DL (ref 0–149)
VIT B12 SERPL-MCNC: 184 PG/ML (ref 211–911)
VLDL: 15 MG/DL (ref 0–40)
WBC # BLD AUTO: 7.7 X10*3/UL (ref 4.4–11.3)

## 2024-06-05 PROCEDURE — 82607 VITAMIN B-12: CPT

## 2024-06-05 PROCEDURE — 36415 COLL VENOUS BLD VENIPUNCTURE: CPT

## 2024-06-05 PROCEDURE — 80061 LIPID PANEL: CPT

## 2024-06-05 PROCEDURE — 99396 PREV VISIT EST AGE 40-64: CPT | Performed by: STUDENT IN AN ORGANIZED HEALTH CARE EDUCATION/TRAINING PROGRAM

## 2024-06-05 PROCEDURE — 82043 UR ALBUMIN QUANTITATIVE: CPT

## 2024-06-05 PROCEDURE — 82570 ASSAY OF URINE CREATININE: CPT

## 2024-06-05 PROCEDURE — 82306 VITAMIN D 25 HYDROXY: CPT

## 2024-06-05 PROCEDURE — 80053 COMPREHEN METABOLIC PANEL: CPT

## 2024-06-05 PROCEDURE — 3074F SYST BP LT 130 MM HG: CPT | Performed by: STUDENT IN AN ORGANIZED HEALTH CARE EDUCATION/TRAINING PROGRAM

## 2024-06-05 PROCEDURE — 3078F DIAST BP <80 MM HG: CPT | Performed by: STUDENT IN AN ORGANIZED HEALTH CARE EDUCATION/TRAINING PROGRAM

## 2024-06-05 PROCEDURE — 99214 OFFICE O/P EST MOD 30 MIN: CPT | Performed by: STUDENT IN AN ORGANIZED HEALTH CARE EDUCATION/TRAINING PROGRAM

## 2024-06-05 PROCEDURE — 93000 ELECTROCARDIOGRAM COMPLETE: CPT | Performed by: STUDENT IN AN ORGANIZED HEALTH CARE EDUCATION/TRAINING PROGRAM

## 2024-06-05 PROCEDURE — 83036 HEMOGLOBIN GLYCOSYLATED A1C: CPT

## 2024-06-05 PROCEDURE — 85025 COMPLETE CBC W/AUTO DIFF WBC: CPT

## 2024-06-05 RX ORDER — LEVOTHYROXINE SODIUM 88 UG/1
TABLET ORAL
Qty: 90 TABLET | Refills: 0 | Status: SHIPPED | OUTPATIENT
Start: 2024-06-05

## 2024-06-05 RX ORDER — FLUTICASONE PROPIONATE 50 MCG
1 SPRAY, SUSPENSION (ML) NASAL 2 TIMES DAILY
Qty: 18.2 G | Refills: 3 | Status: SHIPPED | OUTPATIENT
Start: 2024-06-05

## 2024-06-05 RX ORDER — TIRZEPATIDE 15 MG/.5ML
15 INJECTION, SOLUTION SUBCUTANEOUS
Qty: 6 ML | Refills: 1 | Status: SHIPPED | OUTPATIENT
Start: 2024-06-09

## 2024-06-05 RX ORDER — CYANOCOBALAMIN 1000 UG/ML
1000 INJECTION, SOLUTION INTRAMUSCULAR; SUBCUTANEOUS
Qty: 1 ML | Refills: 11 | Status: SHIPPED | OUTPATIENT
Start: 2024-06-05

## 2024-06-05 RX ORDER — AZELASTINE 1 MG/ML
1 SPRAY, METERED NASAL 2 TIMES DAILY
Qty: 30 ML | Refills: 3 | Status: SHIPPED | OUTPATIENT
Start: 2024-06-05

## 2024-06-05 RX ORDER — PANTOPRAZOLE SODIUM 40 MG/1
40 TABLET, DELAYED RELEASE ORAL DAILY
Qty: 90 TABLET | Refills: 1 | Status: SHIPPED | OUTPATIENT
Start: 2024-06-05

## 2024-06-05 RX ORDER — ROSUVASTATIN CALCIUM 10 MG/1
10 TABLET, COATED ORAL DAILY
Qty: 90 TABLET | Refills: 1 | Status: SHIPPED | OUTPATIENT
Start: 2024-06-05

## 2024-06-05 RX ORDER — LEVOTHYROXINE SODIUM 100 UG/1
TABLET ORAL
Qty: 90 TABLET | Refills: 0 | Status: SHIPPED | OUTPATIENT
Start: 2024-06-05

## 2024-06-05 ASSESSMENT — PATIENT HEALTH QUESTIONNAIRE - PHQ9
2. FEELING DOWN, DEPRESSED OR HOPELESS: NOT AT ALL
SUM OF ALL RESPONSES TO PHQ9 QUESTIONS 1 AND 2: 0
1. LITTLE INTEREST OR PLEASURE IN DOING THINGS: NOT AT ALL

## 2024-06-05 NOTE — PROGRESS NOTES
Subjective   Patient ID: Paola Manley is a 59 y.o. female who presents for Annual Exam.  Today she is accompanied by alone.     HPI  1.  Healthcare maintenance  Overall patient is doing well.   Immunization: Tdap 2023  Influenza vaccine yearly  Shingrix up-to-date x 2  COVID-19 vaccine up-to-date  Colon Cancer Screening: Colonoscopy performed October 2023, 5-year clearance, due 2028  OB/GYN: Mammogram performed January 2024, Paps no longer needed secondary to history of hysterectomy  Diet: Attempting to eat a balanced diet  Exercise: Attempting to exercise more regularly  Tobacco: Former smoker  EtOH: Rarely/socially     2. Type 2 Diabetes  She was recently started on Mounjaro in November 2022 due to weight loss clinic.  Her last hemoglobin A1c was performed in December 2023 and showed a value of 5.4%  Not currently on an ACE inhibitor or ARB.  She is tolerating her medication.  Currently down 50+ pounds  Denies polydipsia or dry mouth.  Requesting refills of the Mounjaro and feels well  Unfortunately did gain some weight because she had to be placed on Ozempic due to feeling issues  Otherwise now back on the Mounjaro as stated above    3. Allergic rhinitis  She continues to use Flonase and Astelin as needed.  Requesting refills of both her Astelin and fluticasone  Denies sinus pressure, fever, chills, sore throat, chest pain, or shortness of breath.    4.  Constipation/ulcerative colitis  She is taking Linzess 290 mcg as needed.  Also noted that she is following up with Dr. Holman   She states that this helps when she notices that she is getting more constipated.  Requesting refills.  Did perform a colonoscopy in fall 2023 with a 5-year clearance    5. Hyperlipidemia  Currently taking rosuvastatin 10 mg daily.  She is tolerating the medication, denies myalgias.  Last cholesterol was great and stable with an LDL was at 80  Requesting refills    6. Hypothyroidism  In December we increased the dose of Levothyroxine  from 88 mcg to 100 mcg, but most recent TSH value is low at 0.15, with normal value of free T4  Stated that she has not been taking this on an empty stomach and wishes to discuss this further  Now wondering what is the next step    7.  GERD  Currently takes pantoprazole 40 mg daily.  States that this medication continues to help with her symptoms, and knows when she forgets to take her medication that her symptoms returns.  Last endoscopy was in 2016  Again, currently following up with gastroenterology    8. Vitamin B12 deficiency  In the past she has been receiving vitamin B12 injections.  However, since the beginning of COVID-19 pandemic, she did not want to come to the office monthly and had stopped getting her vitamin B12 shots.  B12 once again is low at 164  She continues to do B12 injections at home  Requesting refills    9.  Nicotine dependence  Continues to smoke a few cigarettes daily  Asking if she can get nicotine patches sent to her pharmacy    10.  Left knee pain  Continues to follow-up with the orthopedic provider for injections and even possibly considering a replacement    Current Outpatient Medications on File Prior to Visit   Medication Sig Dispense Refill    buPROPion XL (Wellbutrin XL) 150 mg 24 hr tablet Take 1 tablet (150 mg) by mouth once daily in the morning. Take before meals.      cloNIDine (Catapres) 0.2 mg tablet TAKE ONE (1) TABLET AT BEDTIME      diclofenac sodium (Voltaren) 1 % gel Apply 4.5 inches (4 g) topically 4 times a day as needed (for pain). 100 g 2    doxycycline (Adoxa) 150 mg tablet Take 1 tablet (150 mg) by mouth once daily.      DULoxetine (Cymbalta) 30 mg DR capsule Take 1 capsule (30 mg) by mouth once daily. To be taken in combination with duloxetine 60 mg daily for a total of 90 mg daily 30 capsule 5    DULoxetine (Cymbalta) 60 mg DR capsule Take 1 capsule (60 mg) by mouth once daily. To be taken in combination with duloxetine 30 mg daily for total dose of 90 mg daily  30 capsule 5    FreeStyle Latisha 2 Sensor kit Use as instructed 1 each 0    melatonin 10 mg capsule TAKE 1 CAPSULE Bedtime PRN      metaxalone (Skelaxin) 800 mg tablet TAKE ONE-HALF (1/2) TABLET TWO (2) TIMES A DAY AS NEEDED FOR MUSCLE SPASM 30 tablet 5    nicotine (Nicoderm CQ) 14 mg/24 hr patch Place 1 patch over 24 hours on the skin once every 24 hours. 30 patch 2    pregabalin (Lyrica) 200 mg capsule Take 1 capsule (200 mg) by mouth 3 times a day. 90 capsule 4    propranolol (Inderal) 10 mg tablet Take 1 tablet (10 mg) by mouth 3 times a day as needed.      semaglutide 2 mg/dose (8 mg/3 mL) pen injector Inject 2 mg under the skin 1 (one) time per week. 9 mL 0    Vyvanse 20 mg capsule Take 1 capsule (20 mg) by mouth once daily. Take in the morning      [DISCONTINUED] azelastine (Astelin) 137 mcg (0.1 %) nasal spray Administer 1 spray into each nostril 2 times a day. 30 mL 3    [DISCONTINUED] cyanocobalamin (Vitamin B-12) 1,000 mcg/mL injection Inject 1 mL (1,000 mcg) into the muscle every 30 (thirty) days. 1 mL 11    [DISCONTINUED] fluticasone (Flonase) 50 mcg/actuation nasal spray Administer 1 spray into each nostril 2 times a day. 18.2 g 3    [DISCONTINUED] levothyroxine (Synthroid, Levoxyl) 88 mcg tablet Take 1 tablet (88 mcg) by mouth once daily. 90 tablet 0    [DISCONTINUED] linaCLOtide (Linzess) 290 mcg capsule Take 1 capsule (290 mcg) by mouth once daily in the morning. Take before meals. Do not crush or chew. 90 capsule 1    [DISCONTINUED] pantoprazole (ProtoNix) 40 mg EC tablet Take 1 tablet (40 mg) by mouth once daily. 90 tablet 1    [DISCONTINUED] rosuvastatin (Crestor) 10 mg tablet Take 1 tablet (10 mg) by mouth once daily. 90 tablet 1    [DISCONTINUED] tirzepatide (Mounjaro) 15 mg/0.5 mL pen injector Inject 15 mg under the skin 1 (one) time per week. Inject 15 mg subcutaneously every week 6 mL 1     No current facility-administered medications on file prior to visit.        Allergies   Allergen  "Reactions    Bactrim [Sulfamethoxazole-Trimethoprim] GI Upset    Cephalexin GI Upset     GASTROINTESTIAL IRRITATION    Ciprofloxacin GI Upset     Reaction from Community: Other(See Desc)    Darvocet A500 [Propoxyphene N-Acetaminophen] Itching    Nsaids (Non-Steroidal Anti-Inflammatory Drug) Other     Hx of ulcerative colitis    Vicodin [Hydrocodone-Acetaminophen] Itching       Immunization History   Administered Date(s) Administered    Flu vaccine (IIV4), preservative free *Check age/dose* 10/11/2017, 12/20/2018, 09/28/2020, 11/24/2021, 12/29/2023    Hib (PRP-D) 12/21/2012    Influenza, Unspecified 11/24/2021    Influenza, seasonal, injectable 01/07/2015, 10/06/2015    Influenza, seasonal, injectable, preservative free 11/12/2013    Meningococcal MCV4, Unspecified 12/21/2012    Meningococcal, Unknown Serogroups 12/21/2012    Moderna COVID-19 vaccine, Fall 2023, 12 yeasrs and older (50mcg/0.5mL) 12/29/2023    Moderna SARS-CoV-2 Vaccination 04/08/2021, 05/06/2021, 01/17/2022    Pneumococcal Conjugate PCV 7 12/21/2012    Pneumococcal conjugate vaccine, 20-valent (PREVNAR 20) 12/15/2023    Tdap vaccine, age 7 year and older (BOOSTRIX, ADACEL) 11/11/2013, 12/15/2023    Zoster vaccine, recombinant, adult (SHINGRIX) 01/17/2022, 01/24/2023         Review of Systems  All pertinent positive symptoms are included in the history of present illness.  All other systems have been reviewed and are negative and noncontributory to this patient's current ailments.     Objective   /74 (BP Location: Left arm, Patient Position: Sitting, BP Cuff Size: Large adult)   Pulse 88   Ht 1.727 m (5' 8\")   Wt 98.4 kg (217 lb)   SpO2 99%   BMI 32.99 kg/m²   BSA: 2.17 meters squared  Lab on 05/23/2024   Component Date Value Ref Range Status    Thyroid Stimulating Hormone 05/23/2024 0.15 (L)  0.44 - 3.98 mIU/L Final    Thyroxine, Free 05/23/2024 1.09  0.61 - 1.12 ng/dL Final       Physical Exam  CONSTITUTIONAL - well nourished, well " developed, looks like stated age, in no acute distress, not ill-appearing, and not tired appearing  SKIN - normal skin color and pigmentation, normal skin turgor without rash, lesions, or nodules visualized  HEAD - no trauma, normocephalic  EYES - normal external exam  ENT - TM's intact, no injection, no signs of infection, uvula midline, normal tongue movement and throat normal, no exudate, nasal passage without discharge and patent  NECK - supple without rigidity, no neck mass was observed, no thyromegaly or thyroid nodules  CHEST - clear to auscultation, no wheezing, no crackles and no rales, good effort  CARDIAC - regular rate and regular rhythm, no skipped beats, no murmur  ABDOMEN - no organomegaly, soft, nontender, nondistended  EXTREMITIES - no edema, no deformities  NEUROLOGICAL - normal gait, normal balance, normal motor, no ataxia  PSYCHIATRIC - alert, pleasant and cordial, age-appropriate  IMMUNOLOGIC - no cervical lymphadenopathy    Assessment/Plan   1.  Healthcare maintenance  Complete history and physical examination was performed  EKG reveals normal sinus rhythm without acute changes  We will notify of test results once available and make treatment recommendations accordingly    2. Type 2 Diabetes  Continue Mounjaro   A1c one of the best on file  Continue the great work  We will repeat A1c after today's visit    3. Allergic rhinitis  happy to hear that your allergies appear to be well controlled with Flonase and Astelin.  I will send in other prescriptions at this time    4.  Constipation  Refills provided for Linzess 290 mcg daily as needed.  Otherwise, please follow-up with your gastroenterologist for continued care/recommendations    5. Hyperlipidemia  Cholesterol looks good  Continue medications without any changes, rosuvastatin 10 mg  CT cardiac score ordered to be done at your earliest mutual convenience to further evaluate for plaque/calcification within the coronary arteries    6.  Hypothyroidism  Due to your TSH being low which was surprising  We will provide a prescription of levothyroxine 88 mcg alongside 100 mcg and I would recommend to do these on an every other day fashion  Will repeat your lab work in the next 6-8 weeks    7. Esophageal reflux  Stable, continue to take pantoprazole 40 mg daily.    8. Vitamin B12 deficiency  Continue B12 injections  This was sent to your local pharmacy    9.  Nicotine dependence  Please let me know if you would like more nicotine patches  CT low-dose was ordered to be done at your earliest major convenience    10.  Left knee pain  Please continue following up with the orthopedic provider for further options/recommendations

## 2024-06-06 ENCOUNTER — HOSPITAL ENCOUNTER (OUTPATIENT)
Dept: RADIOLOGY | Facility: HOSPITAL | Age: 59
Discharge: HOME | End: 2024-06-06
Payer: COMMERCIAL

## 2024-06-06 ENCOUNTER — OFFICE VISIT (OUTPATIENT)
Dept: PAIN MEDICINE | Facility: HOSPITAL | Age: 59
End: 2024-06-06
Payer: COMMERCIAL

## 2024-06-06 VITALS
RESPIRATION RATE: 16 BRPM | DIASTOLIC BLOOD PRESSURE: 74 MMHG | WEIGHT: 217.8 LBS | BODY MASS INDEX: 33.01 KG/M2 | SYSTOLIC BLOOD PRESSURE: 127 MMHG | HEART RATE: 90 BPM | OXYGEN SATURATION: 97 % | HEIGHT: 68 IN

## 2024-06-06 DIAGNOSIS — M79.7 FIBROMYALGIA: ICD-10-CM

## 2024-06-06 DIAGNOSIS — M47.27 OSTEOARTHRITIS OF SPINE WITH RADICULOPATHY, LUMBOSACRAL REGION: ICD-10-CM

## 2024-06-06 DIAGNOSIS — M62.838 MUSCLE SPASM: ICD-10-CM

## 2024-06-06 DIAGNOSIS — M54.16 LUMBAR RADICULAR PAIN: ICD-10-CM

## 2024-06-06 DIAGNOSIS — M46.1 SACROILIITIS (CMS-HCC): Primary | ICD-10-CM

## 2024-06-06 LAB
EST. AVERAGE GLUCOSE BLD GHB EST-MCNC: 120 MG/DL
HBA1C MFR BLD: 5.8 %

## 2024-06-06 PROCEDURE — 3061F NEG MICROALBUMINURIA REV: CPT | Performed by: CLINICAL NURSE SPECIALIST

## 2024-06-06 PROCEDURE — 3048F LDL-C <100 MG/DL: CPT | Performed by: CLINICAL NURSE SPECIALIST

## 2024-06-06 PROCEDURE — 99214 OFFICE O/P EST MOD 30 MIN: CPT | Performed by: CLINICAL NURSE SPECIALIST

## 2024-06-06 PROCEDURE — 3044F HG A1C LEVEL LT 7.0%: CPT | Performed by: CLINICAL NURSE SPECIALIST

## 2024-06-06 PROCEDURE — 72120 X-RAY BEND ONLY L-S SPINE: CPT

## 2024-06-06 PROCEDURE — 3074F SYST BP LT 130 MM HG: CPT | Performed by: CLINICAL NURSE SPECIALIST

## 2024-06-06 PROCEDURE — 72114 X-RAY EXAM L-S SPINE BENDING: CPT | Performed by: STUDENT IN AN ORGANIZED HEALTH CARE EDUCATION/TRAINING PROGRAM

## 2024-06-06 PROCEDURE — 3078F DIAST BP <80 MM HG: CPT | Performed by: CLINICAL NURSE SPECIALIST

## 2024-06-06 PROCEDURE — RXMED WILLOW AMBULATORY MEDICATION CHARGE

## 2024-06-06 RX ORDER — METAXALONE 800 MG/1
TABLET ORAL
Qty: 30 TABLET | Refills: 5 | Status: SHIPPED | OUTPATIENT
Start: 2024-06-06

## 2024-06-06 RX ORDER — DULOXETIN HYDROCHLORIDE 60 MG/1
60 CAPSULE, DELAYED RELEASE ORAL DAILY
Qty: 30 CAPSULE | Refills: 5 | Status: SHIPPED | OUTPATIENT
Start: 2024-06-06 | End: 2024-07-06

## 2024-06-06 RX ORDER — DULOXETIN HYDROCHLORIDE 30 MG/1
30 CAPSULE, DELAYED RELEASE ORAL DAILY
Qty: 30 CAPSULE | Refills: 5 | Status: SHIPPED | OUTPATIENT
Start: 2024-06-06 | End: 2024-07-06

## 2024-06-06 RX ORDER — PREGABALIN 200 MG/1
200 CAPSULE ORAL 3 TIMES DAILY
Qty: 90 CAPSULE | Refills: 4 | Status: SHIPPED | OUTPATIENT
Start: 2024-06-06

## 2024-06-06 ASSESSMENT — ENCOUNTER SYMPTOMS
OCCASIONAL FEELINGS OF UNSTEADINESS: 1
DEPRESSION: 0
LOSS OF SENSATION IN FEET: 0

## 2024-06-06 NOTE — PROGRESS NOTES
Subjective   Patient ID: Paola Manley is a 59 y.o. female who presents for sacroiliitis, lumbar pain and fibromyalgia pain    HPI    59-year-old female with history of low back pain, sacroiliitis and fibromyalgia presents for follow-up.  She has done very well with bilateral SI injections receiving significant relief lasting up to 9 months.  He rates his widespread polyarticular/total body pain related to fibromyalgia as well as low back pain.  Pain accompanied by muscle tightness and spasms.  Previously sent for a formal course of physical therapy which the patient was unable to do.  States that she does home stretches and exercises.  Patient also has a significant osteoarthritis affecting her left knee and is followed by orthopedics.  She has done well with previous corticosteroid injections for her left knee.  Manages her pain with Lyrica 200 mg 3 times daily, duloxetine 90 mg/day, Skelaxin 400 mg twice daily as needed for muscle spasms.  She also may supplement with Tylenol, Lidoderm patches and Voltaren gel.  She presents at today's office visit with  Low back pain which will radiate into bilateral buttocks and into her groin.  Pain also radiating to her left thigh and occasionally to the level of her foot.  She has intermittent numbness and tingling in her left lower extremity and left foot.  She endorses weakness in her left lower extremity which she relates to the pain in her left knee.  She denies any changes in bowel/bladder function.  Her pain is aching, dull and can be sharp.  Pain increases with standing for long periods of time, going from seated to standing and bending.  She has a difficult time walking and has noticed that she is limping favoring her left lower extremity secondary to knee pain.  She also endorses widespread total body pain affecting multiple joints including her left knee.  Pain accompanied by muscle tightness and spasm through thoracic and lumbar region.  She has not completed a  formal course of physical therapy as previously prescribed.  She states she does home exercises and stretches.  Continues to benefit from Lyrica, duloxetine and using Skelaxin at bedtime for muscle tightness and spasm.  She has been supplementing with arthritis strength Tylenol taking 6/day as well as lidocaine patches and Voltaren gel.  She is scheduled to follow-up with her orthopedic surgeon and discuss potential gel injections for left knee pain.  Previous corticosteroid injection for knee pain in March provided 1 week of relief.      Location of Pain: pt is here for interval follow up. Pt states she has low back pain that radiates down the thigh, knee, and shin. Pt has fibromyalgia.          Pain Score: 6/10    Treatment:  Lyrica   Last dose: 200 mg TID    Efficacy: 90%    Side Effects: none    Other pain medication/neuromodulator: Tylenol arthritis -taking 6 a day/ 90mg Cymbalta total- needs refill/ Skelaxin-needs refill    Injections and/or Procedures: Dr Carlin- 3/20/24- lasted one week left knee- follow up 6/21/24- possibly trying gel injection/ Bilateral SI Injection- would like to repeat    Other:     Genetic Swab:  Urine Screen: 9/19/23  Narcotics Agreement:9/19/23  OA 6/6/24  [facility]:  Margarita Cole, APRN-CNP, APRN-CNS on 6/6/2024  8:05 AM  I have personally reviewed the OARRS report for Paola Manley. I have considered the risks of abuse, dependence, addiction and diversion    Is the patient prescribed a combination of a benzodiazepine and opioid?  No    Last Urine Drug Screen / ordered today: No  Recent Results (from the past 8760 hour(s))   Amphetamine Confirm, Urine    Collection Time: 09/19/23  8:30 AM   Result Value Ref Range    Amphetamines,Urine 4,500 ng/mL    MDA, Urine <200 ng/mL    MDEA, Urine <200 ng/mL    MDMA, Urine <200 ng/mL    Methamphetamine Quant, Ur <200 ng/mL    Phentermine,Urine <200 ng/mL   OPIATE/OPIOID/BENZO PRESCRIPTION COMPLIANCE    Collection Time: 09/19/23  8:30 AM   Result  Value Ref Range    DRUG SCREEN COMMENT URINE SEE BELOW     Creatine, Urine 98.1 mg/dL    Amphetamine Screen, Urine PRESUMPTIVE POSITIVE (A) NEGATIVE    Barbiturate Screen, Urine PRESUMPTIVE NEGATIVE NEGATIVE    Cannabinoid Screen, Urine PRESUMPTIVE NEGATIVE NEGATIVE    Cocaine Screen, Urine PRESUMPTIVE NEGATIVE NEGATIVE    PCP Screen, Urine PRESUMPTIVE NEGATIVE NEGATIVE    7-Aminoclonazepam <25 Cutoff <25 ng/mL    Alpha-Hydroxyalprazolam <25 Cutoff <25 ng/mL    Alpha-Hydroxymidazolam <25 Cutoff <25 ng/mL    Alprazolam <25 Cutoff <25 ng/mL    Chlordiazepoxide <25 Cutoff <25 ng/mL    Clonazepam <25 Cutoff <25 ng/mL    Diazepam <25 Cutoff <25 ng/mL    Lorazepam <25 Cutoff <25 ng/mL    Midazolam <25 Cutoff <25 ng/mL    Nordiazepam <25 Cutoff <25 ng/mL    Oxazepam <25 Cutoff <25 ng/mL    Temazepam <25 Cutoff <25 ng/mL    Zolpidem <25 Cutoff <25 ng/mL    Zolpidem Metabolite (ZCA) <25 Cutoff <25 ng/mL    6-Acetylmorphine <25 Cutoff <25 ng/mL    Codeine <50 Cutoff <50 ng/mL    Hydrocodone <25 Cutoff <25 ng/mL    Hydromorphone <25 Cutoff <25 ng/mL    Morphine Urine <50 Cutoff <50 ng/mL    Norhydrocodone <25 Cutoff <25 ng/mL    Noroxycodone <25 Cutoff <25 ng/mL    Oxycodone <25 Cutoff <25 ng/mL    Oxymorphone <25 Cutoff <25 ng/mL    Tramadol <50 Cutoff <50 ng/mL    O-Desmethyltramadol <50 Cutoff <50 ng/mL    Fentanyl <2.5 Cutoff<2.5 ng/mL    Norfentanyl <2.5 Cutoff<2.5 ng/mL    METHADONE CONFIRMATION,URINE <25 Cutoff <25 ng/mL    EDDP <25 Cutoff <25 ng/mL   Buprenorphine Confirm,Urine    Collection Time: 09/19/23  8:30 AM   Result Value Ref Range    BUPRENORPHINE GLUC, URINE <5 ng/mL    BUPRENORPHINE ,URINE <2 ng/mL    NALOXONE, URINE <100 ng/mL    NORBUPRENORPHINE GLUC,URINE <5 ng/mL    NORBUPRENORPHINE, URINE <2 ng/mL   Tapentadol Confirmation, Urine    Collection Time: 09/19/23  8:30 AM   Result Value Ref Range    Tapentadol <25 Cutoff <25 ng/mL    N-Desmethyltapentadol <25 Cutoff <25 ng/mL     Results are as expected.      Controlled Substance Agreement:  Date of the Last Agreement: 9/19/23  Reviewed Controlled Substance Agreement including but not limited to the benefits, risks, and alternatives to treatment with a Controlled Substance medication(s).    Monitoring and compliance:    ORT:    PDUQ:    Office Agreement:      Review of Systems    ROS:   General: No fevers, chills, weight loss  Skin: Negative for lesions  Eyes: No acute vision changes  Ears: No vertigo  Nose, mouth, throat: No difficulty swallowing or speaking  Respiratory: No cough, shortness of breath, cyanosis  Cardiovascular: Negative for chest pain syncope or palpitation  Gastrointestinal: No constipation, nausea, vomiting  Neurological: Negative for headache, positive for: Intermittent paresthesia and weakness left lower extremity  Psychological: Negative for severe or debilitating anxiety, depression. Negative memory loss  Musculoskeletal: Positive for arthralgia, myalgia, pain and spasms  Endocrine: Negative for weight gain, appetite changes, excessive sweating  Allergy/immune: Negative    All 13 systems were reviewed and are within normal levels except as noted or in the history of present illness.  Positive or pertinent negative responses are noted or were in the history of present illness. As noted, the patient denies significant or impairing weakness in the bilateral upper and lower extremities, medication induced constipation, and bowel or bladder incontinence.     Current Outpatient Medications:     azelastine (Astelin) 137 mcg (0.1 %) nasal spray, Administer 1 spray into each nostril 2 times a day., Disp: 30 mL, Rfl: 3    buPROPion XL (Wellbutrin XL) 150 mg 24 hr tablet, Take 1 tablet (150 mg) by mouth once daily in the morning. Take before meals., Disp: , Rfl:     cloNIDine (Catapres) 0.2 mg tablet, TAKE ONE (1) TABLET AT BEDTIME, Disp: , Rfl:     cyanocobalamin (Vitamin B-12) 1,000 mcg/mL injection, Inject 1 mL (1,000 mcg) into the muscle every 30  (thirty) days., Disp: 1 mL, Rfl: 11    diclofenac sodium (Voltaren) 1 % gel, Apply 4.5 inches (4 g) topically 4 times a day as needed (for pain)., Disp: 100 g, Rfl: 2    doxycycline (Adoxa) 150 mg tablet, Take 1 tablet (150 mg) by mouth once daily., Disp: , Rfl:     fluticasone (Flonase) 50 mcg/actuation nasal spray, Administer 1 spray into each nostril 2 times a day., Disp: 18.2 g, Rfl: 3    FreeStyle Latisha 2 Sensor kit, Use as instructed, Disp: 1 each, Rfl: 0    levothyroxine (Synthroid, Levoxyl) 100 mcg tablet, Please take by mouth every other day in an alternating fashion opposite of the 88 mcg, Disp: 90 tablet, Rfl: 0    levothyroxine (Synthroid, Levoxyl) 88 mcg tablet, Please take by mouth every other day in an alternating fashion opposite of the 100 mcg, Disp: 90 tablet, Rfl: 0    linaCLOtide (Linzess) 290 mcg capsule, Take 1 capsule (290 mcg) by mouth once daily in the morning. Take before meals. Do not crush or chew., Disp: 90 capsule, Rfl: 1    melatonin 10 mg capsule, TAKE 1 CAPSULE Bedtime PRN, Disp: , Rfl:     pantoprazole (ProtoNix) 40 mg EC tablet, Take 1 tablet (40 mg) by mouth once daily., Disp: 90 tablet, Rfl: 1    propranolol (Inderal) 10 mg tablet, Take 1 tablet (10 mg) by mouth 3 times a day as needed., Disp: , Rfl:     rosuvastatin (Crestor) 10 mg tablet, Take 1 tablet (10 mg) by mouth once daily., Disp: 90 tablet, Rfl: 1    [START ON 6/9/2024] tirzepatide (Mounjaro) 15 mg/0.5 mL pen injector, Inject 15 mg under the skin 1 (one) time per week. Inject 15 mg subcutaneously every week, Disp: 6 mL, Rfl: 1    Vyvanse 20 mg capsule, Take 1 capsule (20 mg) by mouth once daily. Take in the morning, Disp: , Rfl:     DULoxetine (Cymbalta) 30 mg DR capsule, Take 1 capsule (30 mg) by mouth once daily. To be taken in combination with duloxetine 60 mg daily for a total of 90 mg daily, Disp: 30 capsule, Rfl: 5    DULoxetine (Cymbalta) 60 mg DR capsule, Take 1 capsule (60 mg) by mouth once daily. To be taken  in combination with duloxetine 30 mg daily for total dose of 90 mg daily, Disp: 30 capsule, Rfl: 5    metaxalone (Skelaxin) 800 mg tablet, TAKE ONE-HALF (1/2) TABLET TWO (2) TIMES A DAY AS NEEDED FOR MUSCLE SPASM, Disp: 30 tablet, Rfl: 5    nicotine (Nicoderm CQ) 14 mg/24 hr patch, Place 1 patch over 24 hours on the skin once every 24 hours. (Patient not taking: Reported on 6/6/2024), Disp: 30 patch, Rfl: 2    pregabalin (Lyrica) 200 mg capsule, Take 1 capsule (200 mg) by mouth 3 times a day., Disp: 90 capsule, Rfl: 4     Past Medical History:   Diagnosis Date    Depression, unspecified     Depressive disorder    Medial meniscus tear 04/24/2023    Personal history of other diseases of the digestive system 01/12/2016    History of esophageal reflux    Personal history of other diseases of the respiratory system 08/13/2018    History of acute sinusitis    Personal history of other endocrine, nutritional and metabolic disease     History of hypothyroidism    Personal history of other specified conditions     History of insomnia    Prediabetes     Prediabetes    Tear of meniscus of right knee 04/24/2023        Past Surgical History:   Procedure Laterality Date    APPENDECTOMY  01/12/2016    Appendectomy    CHOLECYSTECTOMY  01/12/2016    Cholecystectomy    HERNIA REPAIR  01/12/2016    Inguinal Hernia Repair    HYSTERECTOMY  11/07/2017    Hysterectomy    MR HEAD ANGIO WO IV CONTRAST  2/6/2019    MR HEAD ANGIO WO IV CONTRAST 2/6/2019 [facility]    MR NECK ANGIO WO IV CONTRAST  2/6/2019    MR NECK ANGIO WO IV CONTRAST 2/6/2019 [facility]    OTHER SURGICAL HISTORY  08/12/2020    Knee arthroscopy    SINUS SURGERY  01/12/2016    Sinus Surgery    SPLENECTOMY, TOTAL  01/12/2016    Splenectomy    TOTAL ABDOMINAL HYSTERECTOMY W/ BILATERAL SALPINGOOPHORECTOMY  01/12/2016    Total Abdominal Hysterectomy With Removal Of Both Ovaries        Family History   Problem Relation Name Age of Onset    Arthritis Mother    "   Heart disease Mother      Other (CVA) Mother      Coronary artery disease Mother      Diabetes Mother      Osteoporosis Mother      Alcohol abuse Father      Hypertension Father      Lung cancer Father      Diabetes Maternal Grandmother      Liver cancer Maternal Grandmother      Throat cancer Maternal Grandfather      Osteoarthritis Other          Grandmother        Allergies   Allergen Reactions    Bactrim [Sulfamethoxazole-Trimethoprim] GI Upset    Cephalexin GI Upset     GASTROINTESTIAL IRRITATION    Ciprofloxacin GI Upset     Reaction from Community: Other(See Desc)    Darvocet A500 [Propoxyphene N-Acetaminophen] Itching    Nsaids (Non-Steroidal Anti-Inflammatory Drug) Other     Hx of ulcerative colitis    Vicodin [Hydrocodone-Acetaminophen] Itching        Objective     Visit Vitals  /74   Pulse 90   Resp 16   Ht 1.727 m (5' 8\")   Wt 98.8 kg (217 lb 12.8 oz)   SpO2 97%   BMI 33.12 kg/m²   OB Status Postmenopausal   Smoking Status Every Day   BSA 2.18 m²        Physical Exam    PE:  General: Well-developed, well-nourished, no acute distress. The patient demonstrates no pain behavior, symptom magnification or overt drug-seeking behavior.  Eye: Pupils appropriate for room lighting  Neck/thyroid: No obvious goiter or enlargement of neck noted  Respiratory exam: Normal respiratory effort, unlabored respiration. No accessory muscle use noted  Cardiac exam: Bilateral radial pulses intact  Abdominal: Nondistended  Spine, lumbar: The patient is able to rise from a seated to standing position without hesitancy, push off, or delay. Gait is limping favoring left lower extremity.  Patient will externally rotate her left foot with ambulation.  Tenderness to paraspinous musculature is noted throughout thoracic and lower lumbar spine.  Positive straight leg raise left lower extremity.  Exquisite tenderness over bilateral SI joints.  Positive Gaenslen's, Edinson and sacral thrust bilaterally.  Neurologic exam: Muscle " strength is antigravity in all 4 extremities.  Equal strength bilateral lower extremities 5/5.  Normal sensation bilateral lower extremities.  Psychiatric exam: Judgment and insight normal, affect normal, speech is fluent, affect appropriate, demonstrating no signs of hypersomnolence, sedation, or confusion          Assessment/Plan   Problem List Items Addressed This Visit             ICD-10-CM    Fibromyalgia M79.7    Relevant Medications    DULoxetine (Cymbalta) 30 mg DR capsule    DULoxetine (Cymbalta) 60 mg DR capsule    pregabalin (Lyrica) 200 mg capsule    Other Relevant Orders    Referral to Physical Therapy    Spondylosis of lumbosacral region M47.817    Sacroiliitis (CMS-[facility]) - Primary M46.1     59-year-old female with history of low back pain, sacroiliitis and fibromyalgia who has done very well with bilateral SI injections with long-lasting relief.  Patient presents at today's office visit with symptoms and exam most consistent with fibromyalgia type pain, sacroiliitis as well as left knee pain related to osteoarthritis.  She has done well with previous SI joint injections with last injection in August 2023 lasting approximately 9 to 10 months.  She has noted return of symptoms radiating from her low back into her bilateral buttock and groin.  Pain may also radiate from her low back into her left lower extremity to the level of her foot.  She is endorsing numbness and tingling in her left lower extremity as well as intermittent weakness.  She is also experiencing an exacerbation of left knee pain and is scheduled to follow-up with orthopedics.  We have previously discussed the addition of a formal course of physical therapy to target fibromyalgia type pain, SI pain as well as low back pain.  Explained to patient that physical therapy at this point would probably not help her knee pain, however, it may improve her other pain generators.  The patient has an altered gait secondary to left knee pain which may  be contributing to her low back pain.  Recommending physical therapy to assess her posture and gait.  I will send the patient for a formal course of physical therapy targeting fibromyalgia pain, low back radicular symptoms as well as SI pain.  Advised patient to complete 6 to 8 weeks of physical therapy.  We will also send the patient for baseline lumbar x-ray to evaluate for any lumbar pathology.  Patient would like to repeat her bilateral SI joint injections as she has received significant/sustained relief in the past.  She will follow-up with her orthopedic surgeon and is scheduled for an appointment on 6/21/2024 to discuss potential gel injections for her left knee.  Recommended ice alternating with heat for joint pain as well as Voltaren gel.  Also recommended patient continue her Lyrica, duloxetine and Skelaxin for muscle tightness and spasm.  Currently patient is using her Skelaxin sparingly taking at bedtime when needed.  Recommended that she may use it during the day if it does not cause drowsiness or dizziness.  Recommended that she may continue to supplement with Tylenol, however, limit her total Tylenol dose to 3000 mg in 24 hours.  The patient will follow-up in 6 to 8 weeks after her SI joint injection and she has completed 6 to 8 weeks of physical therapy for reevaluation.  Plan discussed with patient at today's office visit.    -Patient scheduled to repeat bilateral SI joint injections.  Reviewed risks and benefits and the patient would like to proceed.  CPT 52486.  -Sending patient for a baseline lumbar x-ray to evaluate lumbar radicular symptoms.  -Provided a referral for a formal course of physical therapy targeting fibromyalgia, lumbar radicular symptoms, SI pain as well as posture/gait.  -We will continue pregabalin 200 mg 3 times daily as needed.  Patient feels that this medication has been very beneficial and continues to receive excellent relief without adverse effects.  -We will continue  duloxetine 90 mg daily.  Per patient her psychiatrist is in agreement with this dose and would like us to take over prescribing this medication.  -Patient may continue to supplement with Tylenol; advised to limit total Tylenol dose to less than 3000 mg in 24 hours.    -She may supplement with lidocaine patches and Voltaren gel as needed.  -Due to the patient being on multiple medications that may affect serotonin we will watch closely for serotonin syndrome.  Currently denies any symptoms consistent with serotonin syndrome.  -Given the patient's report of fibromyalgia pain  I discussed with her in detail the recommendations most current with the [employer] in the treatment of fibromyalgia.  This includes most recent research showing that the most effective treatment for fibromyalgia is physical exercise.  Cognitive behavioral therapy has also been shown to have positive effects towards pain and other symptoms related to fibromyalgia.  Adjuvant treatments including acupuncture chiropractic and massage therapy have also been beneficial.  Specifically related to medication the FDA has approved few medications directly for the treatment of fibromyalgia and these include Cymbalta and Savella as well as older medications including Elavil.  Medications outside of the serotonin and norepinephrine realms include muscle relaxants as well as Lyrica and gabapentin.  Most importantly the College's recommendation is to avoid opiate narcotic medication for treating fibromyalgia the evidence shows at these drugs are not helpful to most people with fibromyalgia and in fact will cause greater pain sensitivity or make pain persistent    -Patient will follow-up in 6 to 8 weeks after SI joint injections and completing physical therapy.  Further recommendations based on imaging and physical therapy.    Disclaimer: This note was transcribed using an audio transcription device.  As such, minor errors may be present with  regard to spelling, punctuation, and inadvertent word insertion.  Please disregard such errors.           Relevant Medications    pregabalin (Lyrica) 200 mg capsule    Other Relevant Orders    Sacroiliac Joint Injection    Referral to Physical Therapy     Other Visit Diagnoses         Codes    Muscle spasm     M62.838    Relevant Medications    metaxalone (Skelaxin) 800 mg tablet    Lumbar radicular pain     M54.16    Relevant Medications    pregabalin (Lyrica) 200 mg capsule    Other Relevant Orders    Referral to Physical Therapy    XR lumbar spine 4+ views w flexion extension

## 2024-06-06 NOTE — RESULT ENCOUNTER NOTE
Vitamin B12 continues to be low at 184  I recommend continued supplementation    Sugar, kidneys, electrolytes are all within normal limits and/or stable    Complete blood cell count shows no anemia    Vitamin D is well within normal limits at 73    Urine albumin within normal limits    Cholesterol looks stable at 158, HDL 61, LDL 82, triglycerides 73    We are just waiting for the hemoglobin A1c at this time

## 2024-06-06 NOTE — ASSESSMENT & PLAN NOTE
59-year-old female with history of low back pain, sacroiliitis and fibromyalgia who has done very well with bilateral SI injections with long-lasting relief.  Patient presents at today's office visit with symptoms and exam most consistent with fibromyalgia type pain, sacroiliitis as well as left knee pain related to osteoarthritis.  She has done well with previous SI joint injections with last injection in August 2023 lasting approximately 9 to 10 months.  She has noted return of symptoms radiating from her low back into her bilateral buttock and groin.  Pain may also radiate from her low back into her left lower extremity to the level of her foot.  She is endorsing numbness and tingling in her left lower extremity as well as intermittent weakness.  She is also experiencing an exacerbation of left knee pain and is scheduled to follow-up with orthopedics.  We have previously discussed the addition of a formal course of physical therapy to target fibromyalgia type pain, SI pain as well as low back pain.  Explained to patient that physical therapy at this point would probably not help her knee pain, however, it may improve her other pain generators.  The patient has an altered gait secondary to left knee pain which may be contributing to her low back pain.  Recommending physical therapy to assess her posture and gait.  I will send the patient for a formal course of physical therapy targeting fibromyalgia pain, low back radicular symptoms as well as SI pain.  Advised patient to complete 6 to 8 weeks of physical therapy.  We will also send the patient for baseline lumbar x-ray to evaluate for any lumbar pathology.  Patient would like to repeat her bilateral SI joint injections as she has received significant/sustained relief in the past.  She will follow-up with her orthopedic surgeon and is scheduled for an appointment on 6/21/2024 to discuss potential gel injections for her left knee.  Recommended ice alternating with  heat for joint pain as well as Voltaren gel.  Also recommended patient continue her Lyrica, duloxetine and Skelaxin for muscle tightness and spasm.  Currently patient is using her Skelaxin sparingly taking at bedtime when needed.  Recommended that she may use it during the day if it does not cause drowsiness or dizziness.  Recommended that she may continue to supplement with Tylenol, however, limit her total Tylenol dose to 3000 mg in 24 hours.  The patient will follow-up in 6 to 8 weeks after her SI joint injection and she has completed 6 to 8 weeks of physical therapy for reevaluation.  Plan discussed with patient at today's office visit.    -Patient scheduled to repeat bilateral SI joint injections.  Reviewed risks and benefits and the patient would like to proceed.  CPT 26867.  -Sending patient for a baseline lumbar x-ray to evaluate lumbar radicular symptoms.  -Provided a referral for a formal course of physical therapy targeting fibromyalgia, lumbar radicular symptoms, SI pain as well as posture/gait.  -We will continue pregabalin 200 mg 3 times daily as needed.  Patient feels that this medication has been very beneficial and continues to receive excellent relief without adverse effects.  -We will continue duloxetine 90 mg daily.  Per patient her psychiatrist is in agreement with this dose and would like us to take over prescribing this medication.  -Patient may continue to supplement with Tylenol; advised to limit total Tylenol dose to less than 3000 mg in 24 hours.    -She may supplement with lidocaine patches and Voltaren gel as needed.  -Due to the patient being on multiple medications that may affect serotonin we will watch closely for serotonin syndrome.  Currently denies any symptoms consistent with serotonin syndrome.  -Given the patient's report of fibromyalgia pain  I discussed with her in detail the recommendations most current with the [employer] in the treatment of fibromyalgia.   This includes most recent research showing that the most effective treatment for fibromyalgia is physical exercise.  Cognitive behavioral therapy has also been shown to have positive effects towards pain and other symptoms related to fibromyalgia.  Adjuvant treatments including acupuncture chiropractic and massage therapy have also been beneficial.  Specifically related to medication the FDA has approved few medications directly for the treatment of fibromyalgia and these include Cymbalta and Savella as well as older medications including Elavil.  Medications outside of the serotonin and norepinephrine realms include muscle relaxants as well as Lyrica and gabapentin.  Most importantly the College's recommendation is to avoid opiate narcotic medication for treating fibromyalgia the evidence shows at these drugs are not helpful to most people with fibromyalgia and in fact will cause greater pain sensitivity or make pain persistent    -Patient will follow-up in 6 to 8 weeks after SI joint injections and completing physical therapy.  Further recommendations based on imaging and physical therapy.    Disclaimer: This note was transcribed using an audio transcription device.  As such, minor errors may be present with regard to spelling, punctuation, and inadvertent word insertion.  Please disregard such errors.

## 2024-06-11 ENCOUNTER — APPOINTMENT (OUTPATIENT)
Dept: PAIN MEDICINE | Facility: HOSPITAL | Age: 59
End: 2024-06-11
Payer: COMMERCIAL

## 2024-06-12 ENCOUNTER — APPOINTMENT (OUTPATIENT)
Dept: PAIN MEDICINE | Facility: HOSPITAL | Age: 59
End: 2024-06-12
Payer: COMMERCIAL

## 2024-06-14 ENCOUNTER — PHARMACY VISIT (OUTPATIENT)
Dept: PHARMACY | Facility: CLINIC | Age: 59
End: 2024-06-14
Payer: COMMERCIAL

## 2024-06-21 ENCOUNTER — APPOINTMENT (OUTPATIENT)
Dept: ORTHOPEDIC SURGERY | Facility: CLINIC | Age: 59
End: 2024-06-21
Payer: COMMERCIAL

## 2024-06-21 VITALS — BODY MASS INDEX: 31.07 KG/M2 | HEIGHT: 68 IN | WEIGHT: 205 LBS

## 2024-06-21 DIAGNOSIS — M17.12 ARTHRITIS OF LEFT KNEE: Primary | ICD-10-CM

## 2024-06-21 PROCEDURE — 3061F NEG MICROALBUMINURIA REV: CPT

## 2024-06-21 PROCEDURE — 99024 POSTOP FOLLOW-UP VISIT: CPT

## 2024-06-21 PROCEDURE — 20610 DRAIN/INJ JOINT/BURSA W/O US: CPT

## 2024-06-21 PROCEDURE — 3044F HG A1C LEVEL LT 7.0%: CPT

## 2024-06-21 PROCEDURE — 3048F LDL-C <100 MG/DL: CPT

## 2024-06-21 ASSESSMENT — PAIN SCALES - GENERAL: PAINLEVEL_OUTOF10: 5 - MODERATE PAIN

## 2024-06-21 ASSESSMENT — PAIN DESCRIPTION - DESCRIPTORS: DESCRIPTORS: ACHING;TIGHTNESS

## 2024-06-21 ASSESSMENT — PAIN - FUNCTIONAL ASSESSMENT: PAIN_FUNCTIONAL_ASSESSMENT: 0-10

## 2024-06-21 NOTE — PROGRESS NOTES
Paola Manley is a 59 y.o. female here for gel injection  Chief Complaint   Patient presents with    Left Knee - Pain     Duralone injection     We discussed options for the treatment of her knee pain.  She is unable to take NSAIDs due to ulcerative colitis and Tylenol is not helping her.  Her steroid injections are no longer helping so we are attempting gel injections to see if that would help.  We discussed that she is able to set up a appointment with Dr. Carlin in the future with the intent of surgery in mind.     L Inj/Asp: L knee on 6/21/2024 11:52 AM  Indications: pain and joint swelling  Details: 22 G needle, anterolateral approach  Medications: 60 mg sodium hyaluronate 60 mg/3 mL    Discussion:  I discussed the conservative treatment options for knee osteoarthritis including but not limited to physical therapy, oral NSAIDS, activity and lifestyle modification, hyaluronic acid injections and corticosteroid injections. Pt has elected to undergo a hyaluronic acid injection today. I have explained the risk and benefits of an injection including the possibility of joint infection, bleeding, damage to cartilage, allergic reaction. Patient verbalized understanding and gave verbal consent wishes to proceed with a intra-articular hyaluronic acid injection for their knee.    Procedure:  After discussing the risk and benefits of the procedure, we proceeded with an intra-articular left knee injection. We discussed the risks and benefits and potential morbidity related to the treatment, and to the prescription medication administered in the injection    With the patient's informed verbal consent, the left knee were prepped in standard sterile fashion with Chlorhexidine. The skin was then anesthetized with ethyl chloride spray and cleaned again with Chlorhexidine. The left knee was then apirated/injected with a prefilled 20-gauge syringe of 3ml/60mg Durolane using the lateral approach without complications.  The patient  tolerated this well.  A bandaid was applied and the patient ambulated out of the clinic on ther own accord without difficulty. Patient was instructed to avoid physical activity for 24-48 hours to prevent the knees from swelling and may ice the knee as tolerated. Patient should contact the office if any signs of of infection appear: redness, fever, chills, drainage, swelling or warmth to the knee.        Procedure, treatment alternatives, risks and benefits explained, specific risks discussed. Consent was given by the patient. Immediately prior to procedure a time out was called to verify the correct patient, procedure, equipment, support staff and site/side marked as required. Patient was prepped and draped in the usual sterile fashion.

## 2024-06-24 DIAGNOSIS — M46.1 SACROILIITIS (CMS-HCC): Primary | ICD-10-CM

## 2024-07-12 ENCOUNTER — HOSPITAL ENCOUNTER (OUTPATIENT)
Dept: RADIOLOGY | Facility: HOSPITAL | Age: 59
Discharge: HOME | End: 2024-07-12
Payer: COMMERCIAL

## 2024-07-12 ENCOUNTER — PHARMACY VISIT (OUTPATIENT)
Dept: PHARMACY | Facility: CLINIC | Age: 59
End: 2024-07-12
Payer: COMMERCIAL

## 2024-07-12 ENCOUNTER — HOSPITAL ENCOUNTER (OUTPATIENT)
Dept: GASTROENTEROLOGY | Facility: HOSPITAL | Age: 59
Discharge: HOME | End: 2024-07-12
Payer: COMMERCIAL

## 2024-07-12 VITALS
DIASTOLIC BLOOD PRESSURE: 56 MMHG | HEIGHT: 68 IN | SYSTOLIC BLOOD PRESSURE: 103 MMHG | OXYGEN SATURATION: 93 % | TEMPERATURE: 97.3 F | HEART RATE: 68 BPM | RESPIRATION RATE: 16 BRPM | BODY MASS INDEX: 31.83 KG/M2 | WEIGHT: 210 LBS

## 2024-07-12 DIAGNOSIS — M46.1 SACROILIITIS (CMS-HCC): ICD-10-CM

## 2024-07-12 PROCEDURE — RXMED WILLOW AMBULATORY MEDICATION CHARGE

## 2024-07-12 PROCEDURE — 2550000001 HC RX 255 CONTRASTS: Performed by: PHYSICAL MEDICINE & REHABILITATION

## 2024-07-12 PROCEDURE — 2500000004 HC RX 250 GENERAL PHARMACY W/ HCPCS (ALT 636 FOR OP/ED): Performed by: PHYSICAL MEDICINE & REHABILITATION

## 2024-07-12 PROCEDURE — 2500000005 HC RX 250 GENERAL PHARMACY W/O HCPCS: Performed by: PHYSICAL MEDICINE & REHABILITATION

## 2024-07-12 RX ORDER — LIDOCAINE HYDROCHLORIDE 5 MG/ML
INJECTION, SOLUTION INFILTRATION; INTRAVENOUS AS NEEDED
Status: COMPLETED | OUTPATIENT
Start: 2024-07-12 | End: 2024-07-12

## 2024-07-12 RX ORDER — LINACLOTIDE 290 UG/1
290 CAPSULE, GELATIN COATED ORAL
Qty: 30 CAPSULE | Refills: 4 | OUTPATIENT
Start: 2024-06-05

## 2024-07-12 RX ORDER — METHYLPREDNISOLONE ACETATE 40 MG/ML
INJECTION, SUSPENSION INTRA-ARTICULAR; INTRALESIONAL; INTRAMUSCULAR; SOFT TISSUE AS NEEDED
Status: COMPLETED | OUTPATIENT
Start: 2024-07-12 | End: 2024-07-12

## 2024-07-12 ASSESSMENT — PAIN SCALES - GENERAL: PAINLEVEL_OUTOF10: 2

## 2024-07-12 ASSESSMENT — PAIN - FUNCTIONAL ASSESSMENT: PAIN_FUNCTIONAL_ASSESSMENT: 0-10

## 2024-07-12 ASSESSMENT — COLUMBIA-SUICIDE SEVERITY RATING SCALE - C-SSRS
6. HAVE YOU EVER DONE ANYTHING, STARTED TO DO ANYTHING, OR PREPARED TO DO ANYTHING TO END YOUR LIFE?: NO
1. IN THE PAST MONTH, HAVE YOU WISHED YOU WERE DEAD OR WISHED YOU COULD GO TO SLEEP AND NOT WAKE UP?: NO
2. HAVE YOU ACTUALLY HAD ANY THOUGHTS OF KILLING YOURSELF?: NO

## 2024-07-12 NOTE — H&P
History Of Present Illness  Paola Manley is a 59 y.o. female presenting with sacroiliitis.     Past Medical History  Past Medical History:   Diagnosis Date    Depression, unspecified     Depressive disorder    Medial meniscus tear 04/24/2023    Personal history of other diseases of the digestive system 01/12/2016    History of esophageal reflux    Personal history of other diseases of the respiratory system 08/13/2018    History of acute sinusitis    Personal history of other endocrine, nutritional and metabolic disease     History of hypothyroidism    Personal history of other specified conditions     History of insomnia    Prediabetes     Prediabetes    Tear of meniscus of right knee 04/24/2023       Surgical History  Past Surgical History:   Procedure Laterality Date    APPENDECTOMY  01/12/2016    Appendectomy    CHOLECYSTECTOMY  01/12/2016    Cholecystectomy    HERNIA REPAIR  01/12/2016    Inguinal Hernia Repair    HYSTERECTOMY  11/07/2017    Hysterectomy    MR HEAD ANGIO WO IV CONTRAST  2/6/2019    MR HEAD ANGIO WO IV CONTRAST 2/6/2019 [facility]    MR NECK ANGIO WO IV CONTRAST  2/6/2019    MR NECK ANGIO WO IV CONTRAST 2/6/2019 [facility]    OTHER SURGICAL HISTORY  08/12/2020    Knee arthroscopy    SINUS SURGERY  01/12/2016    Sinus Surgery    SPLENECTOMY, TOTAL  01/12/2016    Splenectomy    TOTAL ABDOMINAL HYSTERECTOMY W/ BILATERAL SALPINGOOPHORECTOMY  01/12/2016    Total Abdominal Hysterectomy With Removal Of Both Ovaries        Social History  She reports that she has been smoking cigarettes. She has never used smokeless tobacco. No history on file for alcohol use and drug use.    Family History  Family History   Problem Relation Name Age of Onset    Arthritis Mother      Heart disease Mother      Other (CVA) Mother      Coronary artery disease Mother      Diabetes Mother      Osteoporosis Mother      Alcohol abuse Father      Hypertension Father      Lung cancer Father      Diabetes  Maternal Grandmother      Liver cancer Maternal Grandmother      Throat cancer Maternal Grandfather      Osteoarthritis Other          Grandmother        Allergies  Bactrim [sulfamethoxazole-trimethoprim], Cephalexin, Ciprofloxacin, Darvocet a500 [propoxyphene n-acetaminophen], Nsaids (non-steroidal anti-inflammatory drug), and Vicodin [hydrocodone-acetaminophen]    Review of Systems     Physical Exam     Last Recorded Vitals  There were no vitals taken for this visit.    Relevant Results        GENERAL EXAM  Vital Signs: Vital signs to include heart rate, respiration rate, blood pressure, and temperature were reviewed.  General Appearance:  Awake, alert, healthy appearing, well developed, No acute distress.  Head: Normocephalic without evidence of head injury.  Neck: The appearance of the neck was normal without swelling with a midline trachea.  Eyes: The eyelids and eyebrows exhibited no abnormalities.  Pupils were not pin-point.  Sclera was without icterus.  Lungs: Respiration rhythm and depth was normal.  Respiratory movements were normal without labored breathing.  Cardiovascular: No peripheral edema was present.    Neurological: Patient was oriented to time, place, and person.  Speech was normal.  Balance, gait, and stance were unremarkable.    Psychiatric: Appearance was normal with appropriate dress.  Mood was euthymic and affect was normal.  Skin: Affected regions were without ecchymosis or skin lesions.        Physical exam as above except:     Assessment/Plan   Active Problems:  There are no active Hospital Problems.      Patient here with bilateral sacroiliitis here for bilateral SI joint injection.           Gil Matthew MD

## 2024-07-12 NOTE — DISCHARGE INSTRUCTIONS
You had a pain management procedure today.    Observe/ monitor for the following signs & symptoms:  If you notice Excessive bleeding (slow general oozing that completely soaks the dressing, or fresh bright red bleeding).   In either case, apply pressure to the area, elevate it if possible & call your doctor at once.    Also observe for:  Change in color  Numbness/tingling  Coldness to the touch  Swelling  Drainage  Temperature of 101.5 or higher.  Increased, uncontrollable pain.    *If you notice the above signs & symptoms, please call your doctor right away!*      Discharge Instructions:    Your pain may not be gone immediately after this procedure; it generally takes 3 to 5 days for the steroid to work.   Keep the needle site clean & dry for 24 hours.  Continue your present medications.  Make an appointment to see your doctor in 2-3 weeks.  If any problems occur, or if you have any further questions, please call as soon as possible. If you find that you cannot reach your doctor, but feel that the condition nees a doctor's attention, go to the closest emergency department & take this discharge paper with you.       Dr. Matthew's Office: [phone]

## 2024-07-18 PROCEDURE — RXMED WILLOW AMBULATORY MEDICATION CHARGE

## 2024-07-29 DIAGNOSIS — E03.9 HYPOTHYROIDISM, UNSPECIFIED TYPE: ICD-10-CM

## 2024-07-30 ENCOUNTER — PHARMACY VISIT (OUTPATIENT)
Dept: PHARMACY | Facility: CLINIC | Age: 59
End: 2024-07-30
Payer: COMMERCIAL

## 2024-08-06 PROCEDURE — RXMED WILLOW AMBULATORY MEDICATION CHARGE

## 2024-08-14 ENCOUNTER — PHARMACY VISIT (OUTPATIENT)
Dept: PHARMACY | Facility: CLINIC | Age: 59
End: 2024-08-14
Payer: COMMERCIAL

## 2024-08-14 ENCOUNTER — OFFICE VISIT (OUTPATIENT)
Dept: PAIN MEDICINE | Facility: HOSPITAL | Age: 59
End: 2024-08-14
Payer: COMMERCIAL

## 2024-08-14 VITALS
HEIGHT: 68 IN | OXYGEN SATURATION: 98 % | SYSTOLIC BLOOD PRESSURE: 121 MMHG | WEIGHT: 210 LBS | RESPIRATION RATE: 16 BRPM | DIASTOLIC BLOOD PRESSURE: 82 MMHG | BODY MASS INDEX: 31.83 KG/M2 | HEART RATE: 72 BPM

## 2024-08-14 DIAGNOSIS — Z79.899 LONG-TERM USE OF HIGH-RISK MEDICATION: Primary | ICD-10-CM

## 2024-08-14 DIAGNOSIS — M46.1 SACROILIITIS (CMS-HCC): ICD-10-CM

## 2024-08-14 DIAGNOSIS — M79.7 FIBROMYALGIA: ICD-10-CM

## 2024-08-14 DIAGNOSIS — M25.50 ARTHRALGIA, UNSPECIFIED JOINT: ICD-10-CM

## 2024-08-14 LAB
AMPHETAMINES UR QL SCN: ABNORMAL
BARBITURATES UR QL SCN: ABNORMAL
BZE UR QL SCN: ABNORMAL
CANNABINOIDS UR QL SCN: ABNORMAL
CREAT UR-MCNC: 169.2 MG/DL (ref 20–320)
ETHANOL ?TM UR-MCNC: <10 MG/DL
PCP UR QL SCN: ABNORMAL

## 2024-08-14 PROCEDURE — 3044F HG A1C LEVEL LT 7.0%: CPT | Performed by: CLINICAL NURSE SPECIALIST

## 2024-08-14 PROCEDURE — 99214 OFFICE O/P EST MOD 30 MIN: CPT | Performed by: CLINICAL NURSE SPECIALIST

## 2024-08-14 PROCEDURE — 3074F SYST BP LT 130 MM HG: CPT | Performed by: CLINICAL NURSE SPECIALIST

## 2024-08-14 PROCEDURE — 3048F LDL-C <100 MG/DL: CPT | Performed by: CLINICAL NURSE SPECIALIST

## 2024-08-14 PROCEDURE — 3079F DIAST BP 80-89 MM HG: CPT | Performed by: CLINICAL NURSE SPECIALIST

## 2024-08-14 PROCEDURE — 80307 DRUG TEST PRSMV CHEM ANLYZR: CPT | Mod: PORLAB | Performed by: CLINICAL NURSE SPECIALIST

## 2024-08-14 PROCEDURE — 80348 DRUG SCREENING BUPRENORPHINE: CPT | Performed by: CLINICAL NURSE SPECIALIST

## 2024-08-14 PROCEDURE — 3061F NEG MICROALBUMINURIA REV: CPT | Performed by: CLINICAL NURSE SPECIALIST

## 2024-08-14 PROCEDURE — 80324 DRUG SCREEN AMPHETAMINES 1/2: CPT | Mod: PORLAB | Performed by: CLINICAL NURSE SPECIALIST

## 2024-08-14 PROCEDURE — 3008F BODY MASS INDEX DOCD: CPT | Performed by: CLINICAL NURSE SPECIALIST

## 2024-08-14 ASSESSMENT — COLUMBIA-SUICIDE SEVERITY RATING SCALE - C-SSRS
1. IN THE PAST MONTH, HAVE YOU WISHED YOU WERE DEAD OR WISHED YOU COULD GO TO SLEEP AND NOT WAKE UP?: NO
2. HAVE YOU ACTUALLY HAD ANY THOUGHTS OF KILLING YOURSELF?: NO
6. HAVE YOU EVER DONE ANYTHING, STARTED TO DO ANYTHING, OR PREPARED TO DO ANYTHING TO END YOUR LIFE?: NO

## 2024-08-14 ASSESSMENT — ENCOUNTER SYMPTOMS
OCCASIONAL FEELINGS OF UNSTEADINESS: 1
DEPRESSION: 0
LOSS OF SENSATION IN FEET: 0
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
## 2024-08-18 LAB
BUPRENORPHINE UR-MCNC: <2 NG/ML
BUPRENORPHINE UR-MCNC: <5 NG/ML
NALOXONE UR CFM-MCNC: <100 NG/ML
NORBUPRENORPHINE UR CFM-MCNC: <5 NG/ML
NORBUPRENORPHINE UR-MCNC: <2 NG/ML

## 2024-08-19 LAB
1OH-MIDAZOLAM UR CFM-MCNC: <25 NG/ML
6MAM UR CFM-MCNC: <25 NG/ML
7AMINOCLONAZEPAM UR CFM-MCNC: <25 NG/ML
A-OH ALPRAZ UR CFM-MCNC: <25 NG/ML
ALPRAZ UR CFM-MCNC: <25 NG/ML
CHLORDIAZEP UR CFM-MCNC: <25 NG/ML
CLONAZEPAM UR CFM-MCNC: <25 NG/ML
CODEINE UR CFM-MCNC: <50 NG/ML
DIAZEPAM UR CFM-MCNC: <25 NG/ML
EDDP UR CFM-MCNC: <25 NG/ML
FENTANYL UR CFM-MCNC: <2.5 NG/ML
HYDROCODONE CTO UR CFM-MCNC: <25 NG/ML
HYDROMORPHONE UR CFM-MCNC: <25 NG/ML
LORAZEPAM UR CFM-MCNC: <25 NG/ML
METHADONE UR CFM-MCNC: <25 NG/ML
MIDAZOLAM UR CFM-MCNC: <25 NG/ML
MORPHINE UR CFM-MCNC: <50 NG/ML
NORDIAZEPAM UR CFM-MCNC: <25 NG/ML
NORFENTANYL UR CFM-MCNC: <2.5 NG/ML
NORHYDROCODONE UR CFM-MCNC: <25 NG/ML
NOROXYCODONE UR CFM-MCNC: <25 NG/ML
NORTAPENTADOL UR CFM-MCNC: <25 NG/ML
NORTRAMADOL UR-MCNC: <50 NG/ML
OXAZEPAM UR CFM-MCNC: <25 NG/ML
OXYCODONE UR CFM-MCNC: <25 NG/ML
OXYMORPHONE UR CFM-MCNC: <25 NG/ML
TAPENTADOL UR CFM-MCNC: <25 NG/ML
TEMAZEPAM UR CFM-MCNC: <25 NG/ML
TRAMADOL UR CFM-MCNC: <50 NG/ML
ZOLPIDEM UR CFM-MCNC: <25 NG/ML
ZOLPIDEM UR-MCNC: <25 NG/ML

## 2024-08-20 LAB
AMPHET UR-MCNC: 4362 NG/ML
MDA UR-MCNC: <200 NG/ML
MDEA UR-MCNC: <200 NG/ML
MDMA UR-MCNC: <200 NG/ML
METHAMPHET UR-MCNC: <200 NG/ML
PHENTERMINE UR CFM-MCNC: <200 NG/ML

## 2024-09-03 ENCOUNTER — TELEMEDICINE (OUTPATIENT)
Dept: PRIMARY CARE | Facility: CLINIC | Age: 59
End: 2024-09-03
Payer: COMMERCIAL

## 2024-09-03 DIAGNOSIS — R09.81 SINUS CONGESTION: ICD-10-CM

## 2024-09-03 DIAGNOSIS — J01.90 ACUTE NON-RECURRENT SINUSITIS, UNSPECIFIED LOCATION: Primary | ICD-10-CM

## 2024-09-03 PROCEDURE — 3048F LDL-C <100 MG/DL: CPT | Performed by: STUDENT IN AN ORGANIZED HEALTH CARE EDUCATION/TRAINING PROGRAM

## 2024-09-03 PROCEDURE — 3060F POS MICROALBUMINURIA REV: CPT | Performed by: STUDENT IN AN ORGANIZED HEALTH CARE EDUCATION/TRAINING PROGRAM

## 2024-09-03 PROCEDURE — 3044F HG A1C LEVEL LT 7.0%: CPT | Performed by: STUDENT IN AN ORGANIZED HEALTH CARE EDUCATION/TRAINING PROGRAM

## 2024-09-03 PROCEDURE — 99214 OFFICE O/P EST MOD 30 MIN: CPT | Performed by: STUDENT IN AN ORGANIZED HEALTH CARE EDUCATION/TRAINING PROGRAM

## 2024-09-03 RX ORDER — AMOXICILLIN AND CLAVULANATE POTASSIUM 875; 125 MG/1; MG/1
875 TABLET, FILM COATED ORAL 2 TIMES DAILY
Qty: 20 TABLET | Refills: 0 | Status: SHIPPED | OUTPATIENT
Start: 2024-09-03 | End: 2024-09-13

## 2024-09-03 RX ORDER — TRIAMCINOLONE ACETONIDE 1 MG/G
CREAM TOPICAL
COMMUNITY
Start: 2024-07-31

## 2024-09-03 NOTE — PROGRESS NOTES
Subjective   Patient ID: Paola Manley is a 59 y.o. female who presents for URI.  Today she is accompanied by alone.     HPI  Sinusitis   Patient presents with fatigue since last week   She started feeling pain under her eyes, on her forehead   Has been taking mucinex D, Astelin and Flonase sprays, tylenol which has not helped at all   The most pain is under her eyes and her ear   Has a history of sinus infections     Seeking advice/treatment recommendations    Current Outpatient Medications on File Prior to Visit   Medication Sig Dispense Refill    triamcinolone (Kenalog) 0.1 % cream APPLY TO LEGS TWO (2) TIMES A DAY AS NEEDED      azelastine (Astelin) 137 mcg (0.1 %) nasal spray Administer 1 spray into each nostril 2 times a day. 30 mL 3    buPROPion XL (Wellbutrin XL) 150 mg 24 hr tablet Take 1 tablet (150 mg) by mouth once daily in the morning. Take before meals.      cloNIDine (Catapres) 0.2 mg tablet TAKE ONE (1) TABLET AT BEDTIME      cyanocobalamin (Vitamin B-12) 1,000 mcg/mL injection Inject 1 mL (1,000 mcg) into the muscle every 30 (thirty) days. 1 mL 11    diclofenac sodium (Voltaren) 1 % gel Apply 4.5 inches (4 g) topically 4 times a day as needed (for pain). 100 g 2    doxycycline (Adoxa) 150 mg tablet Take 1 tablet (150 mg) by mouth once daily.      DULoxetine (Cymbalta) 30 mg DR capsule Take 1 capsule (30 mg) by mouth once daily. To be taken in combination with duloxetine 60 mg daily for a total of 90 mg daily 30 capsule 5    DULoxetine (Cymbalta) 60 mg DR capsule Take 1 capsule (60 mg) by mouth once daily. To be taken in combination with duloxetine 30 mg daily for total dose of 90 mg daily 30 capsule 5    fluticasone (Flonase) 50 mcg/actuation nasal spray Administer 1 spray into each nostril 2 times a day. 18.2 g 3    FreeStyle Latisha 2 Sensor kit Use as instructed 1 each 0    levothyroxine (Synthroid, Levoxyl) 100 mcg tablet Please take by mouth every other day in an alternating fashion opposite of  the 88 mcg 90 tablet 0    levothyroxine (Synthroid, Levoxyl) 88 mcg tablet Please take by mouth every other day in an alternating fashion opposite of the 100 mcg 90 tablet 0    linaCLOtide (Linzess) 290 mcg capsule Take 1 capsule (290 mcg) by mouth once daily in the morning. Take before meals. Do not crush or chew. 90 capsule 1    linaCLOtide (Linzess) 290 mcg capsule Take 1 capsule (290 mcg) by mouth once daily in the morning. Take before meals. DO NOT CRUSH OR CHEW 30 capsule 4    melatonin 10 mg capsule TAKE 1 CAPSULE Bedtime PRN      metaxalone (Skelaxin) 800 mg tablet TAKE ONE-HALF (1/2) TABLET TWO (2) TIMES A DAY AS NEEDED FOR MUSCLE SPASM 30 tablet 5    nicotine (Nicoderm CQ) 14 mg/24 hr patch Place 1 patch over 24 hours on the skin once every 24 hours. (Patient not taking: Reported on 6/6/2024) 30 patch 2    pantoprazole (ProtoNix) 40 mg EC tablet Take 1 tablet (40 mg) by mouth once daily. 90 tablet 1    pregabalin (Lyrica) 200 mg capsule Take 1 capsule (200 mg) by mouth 3 times a day. 90 capsule 4    propranolol (Inderal) 10 mg tablet Take 1 tablet (10 mg) by mouth 3 times a day as needed.      rosuvastatin (Crestor) 10 mg tablet Take 1 tablet (10 mg) by mouth once daily. 90 tablet 1    tirzepatide (Mounjaro) 15 mg/0.5 mL pen injector Inject 15 mg under the skin 1 (one) time per week. Inject 15 mg subcutaneously every week 6 mL 1    Vyvanse 20 mg capsule Take 1 capsule (20 mg) by mouth once daily. Take in the morning       No current facility-administered medications on file prior to visit.        Allergies   Allergen Reactions    Bactrim [Sulfamethoxazole-Trimethoprim] GI Upset    Cephalexin GI Upset     GASTROINTESTIAL IRRITATION    Ciprofloxacin GI Upset     Reaction from Community: Other(See Desc)    Darvocet A500 [Propoxyphene N-Acetaminophen] Itching    Nsaids (Non-Steroidal Anti-Inflammatory Drug) Other     Hx of ulcerative colitis    Vicodin [Hydrocodone-Acetaminophen] Itching       Immunization  History   Administered Date(s) Administered    Flu vaccine (IIV4), preservative free *Check age/dose* 10/11/2017, 12/20/2018, 09/28/2020, 11/24/2021, 12/29/2023    Flu vaccine, trivalent, preservative free, age 6 months and greater (Fluarix/Fluzone/Flulaval) 11/12/2013    Hib (PRP-D) 12/21/2012    Influenza, Unspecified 11/24/2021    Influenza, seasonal, injectable 01/07/2015, 10/06/2015    Meningococcal MCV4, Unspecified 12/21/2012    Meningococcal, Unknown Serogroups 12/21/2012    Moderna COVID-19 vaccine, Fall 2023, 12 yeasrs and older (50mcg/0.5mL) 12/29/2023    Moderna SARS-CoV-2 Vaccination 04/08/2021, 05/06/2021, 01/17/2022    Pneumococcal Conjugate PCV 7 12/21/2012    Pneumococcal conjugate vaccine, 20-valent (PREVNAR 20) 12/15/2023    Tdap vaccine, age 7 year and older (BOOSTRIX, ADACEL) 11/11/2013, 12/15/2023    Zoster vaccine, recombinant, adult (SHINGRIX) 01/17/2022, 01/24/2023         Review of Systems  All pertinent positive symptoms are included in the history of present illness.  All other systems have been reviewed and are negative and noncontributory to this patient's current ailments.     Objective   There were no vitals taken for this visit.  BSA: There is no height or weight on file to calculate BSA.  Office Visit on 08/14/2024   Component Date Value Ref Range Status    BUPRENORPHINE GLUC, URINE 08/14/2024 <5  ng/mL Final    BUPRENORPHINE ,URINE 08/14/2024 <2  ng/mL Final    Comment: INTERPRETIVE INFORMATION: Buprenorphine and                             Metabolites, Urine,                             Quantitative    Methodology: Quantitative Liquid Chromatography-Tandem Mass   Spectrometry    Positive cutoff:   Buprenorphine                 2 ng/mL  Norbuprenorphine                 2 ng/mL  Buprenorphine glucuronide         5 ng/mL  Norbuprenorphine glucuronide      5 ng/mL  Naloxone                       100 ng/mL    For medical purposes only; not valid for forensic use.    The presence of  metabolite(s) without parent drug is common and   may indicate use of parent drug during the prior week. Naloxone is   included to detect addition of a naloxone-containing drug directly   into the urine.      The absence of expected drug(s) and/or drug metabolite(s) may   indicate non-compliance, inappropriate timing of specimen   collection relative to drug administration, poor drug absorption,   diluted/adulterated urine, or limitations of testing. The   concentration value                            must be greater than or equal to the cutoff to   be reported as positive. Interpretive questions should be directed   to the laboratory.    This test was developed and its performance characteristics   determined by [facility]. It has not been cleared or   approved by the US Food and Drug Administration. This test was   performed in a CLIA certified laboratory and is intended for   clinical purposes.    NALOXONE, URINE 08/14/2024 <100  ng/mL Final    Performed By: [facility]  [address] [place] [id number]  : Curtis Watkins MD, PhD  CLIA Number: [id number]    NORBUPRENORPHINE GLUC,URINE 08/14/2024 <5  ng/mL Final    NORBUPRENORPHINE, URINE 08/14/2024 <2  ng/mL Final    Tapentadol 08/14/2024 <25  <25 ng/mL Final    N-Desmethyltapentadol 08/14/2024 <25  <25 ng/mL Final    Alcohol, Urine 08/14/2024 <10  <20 mg/dL Final    Creatinine, Urine Random 08/14/2024 169.2  20.0 - 320.0 mg/dL Final    A urine creatinine result >= 20 mg/dL is considered valid without suspicion of dilution.  Samples with results below this range will automatically reflex to specific  gravity testing to verify specimen integrity.    Amphetamine Screen, Urine 08/14/2024 Presumptive Positive (A)  Presumptive Negative Final    CUTOFF LEVEL: 500 NG/ML   Cross-reactivity has been reported with high concentrations   of the following drugs: buproprion, chloroquine, chlorpromazine,   ephedrine, mephentermine,  fenfluramine, phentermine,   phenylpropanolamine, pseudoephedrine, and propranolol.    Barbiturate Screen, Urine 08/14/2024 Presumptive Negative  Presumptive Negative Final    CUTOFF LEVEL: 200 NG/ML    Cannabinoid Screen, Urine 08/14/2024 Presumptive Negative  Presumptive Negative Final    CUTOFF LEVEL: 50 NG/ML    Cocaine Metabolite Screen, Urine 08/14/2024 Presumptive Negative  Presumptive Negative Final    CUTOFF LEVEL: 150 NG/ML    PCP Screen, Urine 08/14/2024 Presumptive Negative  Presumptive Negative Final    CUTOFF LEVEL:  25 NG/ML  Cross-reactivity has been reported with dextromethorphan.    Clonazepam 08/14/2024 <25  <25 ng/mL Final    7-Aminoclonazepam 08/14/2024 <25  <25 ng/mL Final    Alprazolam 08/14/2024 <25  <25 ng/mL Final    Alpha-Hydroxyalprazolam 08/14/2024 <25  <25 ng/mL Final    Midazolam 08/14/2024 <25  <25 ng/mL Final    Alpha-Hydroxymidazolam 08/14/2024 <25  <25 ng/mL Final    Chlordiazepoxide 08/14/2024 <25  <25 ng/mL Final    Diazepam 08/14/2024 <25  <25 ng/mL Final    Nordiazepam 08/14/2024 <25  <25 ng/mL Final    Temazepam 08/14/2024 <25  <25 ng/mL Final    Oxazepam 08/14/2024 <25  <25 ng/mL Final    Lorazepam 08/14/2024 <25  <25 ng/mL Final    Methadone 08/14/2024 <25  <25 ng/mL Final    EDDP 08/14/2024 <25  <25 ng/mL Final    6-Acetylmorphine 08/14/2024 <25  <25 ng/mL Final    Codeine 08/14/2024 <50  <50 ng/mL Final    Hydrocodone 08/14/2024 <25  <25 ng/mL Final    Hydromorphone 08/14/2024 <25  <25 ng/mL Final    Morphine  08/14/2024 <50  <50 ng/mL Final    Norhydrocodone 08/14/2024 <25  <25 ng/mL Final    Noroxycodone 08/14/2024 <25  <25 ng/mL Final    Oxycodone 08/14/2024 <25  <25 ng/mL Final    Oxymorphone 08/14/2024 <25  <25 ng/mL Final    Fentanyl 08/14/2024 <2.5  <2.5 ng/mL Final    Norfentanyl 08/14/2024 <2.5  <2.5 ng/mL Final    Tramadol 08/14/2024 <50  <50 ng/mL Final    O-Desmethyltramadol 08/14/2024 <50  <50 ng/mL Final    Zolpidem 08/14/2024 <25  <25 ng/mL Final    Zolpidem  Metabolite (ZCA) 08/14/2024 <25  <25 ng/mL Final    Methamphetamine Quant, Ur 08/14/2024 <200  ng/mL Final    MDA, Urine 08/14/2024 <200  ng/mL Final    MDEA, Urine 08/14/2024 <200  ng/mL Final    Phentermine,Urine 08/14/2024 <200  ng/mL Final    Performed By: [email or web address]  [address] [place] [id number]  : Curtis Watkins MD, PhD  CLIA Number: [id number]    Amphetamines,Urine 08/14/2024 4362  ng/mL Final    INTERPRETIVE INFORMATION: Amphetamines, Urine,                             Quantitative    Methodology: Quantitative Liquid Chromatography-Tandem Mass   Spectrometry    Positive cutoff: 200 ng/mL unless specified below:  Amphetamine     50 ng/mL    For medical purposes only; not valid for forensic use.     The absence of expected drug(s) and/or drug metabolite(s) may   indicate non-compliance, inappropriate timing of specimen   collection relative to drug administration, poor drug absorption,   diluted/adulterated urine, or limitations of testing. The   concentration value must be greater than or equal to the cutoff to   be reported as positive. Interpretive questions should be directed   to the laboratory.    This test was developed and its performance characteristics   determined by [email or web address]. It has not been cleared or   approved by the US Food and Drug Administration. This test was   performed in a CLIA certified laboratory and is intended for   clinical purposes.    MDMA, Urine 08/14/2024 <200  ng/mL Final       Physical Exam  CONSTITUTIONAL - well nourished, well developed, looks like stated age, in no acute distress, tired appearing   SKIN - normal skin color and pigmentation, normal skin turgor without rash, lesions, or nodules visualized  HEAD - no trauma, normocephalic  EYES - normal external exam  CHEST -no distressed breathing, good effort  EXTREMITIES - no edema, no deformities  NEUROLOGICAL - normal balance, normal motor, no ataxia  PSYCHIATRIC -  alert, pleasant and cordial, age-appropriate       Assessment/Plan   Sinusitis   We sent in a prescription for Augmentin to your pharmacy to help with your signs/symptoms     Risks, benefits, and options of treatment(s) were discussed after reviewing all current medication(s) and drug allergy(ies)  I opted for the treatment that we discussed with instructions on the medication use for your underlying medical ailment(s)  I encouraged supportive care such as rest, fluids and Advil/Tylenol as warranted  Return to the clinic in 7-10 days or sooner if symptoms worsen or persist as we will then further evaluate

## 2024-09-05 ENCOUNTER — PHARMACY VISIT (OUTPATIENT)
Dept: PHARMACY | Facility: CLINIC | Age: 59
End: 2024-09-05
Payer: COMMERCIAL

## 2024-09-05 PROCEDURE — RXMED WILLOW AMBULATORY MEDICATION CHARGE

## 2024-09-19 ENCOUNTER — PHARMACY VISIT (OUTPATIENT)
Dept: PHARMACY | Facility: CLINIC | Age: 59
End: 2024-09-19

## 2024-09-24 ENCOUNTER — APPOINTMENT (OUTPATIENT)
Dept: PRIMARY CARE | Facility: CLINIC | Age: 59
End: 2024-09-24
Payer: COMMERCIAL

## 2024-10-08 ENCOUNTER — HOSPITAL ENCOUNTER (OUTPATIENT)
Dept: RADIOLOGY | Facility: HOSPITAL | Age: 59
Discharge: HOME | End: 2024-10-08
Payer: COMMERCIAL

## 2024-10-08 ENCOUNTER — APPOINTMENT (OUTPATIENT)
Dept: ORTHOPEDIC SURGERY | Facility: CLINIC | Age: 59
End: 2024-10-08
Payer: COMMERCIAL

## 2024-10-08 DIAGNOSIS — M17.12 ARTHRITIS OF LEFT KNEE: ICD-10-CM

## 2024-10-08 DIAGNOSIS — M25.561 ACUTE PAIN OF RIGHT KNEE: ICD-10-CM

## 2024-10-08 DIAGNOSIS — M25.561 ACUTE PAIN OF RIGHT KNEE: Primary | ICD-10-CM

## 2024-10-08 DIAGNOSIS — M17.11 ARTHRITIS OF RIGHT KNEE: ICD-10-CM

## 2024-10-08 PROCEDURE — 73564 X-RAY EXAM KNEE 4 OR MORE: CPT | Mod: RT

## 2024-10-08 PROCEDURE — 20610 DRAIN/INJ JOINT/BURSA W/O US: CPT

## 2024-10-08 PROCEDURE — 99214 OFFICE O/P EST MOD 30 MIN: CPT

## 2024-10-08 PROCEDURE — 73564 X-RAY EXAM KNEE 4 OR MORE: CPT | Mod: RIGHT SIDE | Performed by: STUDENT IN AN ORGANIZED HEALTH CARE EDUCATION/TRAINING PROGRAM

## 2024-10-08 PROCEDURE — 3048F LDL-C <100 MG/DL: CPT

## 2024-10-08 PROCEDURE — 3044F HG A1C LEVEL LT 7.0%: CPT

## 2024-10-08 PROCEDURE — 3060F POS MICROALBUMINURIA REV: CPT

## 2024-10-08 RX ORDER — TRIAMCINOLONE ACETONIDE 40 MG/ML
2.5 INJECTION, SUSPENSION INTRA-ARTICULAR; INTRAMUSCULAR
Status: COMPLETED | OUTPATIENT
Start: 2024-10-08 | End: 2024-10-08

## 2024-10-08 RX ORDER — HYALURONATE SODIUM, STABILIZED 60 MG/3 ML
60 SYRINGE (ML) INTRAARTICULAR ONCE
Qty: 3 ML | Refills: 0 | Status: SHIPPED | OUTPATIENT
Start: 2024-10-08 | End: 2024-10-08

## 2024-10-08 ASSESSMENT — PAIN SCALES - GENERAL: PAINLEVEL_OUTOF10: 5 - MODERATE PAIN

## 2024-10-08 ASSESSMENT — PAIN - FUNCTIONAL ASSESSMENT: PAIN_FUNCTIONAL_ASSESSMENT: 0-10

## 2024-10-08 NOTE — PROGRESS NOTES
This is a consultation from Dr. Addy Storm DO for   Chief Complaint   Patient presents with    Right Knee - Pain       This is a 59 y.o. female who presents for evaluation of bilateral knee pain.  Patient states that she had a gel injection in her left knee a few months ago and it has been working very well.  She states that is noticing the pain is slowly coming back.  She also had a recent exacerbation of right knee pain where she stepped wrong and caused a lot of pain.  It has been a long time since she had steroid injection in her right side.  She complains of medial sided pain in her right knee and lateral sided pain in her left knee.  Patient denies any numbness or tingling or distal extremities.  Patient would like to try gel injection for her right knee.  She is set up for December for gel injections of the left.    Physical Exam    There has been no interval change in this patient's past medical, surgical, medications, allergies, family history or social history since the most recent visit to a provider within our department. 14 point review of systems was performed, reviewed, and negative except for pertinent positives documented in the history of present illness.     Constitutional: well developed, well nourished female in no acute distress  Psychiatric: normal mood, appropriate affect  Eyes: sclera anicteric  HENT: normocephalic/atraumatic  CV: regular rate and rhythm   Respiratory: non labored breathing  Integumentary: no rash  Neurological: moves all extremities    Right knee exam: skin intact no lacerations or abrations. no effusion.  Tender medial joint line. negative log roll negative patellar grind. ROM 0-120. stable to varus and valgus stress at 0 and 30 degrees. negative lachman negative posterior drawer negative esha. 5/5 ehl/fhl/gs/ta. silt s/s/sp/dp/t. 2+ dp/pt    Left knee exam: skin intact no lacerations or abrations. no effusion.  Tender lateral joint line. negative log roll  negative patellar grind. ROM 0-120. stable to varus and valgus stress at 0 and 30 degrees. negative lachman negative posterior drawer negative esha. 5/5 ehl/fhl/gs/ta. silt s/s/sp/dp/t. 2+ dp/pt      Xrays were ordered by me, they were reviewed and independently interpreted by me today, they show moderate to severe joint space degeneration especially in the medial compartment of the right knee and the lateral compartment of the left knee.  No presence of acute fracture or dislocation.    L Inj/Asp: bilateral knee on 10/8/2024 9:53 AM  Indications: pain and joint swelling  Details: 22 G needle, anterolateral approach  Medications (Right): 2.5 mg triamcinolone acetonide 40 mg/mL  Medications (Left): 2.5 mg triamcinolone acetonide 40 mg/mL    Discussion:  I discussed the conservative treatment options for knee osteoarthritis including but not limited to physical therapy, oral NSAIDS, activity and lifestyle modification, and corticosteroid injections. Pt has elected to undergo a cortisone injection today. I have explained the risk and benefits of an injection including the possibility of joint infection, bleeding, damage to cartilage, allergic reaction. Patient verbalized understanding and gave verbal consent wishes to proceed with a intra-articular cortisone injection for their knee.    Procedure:  After discussing the risk and benefits of the procedure, we proceeded with an intra-articular bilateral knee injection. We discussed the risks and benefits and potential morbidity related to the treatment, and to the prescription medication administered in the injection    With the patient's informed verbal consent, the bilateral knees were prepped in standard sterile fashion with Chlorhexidine. The skin was then anesthetized with ethyl chloride spray and cleaned again with Chlorhexidine. The bilateral knees were then apirated/injected with a prefilled 20-gauge syringe of 40 mg Kenalog + 4 ml Lidocaine using the lateral  approach without complications.  The patient tolerated this well and felt immediate initial relief of symptoms. A bandaid was applied and the patient ambulated out of the clinic on ther own accord without difficulty. Patient was instructed to avoid physical activity for 24-48 hours to prevent the knees from swelling and may ice the knees as tolerated. Patient should contact the office if any signs of of infection appear: redness, fever, chills, drainage, swelling or warmth to the knees.  Pt understands that the injections can be repeated no sooner than 3 months.      Procedure, treatment alternatives, risks and benefits explained, specific risks discussed. Consent was given by the patient. Immediately prior to procedure a time out was called to verify the correct patient, procedure, equipment, support staff and site/side marked as required. Patient was prepped and draped in the usual sterile fashion.             Impression/Plan: This is a 59 y.o. female with recent exacerbations of pain in her right and left knee.  I had an in depth discussion with the patient regarding treatment options for arthritis and their relative risks and benefits. We reviewed surgical and nonsurgical option for treatment. Treatments include anti inflammatory medications, physical therapy, weight loss, activity modification, use of assistive devices, injection therapies. We discussed current prescriptions and risks and benefits of continuation of prescription medication as apporpriate. We discussed that arthritis is often progressive over time, an in end stage arthritis surgical interventions can be considered, including arthroplasty. All questions were answered and the patient voiced their understanding.  We have done bilateral steroid injections today in her knees and I have placed an order for a gel injection in her right knee.  I stated that once we get the injection for her right knee she can decide if she would like to get them done at  "that time or if she would like to wait until she gets her left knee done.  She will determine that.    BMI Readings from Last 1 Encounters:   08/14/24 31.93 kg/m²      Lab Results   Component Value Date    CREATININE 0.82 06/05/2024     Tobacco Use: High Risk (10/8/2024)    Patient History     Smoking Tobacco Use: Every Day     Smokeless Tobacco Use: Never     Passive Exposure: Not on file      Computed MELD 3.0 unavailable. One or more values for this score either were not found within the given timeframe or did not fit some other criterion.  Computed MELD-Na unavailable. One or more values for this score either were not found within the given timeframe or did not fit some other criterion.       Lab Results   Component Value Date    HGBA1C 5.8 (H) 06/05/2024     No results found for: \"STAPHMRSASCR\"  "

## 2024-10-10 ENCOUNTER — APPOINTMENT (OUTPATIENT)
Dept: PRIMARY CARE | Facility: CLINIC | Age: 59
End: 2024-10-10
Payer: COMMERCIAL

## 2024-10-10 PROCEDURE — RXMED WILLOW AMBULATORY MEDICATION CHARGE

## 2024-10-12 ENCOUNTER — PHARMACY VISIT (OUTPATIENT)
Dept: PHARMACY | Facility: CLINIC | Age: 59
End: 2024-10-12
Payer: COMMERCIAL

## 2024-11-04 ENCOUNTER — PHARMACY VISIT (OUTPATIENT)
Dept: PHARMACY | Facility: CLINIC | Age: 59
End: 2024-11-04
Payer: COMMERCIAL

## 2024-11-04 PROCEDURE — RXMED WILLOW AMBULATORY MEDICATION CHARGE

## 2024-11-29 DIAGNOSIS — E11.9 TYPE 2 DIABETES MELLITUS WITHOUT COMPLICATION, WITHOUT LONG-TERM CURRENT USE OF INSULIN (MULTI): ICD-10-CM

## 2024-11-29 PROCEDURE — RXMED WILLOW AMBULATORY MEDICATION CHARGE

## 2024-12-02 RX ORDER — TIRZEPATIDE 15 MG/.5ML
15 INJECTION, SOLUTION SUBCUTANEOUS
Qty: 6 ML | Refills: 1 | OUTPATIENT
Start: 2024-12-08

## 2024-12-03 DIAGNOSIS — E11.9 TYPE 2 DIABETES MELLITUS WITHOUT COMPLICATION, WITHOUT LONG-TERM CURRENT USE OF INSULIN (MULTI): ICD-10-CM

## 2024-12-04 DIAGNOSIS — E11.9 TYPE 2 DIABETES MELLITUS WITHOUT COMPLICATION, WITHOUT LONG-TERM CURRENT USE OF INSULIN (MULTI): ICD-10-CM

## 2024-12-04 RX ORDER — TIRZEPATIDE 15 MG/.5ML
15 INJECTION, SOLUTION SUBCUTANEOUS
Qty: 2 ML | Refills: 0 | Status: CANCELLED | OUTPATIENT
Start: 2024-12-08

## 2024-12-05 PROCEDURE — RXMED WILLOW AMBULATORY MEDICATION CHARGE

## 2024-12-05 RX ORDER — TIRZEPATIDE 15 MG/.5ML
15 INJECTION, SOLUTION SUBCUTANEOUS
Qty: 2 ML | Refills: 0 | Status: SHIPPED | OUTPATIENT
Start: 2024-12-08

## 2024-12-11 ENCOUNTER — APPOINTMENT (OUTPATIENT)
Dept: ORTHOPEDIC SURGERY | Facility: CLINIC | Age: 59
End: 2024-12-11
Payer: COMMERCIAL

## 2024-12-11 ENCOUNTER — HOSPITAL ENCOUNTER (OUTPATIENT)
Dept: RADIOLOGY | Facility: HOSPITAL | Age: 59
Discharge: HOME | End: 2024-12-11
Payer: COMMERCIAL

## 2024-12-11 VITALS — WEIGHT: 210 LBS | HEIGHT: 68 IN | BODY MASS INDEX: 31.83 KG/M2

## 2024-12-11 DIAGNOSIS — M75.41 IMPINGEMENT SYNDROME OF RIGHT SHOULDER: ICD-10-CM

## 2024-12-11 DIAGNOSIS — M25.511 RIGHT SHOULDER PAIN, UNSPECIFIED CHRONICITY: ICD-10-CM

## 2024-12-11 DIAGNOSIS — M54.12 CERVICAL RADICULAR PAIN: ICD-10-CM

## 2024-12-11 DIAGNOSIS — M75.101 NONTRAUMATIC TEAR OF RIGHT ROTATOR CUFF, UNSPECIFIED TEAR EXTENT: Primary | ICD-10-CM

## 2024-12-11 DIAGNOSIS — M75.21 BICEPS TENDINITIS OF RIGHT SHOULDER: ICD-10-CM

## 2024-12-11 DIAGNOSIS — M75.121 COMPLETE TEAR OF RIGHT ROTATOR CUFF, UNSPECIFIED WHETHER TRAUMATIC: ICD-10-CM

## 2024-12-11 DIAGNOSIS — S43.431A LABRAL TEAR OF SHOULDER, RIGHT, INITIAL ENCOUNTER: ICD-10-CM

## 2024-12-11 DIAGNOSIS — M75.111 INCOMPLETE TEAR OF RIGHT ROTATOR CUFF, UNSPECIFIED WHETHER TRAUMATIC: ICD-10-CM

## 2024-12-11 PROCEDURE — 3044F HG A1C LEVEL LT 7.0%: CPT | Performed by: ORTHOPAEDIC SURGERY

## 2024-12-11 PROCEDURE — 3048F LDL-C <100 MG/DL: CPT | Performed by: ORTHOPAEDIC SURGERY

## 2024-12-11 PROCEDURE — 3008F BODY MASS INDEX DOCD: CPT | Performed by: ORTHOPAEDIC SURGERY

## 2024-12-11 PROCEDURE — 73030 X-RAY EXAM OF SHOULDER: CPT | Mod: RIGHT SIDE | Performed by: RADIOLOGY

## 2024-12-11 PROCEDURE — 73030 X-RAY EXAM OF SHOULDER: CPT | Mod: RT

## 2024-12-11 PROCEDURE — 3060F POS MICROALBUMINURIA REV: CPT | Performed by: ORTHOPAEDIC SURGERY

## 2024-12-11 PROCEDURE — 20610 DRAIN/INJ JOINT/BURSA W/O US: CPT | Performed by: ORTHOPAEDIC SURGERY

## 2024-12-11 PROCEDURE — 99214 OFFICE O/P EST MOD 30 MIN: CPT | Performed by: ORTHOPAEDIC SURGERY

## 2024-12-11 RX ORDER — TRIAMCINOLONE ACETONIDE 40 MG/ML
40 INJECTION, SUSPENSION INTRA-ARTICULAR; INTRAMUSCULAR
Status: COMPLETED | OUTPATIENT
Start: 2024-12-11 | End: 2024-12-11

## 2024-12-11 RX ORDER — LIDOCAINE HYDROCHLORIDE 5 MG/ML
4 INJECTION, SOLUTION INFILTRATION; PERINEURAL
Status: COMPLETED | OUTPATIENT
Start: 2024-12-11 | End: 2024-12-11

## 2024-12-11 RX ORDER — DICLOFENAC SODIUM 10 MG/G
4 GEL TOPICAL 4 TIMES DAILY
Qty: 100 G | Refills: 1 | Status: SHIPPED | OUTPATIENT
Start: 2024-12-11

## 2024-12-11 ASSESSMENT — PAIN - FUNCTIONAL ASSESSMENT: PAIN_FUNCTIONAL_ASSESSMENT: 0-10

## 2024-12-11 ASSESSMENT — PAIN SCALES - GENERAL: PAINLEVEL_OUTOF10: 5 - MODERATE PAIN

## 2024-12-11 NOTE — PROGRESS NOTES
59-year-old female complains of 2 weeks of right lateral shoulder pain.  Patient stated she woke up with neck pain radiating the shoulder and since then the right shoulder pains been getting worse.  She is try treating with ice heat and TENS unit.  States is worse with range of motion better with rest.  Patient states her arm is tingling and she also does have neck pain    Patients' self reported past medical history, medications, allergies, surgical history, family and social history as well as a 10 point review of systems has been documented in the new patient intake form and scanned into the patient's electronic medical record.  The intake form was reviewed by Dr Canela during the office visit and signed by Dr. Canela and the patient.  Pertinent findings are documented in the HPI.    General Multi-System Physical Exam:  Constitutional  General appearance:  Alert, oriented, and in no acute distress.  Well developed, well nourished.  Head and Face  Head and face:  Normocephalic and atraumatic.  Ears, Nose, Mouth, and Throat  External inspection of ears and nose: Normal.  Eyes:  Pupils are equal and round.  Neck  Neck:  no neck mass was observed.  Pulmonary  Respiratory effort:  no respiratory distress.  Cardiovascular  Intact distal pulses.  Lymphatic  Palpation of lymph nodes in the affected extremity:  Normal.  Skin  Skin and subcutaneous tissue:  Normal skin color and pigmentation.  Normal skin turgor.  No rashes.  Neurologic  Sensation:  normal to light touch.  Psychiatric  Judgement and insight:  Intact.  Mood and affect:  Normal.  Musculoskeletal  Right shoulder is 150 degrees of abduction forward flexion 70 degrees of external rotation internal rotates to L1 she has positive acromioclavicular joint tenderness with positive crossarm test positive biceps tenderness positive Neer positive Matute positive Jobes with pain and weakness positive Spurling's test neurovasc intact right upper extremity    X-rays of  "the patient were ordered by Dr Canela and obtained today.  Dr Canela personally reviewed the results of the x-rays.    In addition, Dr Canela independently interpreted the patient's x-rays (performed by the Radiology department) by viewing the x-ray images and this is Dr. Canela's personal interpretation:     Normal x-rays right shoulder    We discussed the fact that some of the symptoms are most likely coming from the neck but some the symptoms are also coming from the shoulder.  We will refer her to physical medicine and rehabilitation in consultation to workup her neck pain.  We gave her prescription for Voltaren gel since she has ulcerative colitis and a right shoulder steroid injection and a prescription for physical therapy for her right shoulder and neck.     We had a long discussion in regards to the patient's shoulder pain.  The differential diagnosis of the patient's shoulder pain include: shoulder impingement, rotator cuff tendinopathy, rotator cuff tearing, and biceps tendinopathy as all being potential sources of the pain.  There are numerous non-operative and operative treatment options for each of these conditions.     We will start off by treating the patient's shoulder conservatively (AKA non-operatively).  We gave the patient a prescription for physical therapy to work on increasing the range of motion and strength in the shoulder.   We also gave the patient a \"home exercise protocol\" list of exercises that they could work on to strengthen their shoulder at home.  We also called in a prescription for anti-inflammatories for the patient.  The patient was informed that there are rare risks of using nonsteroidal antiinflammatory (NSAID) medications.  Risks of NSAIDS include, but are not limited to, upset stomach, ulcers in the stomach and other places in the gastrointestinal tract, and a mild increase in cardiovascular risk as a result of the antiinflammatory medications.  In addition, there is an " increased risk in bleeding as a result of the medications.  The patient was advised to stop taking the NSAIDs if they cause them to have an upset stomach.  The patient was instructed to take the medication on a p.r.n. basis as needed only.  NSAIDs are not supposed to be taken every day for more than a few weeks.  If they have any questions or problems with the antiinflammatory medications, they should stop taking the medication immediately and call the office.    We offered the patient an injection of steroids into their shoulder.  I explained to the patient that there are some rare risks of steroid injections.      Rare risks of steroid injections into the shoulder include pain at the site of the injection, local swelling, irritation from the injection or the skin spray, local discoloration of the skin, risk of bleeding, and a risk of shoulder infection.  If the patient does have a flareup of pain in the evening following the injection, they should ice the shoulder 15 minutes at a time 3 times a day for up to 3 days.  If the pain gets too severe, they should go to a local Emergency Room right away.      The patient decided to proceed with the injection.  After sterilely prepping the posterior aspect of the shoulder joint with Betadyne, 5 mL of 0.25% Marcaine and 1 mL of Kenalog 40 mg were injected into the subacromial bursa from a posterior approach.  There were no complications.  A bandage was applied over the site of the injection. The patient tolerated the procedure well.    We will see the patient back in 6-8 weeks to reevaluate their shoulder pain. If they are still having pain at that time, we would then need to order a MRI to look for possible rotator cuff tears or biceps tearing in the shoulder.  The patient should call the office during business hours (9am-3pm; Monday - Friday)  with any questions or problems.  If the patient has any urgent issues outside of business hours, they should go to a local  Emergency Room.        This patient has a new, acute, previously-undiagnosed problem of their affected extremity.  We will begin treatment as listed here and monitor treatment based upon their progression and response to treatment.  Due to the fact that we are just beginning treatment on this issue, this is currently considered an undiagnosed new problem with uncertain prognosis.  The exact diagnosis and their prognosis will depend upon their response to treatment and progression of their condition as time progresses.    Due to this patient's condition, they are at a moderate risk of morbidity from additional diagnostic testing / treatment.      To help them with their pain, I wrote them a prescription for prescription strength anti-inflammatories.  The patient was informed that there are risks of using nonsteroidal antiinflammatory (NSAID) medications.    Risks of NSAIDS include, but are not limited to, upset stomach, ulcers in the stomach and other places in the gastrointestinal tract, and a mild increase in cardiovascular risk as a result of the antiinflammatory medications.  In addition, there is an increased risk in bleeding as a result of the medications.    The patient was advised to stop taking the NSAIDs if they cause them to have an upset stomach.  NSAIDs are not supposed to be taken every day for more than a few weeks.  If they have any questions or problems with the antiinflammatory medications, they should stop taking the medication immediately and call the office.      Patient ID: Paola Manley is a 59 y.o. female.    L Inj/Asp: R subacromial bursa on 12/11/2024 3:47 PM  Indications: pain  Details: 22 G needle, posterior approach  Medications: 40 mg triamcinolone acetonide 40 mg/mL; 4 mL lidocaine 5 mg/mL (0.5 %)  Outcome: tolerated well, no immediate complications  Procedure, treatment alternatives, risks and benefits explained, specific risks discussed. Consent was given by the patient. Immediately  prior to procedure a time out was called to verify the correct patient, procedure, equipment, support staff and site/side marked as required. Patient was prepped and draped in the usual sterile fashion.

## 2024-12-13 DIAGNOSIS — M46.1 SACROILIITIS (CMS-HCC): ICD-10-CM

## 2024-12-13 DIAGNOSIS — M54.16 LUMBAR RADICULAR PAIN: ICD-10-CM

## 2024-12-13 DIAGNOSIS — M79.7 FIBROMYALGIA: ICD-10-CM

## 2024-12-14 ENCOUNTER — PHARMACY VISIT (OUTPATIENT)
Dept: PHARMACY | Facility: CLINIC | Age: 59
End: 2024-12-14
Payer: COMMERCIAL

## 2024-12-16 ENCOUNTER — TELEMEDICINE (OUTPATIENT)
Dept: PRIMARY CARE | Facility: CLINIC | Age: 59
End: 2024-12-16
Payer: COMMERCIAL

## 2024-12-16 DIAGNOSIS — J01.90 ACUTE NON-RECURRENT SINUSITIS, UNSPECIFIED LOCATION: Primary | ICD-10-CM

## 2024-12-16 DIAGNOSIS — R09.81 SINUS CONGESTION: ICD-10-CM

## 2024-12-16 PROCEDURE — 3044F HG A1C LEVEL LT 7.0%: CPT | Performed by: STUDENT IN AN ORGANIZED HEALTH CARE EDUCATION/TRAINING PROGRAM

## 2024-12-16 PROCEDURE — 3048F LDL-C <100 MG/DL: CPT | Performed by: STUDENT IN AN ORGANIZED HEALTH CARE EDUCATION/TRAINING PROGRAM

## 2024-12-16 PROCEDURE — 99214 OFFICE O/P EST MOD 30 MIN: CPT | Performed by: STUDENT IN AN ORGANIZED HEALTH CARE EDUCATION/TRAINING PROGRAM

## 2024-12-16 PROCEDURE — 3060F POS MICROALBUMINURIA REV: CPT | Performed by: STUDENT IN AN ORGANIZED HEALTH CARE EDUCATION/TRAINING PROGRAM

## 2024-12-16 RX ORDER — AMOXICILLIN AND CLAVULANATE POTASSIUM 875; 125 MG/1; MG/1
875 TABLET, FILM COATED ORAL 2 TIMES DAILY
Qty: 14 TABLET | Refills: 0 | Status: SHIPPED | OUTPATIENT
Start: 2024-12-16

## 2024-12-16 NOTE — PROGRESS NOTES
"Subjective   Patient ID: Paola Manley is a 59 y.o. female who presents for URI.    HPI   Patient is calling into the office today to discuss signs/symptoms of acute sinusitis    States that last week she felt slightly fatigued and then ultimately by the end of the week she felt very \"sick \"on Friday which was 3 days ago    Ultimately she has been noticing increased sinus congestion, upper teeth pain, hot/cold intolerances, sweats, headache, and the overall feeling of being slightly ill    Denies any fevers that she knows of or any difficulty breathing, shortness of breath, etc.    Due to her symptoms lasting now almost 1 week, she wishes to discuss this further and be treated appropriately    Review of Systems  All pertinent positive symptoms are included in the history of present illness.    All other systems have been reviewed and are negative and noncontributory to this patient's current ailments.    Objective   There were no vitals taken for this visit.    Physical Exam  CONSTITUTIONAL - well nourished, well developed, looks like stated age, in no acute distress, appears slightly fatigued, slightly ill  SKIN - normal skin color and pigmentation, normal skin turgor without rash, lesions, or nodules visualized  HEAD - no trauma, normocephalic  EYES - normal external exam  CHEST -no distressed breathing, good effort  EXTREMITIES - no edema, no deformities  NEUROLOGICAL - normal balance, normal motor, no ataxia  PSYCHIATRIC - alert, pleasant and cordial, age-appropriate    Assessment/Plan   We had a long conversation about your signs/symptoms and ultimately I truly believe that this could be bacterial in nature    I provided Augmentin to be taken twice daily for the next 7 days to take alongside the doxycycline that you already take for rosacea    Risks, benefits, and options of treatment(s) were discussed after reviewing all current medication(s) and drug allergy(ies)  I opted for the treatment that we discussed " with instructions on the medication use for your underlying medical ailment(s)  I encouraged supportive care such as rest, fluids and Advil/Tylenol as warranted  Return to the clinic in 7-10 days or sooner if symptoms worsen or persist as we will then further evaluate    At any point if you notice worsening or acute signs/symptoms please notify me immediately and/or go to nearest emergency room to be acutely value

## 2024-12-16 NOTE — TELEPHONE ENCOUNTER
Pt is requesting refill of  pregabalin 200 mg 1 capsule po TID [facility]                                                         LV:    8/14/24                  NV:  2/13/25                                     Pended RX to CANDICE Gongora for transmission to pharmacy.

## 2024-12-17 ENCOUNTER — APPOINTMENT (OUTPATIENT)
Dept: PRIMARY CARE | Facility: CLINIC | Age: 59
End: 2024-12-17
Payer: COMMERCIAL

## 2024-12-17 RX ORDER — PREGABALIN 200 MG/1
200 CAPSULE ORAL 3 TIMES DAILY
Qty: 90 CAPSULE | Refills: 1 | Status: SHIPPED | OUTPATIENT
Start: 2024-12-17

## 2024-12-23 ENCOUNTER — TELEPHONE (OUTPATIENT)
Dept: ORTHOPEDIC SURGERY | Facility: CLINIC | Age: 59
End: 2024-12-23
Payer: COMMERCIAL

## 2024-12-23 DIAGNOSIS — E11.9 TYPE 2 DIABETES MELLITUS WITHOUT COMPLICATION, WITHOUT LONG-TERM CURRENT USE OF INSULIN (MULTI): ICD-10-CM

## 2024-12-23 RX ORDER — TIRZEPATIDE 15 MG/.5ML
15 INJECTION, SOLUTION SUBCUTANEOUS
Qty: 2 ML | Refills: 0 | Status: SHIPPED | OUTPATIENT
Start: 2024-12-23 | End: 2024-12-24 | Stop reason: SDUPTHER

## 2024-12-23 NOTE — TELEPHONE ENCOUNTER
Patient last seen 12/11/24 Right Shoulder Pain.  She received an injection.    Complaining tingling all the way down the arm and it is weaker than before.]    Is this normal or does she need to come back in to the office.

## 2024-12-24 ENCOUNTER — APPOINTMENT (OUTPATIENT)
Dept: ORTHOPEDIC SURGERY | Facility: CLINIC | Age: 59
End: 2024-12-24
Payer: COMMERCIAL

## 2024-12-24 DIAGNOSIS — E11.9 TYPE 2 DIABETES MELLITUS WITHOUT COMPLICATION, WITHOUT LONG-TERM CURRENT USE OF INSULIN (MULTI): ICD-10-CM

## 2024-12-24 RX ORDER — TIRZEPATIDE 15 MG/.5ML
15 INJECTION, SOLUTION SUBCUTANEOUS
Qty: 2 ML | Refills: 0 | Status: SHIPPED | OUTPATIENT
Start: 2024-12-24

## 2024-12-27 ENCOUNTER — APPOINTMENT (OUTPATIENT)
Dept: ORTHOPEDIC SURGERY | Facility: CLINIC | Age: 59
End: 2024-12-27
Payer: COMMERCIAL

## 2024-12-27 DIAGNOSIS — M17.12 ARTHRITIS OF LEFT KNEE: Primary | ICD-10-CM

## 2024-12-27 DIAGNOSIS — M17.11 ARTHRITIS OF RIGHT KNEE: ICD-10-CM

## 2024-12-27 PROCEDURE — 3060F POS MICROALBUMINURIA REV: CPT

## 2024-12-27 PROCEDURE — 3048F LDL-C <100 MG/DL: CPT

## 2024-12-27 PROCEDURE — 99024 POSTOP FOLLOW-UP VISIT: CPT

## 2024-12-27 PROCEDURE — 3044F HG A1C LEVEL LT 7.0%: CPT

## 2024-12-27 PROCEDURE — 20610 DRAIN/INJ JOINT/BURSA W/O US: CPT

## 2024-12-27 ASSESSMENT — PAIN - FUNCTIONAL ASSESSMENT: PAIN_FUNCTIONAL_ASSESSMENT: 0-10

## 2024-12-27 ASSESSMENT — PAIN SCALES - GENERAL: PAINLEVEL_OUTOF10: 4

## 2024-12-27 NOTE — PROGRESS NOTES
Paola Manley is a 59 y.o. female here for gel injection  Chief Complaint   Patient presents with    Right Knee - Follow-up     Durolane injection     Left Knee - Follow-up     Durolane injection         L Inj/Asp: bilateral knee on 12/27/2024 9:48 AM  Indications: pain and joint swelling  Details: 22 G needle, anterolateral approach  Medications (Right): 60 mg sodium hyaluronate 60 mg/3 mL  Medications (Left): 60 mg sodium hyaluronate 60 mg/3 mL    Discussion:  I discussed the conservative treatment options for knee osteoarthritis including but not limited to physical therapy, oral NSAIDS, activity and lifestyle modification, hyaluronic acid injections and corticosteroid injections. Pt has elected to undergo a hyaluronic acid injection today. I have explained the risk and benefits of an injection including the possibility of joint infection, bleeding, damage to cartilage, allergic reaction. Patient verbalized understanding and gave verbal consent wishes to proceed with a intra-articular hyaluronic acid injection for their knee.    Procedure:  After discussing the risk and benefits of the procedure, we proceeded with an intra-articular bilateral knee injection. We discussed the risks and benefits and potential morbidity related to the treatment, and to the prescription medication administered in the injection    With the patient's informed verbal consent, the bilateral knees were prepped in standard sterile fashion with Chlorhexidine. The skin was then anesthetized with ethyl chloride spray and cleaned again with Chlorhexidine. The bilateral knees were then apirated/injected with a prefilled 20-gauge syringe of 3ml/60mg Durolane in each knee using the lateral approach without complications.  The patient tolerated this well.  A bandaid was applied and the patient ambulated out of the clinic on ther own accord without difficulty. Patient was instructed to avoid physical activity for 24-48 hours to prevent the knees  from swelling and may ice the knees as tolerated. Patient should contact the office if any signs of of infection appear: redness, fever, chills, drainage, swelling or warmth to the knees.        Procedure, treatment alternatives, risks and benefits explained, specific risks discussed. Consent was given by the patient. Immediately prior to procedure a time out was called to verify the correct patient, procedure, equipment, support staff and site/side marked as required. Patient was prepped and draped in the usual sterile fashion.

## 2025-01-07 ENCOUNTER — CONSULT (OUTPATIENT)
Dept: PHYSICAL MEDICINE AND REHAB | Facility: CLINIC | Age: 60
End: 2025-01-07
Payer: COMMERCIAL

## 2025-01-07 ENCOUNTER — APPOINTMENT (OUTPATIENT)
Dept: PRIMARY CARE | Facility: CLINIC | Age: 60
End: 2025-01-07
Payer: COMMERCIAL

## 2025-01-07 VITALS
TEMPERATURE: 98.1 F | RESPIRATION RATE: 20 BRPM | HEART RATE: 96 BPM | SYSTOLIC BLOOD PRESSURE: 123 MMHG | DIASTOLIC BLOOD PRESSURE: 83 MMHG

## 2025-01-07 DIAGNOSIS — M54.12 CERVICAL RADICULAR PAIN: ICD-10-CM

## 2025-01-07 DIAGNOSIS — M54.12 CERVICAL RADICULOPATHY: Primary | ICD-10-CM

## 2025-01-07 PROCEDURE — 99204 OFFICE O/P NEW MOD 45 MIN: CPT | Performed by: PHYSICAL MEDICINE & REHABILITATION

## 2025-01-07 PROCEDURE — 99214 OFFICE O/P EST MOD 30 MIN: CPT | Performed by: PHYSICAL MEDICINE & REHABILITATION

## 2025-01-07 PROCEDURE — RXMED WILLOW AMBULATORY MEDICATION CHARGE

## 2025-01-07 ASSESSMENT — PAIN SCALES - GENERAL: PAINLEVEL_OUTOF10: 4

## 2025-01-07 NOTE — PROGRESS NOTES
Ref by Dr. Canela for right shoulder and neck pain.  Dr. Canela did xray of neck and shoulder xray.  OT order waiting to schedule.

## 2025-01-07 NOTE — PROGRESS NOTES
Physical Medicine and Rehabilitation [facility] Consultation   01/07/25    Chief Complaint:  Neck Pain   Referred by ortho    HPI:  Paola Manley is a 59 y.o. old R handed female who presents to the clinic today at the request of ortho for evaluation of neck pain.  Onset: about 4 weeks ago, turned her neck felt something  Tried new pillow etc    Location: R upper trap and down the arm and in the neck  Radiation: tingling down the R arm, to all fingers  Quality: tingling burning  Duration: comes and goes  Aggravating factors:  cross body bag, in front  the computer  Alleviating factors: c  Severity: 4  Numbness/Tingling: Yes   Bowel or bladder incontinence: No   Current medication regimen: lyrica 200 mg tid for fibromyalgia and back pain,and Cymbalta   Feels weaker  Treatment to date:  Physical therapy: No   Medications taken to date for this complaint include the following:   As above    Voltaren gel has not tried    No balance issues,     Prior injections: Yes   R subacromial steroid injection 12/2024 w ortho; 20% relief  7.2024 bilateral SI joint injection. About every 6 months; fluero w pain management       Follows w pain management Dr. Brown for L spine pain.     Imaging  Reviewed w patient and personally 01/07/25  Ct cervical 4/2023  Contiguous axial images of the cervical spine were obtained without  intravenous contrast. Coronal and sagittal reformatted images were  obtained from the axial images.     FINDINGS:  No evidence acute fracture of the cervical spine. There is  straightening of the normal cervical lordosis. There is degenerative  change of the cervical spine with mild multilevel intervertebral disc  space narrowing and mild anterior osseous spurring. There is limited  evaluation of the soft tissues of the spinal canal. There is mild  multilevel posterior osseous spurring and disc protrusion. No  significant prevertebral soft tissue edema.      Impression   No evidence of acute fracture of the cervical  spine.     Straightening of the normal cervical lordosis which may be secondary  to patient positioning or muscle spasm.     Mild degenerative change of the cervical spine.     Xr R shoulder 0212/2204  FINDINGS:  No osseous, articular, or soft tissue abnormality.      IMPRESSION:  Normal radiographs right shoulder.    Past Medical History:   Diagnosis Date    Depression, unspecified     Depressive disorder    Medial meniscus tear 04/24/2023    Ocular rosacea     Personal history of other diseases of the digestive system 01/12/2016    History of esophageal reflux    Personal history of other diseases of the respiratory system 08/13/2018    History of acute sinusitis    Personal history of other endocrine, nutritional and metabolic disease     History of hypothyroidism    Personal history of other specified conditions     History of insomnia    Prediabetes     Prediabetes    Sacro-iliac pain     Tear of meniscus of right knee 04/24/2023    Ulcerative colitis         Past Surgical History:   Procedure Laterality Date    APPENDECTOMY  01/12/2016    Appendectomy    CHOLECYSTECTOMY  01/12/2016    Cholecystectomy    HERNIA REPAIR  01/12/2016    Inguinal Hernia Repair    HYSTERECTOMY  11/07/2017    Hysterectomy    MR HEAD ANGIO WO IV CONTRAST  02/06/2019    MR HEAD ANGIO WO IV CONTRAST 2/6/2019 [facility]    MR NECK ANGIO WO IV CONTRAST  02/06/2019    MR NECK ANGIO WO IV CONTRAST 2/6/2019 [facility]    OTHER SURGICAL HISTORY  08/12/2020    Knee arthroscopy    PANCREAS SURGERY      SINUS SURGERY  01/12/2016    Sinus Surgery    SPLENECTOMY, TOTAL  01/12/2016    Splenectomy    TOTAL ABDOMINAL HYSTERECTOMY W/ BILATERAL SALPINGOOPHORECTOMY  01/12/2016    Total Abdominal Hysterectomy With Removal Of Both Ovaries        Patient Active Problem List    Diagnosis Date Noted    Sacroiliitis (CMS-HCC) 12/13/2023    Colon adenomas 11/21/2023    Chronic idiopathic constipation 11/21/2023    Renal calculi 10/28/2023     Allergic rhinitis 04/24/2023    Anemia 04/24/2023    Anxiety 04/24/2023    B12 deficiency 04/24/2023    Fibromyalgia 04/24/2023    Ulcerative colitis 04/24/2023    Depression 04/24/2023    Diabetes mellitus type 2, uncomplicated (Multi) 04/24/2023    Arthralgia 04/24/2023    GERD (gastroesophageal reflux disease) 04/24/2023    Hyperlipidemia 04/24/2023    Hypothyroidism 04/24/2023    Obesity 04/24/2023    Spondylosis of lumbosacral region 04/24/2023    Vitamin D deficiency 04/24/2023    Weight gain 04/24/2023        Family History   Problem Relation Name Age of Onset    Arthritis Mother      Heart disease Mother      Other (CVA) Mother      Coronary artery disease Mother      Diabetes Mother      Osteoporosis Mother      Heart failure Mother      Kidney failure Mother      Alcohol abuse Father      Hypertension Father      Lung cancer Father      Diabetes Maternal Grandmother      Liver cancer Maternal Grandmother      Throat cancer Maternal Grandfather      Osteoarthritis Other          Grandmother        Current Outpatient Medications   Medication Sig Dispense Refill    azelastine (Astelin) 137 mcg (0.1 %) nasal spray Administer 1 spray into each nostril 2 times a day. 30 mL 3    buPROPion XL (Wellbutrin XL) 150 mg 24 hr tablet Take 1 tablet (150 mg) by mouth once daily in the morning. Take before meals.      cloNIDine (Catapres) 0.2 mg tablet TAKE ONE (1) TABLET AT BEDTIME      diclofenac sodium (Voltaren) 1 % gel Apply 4.5 inches (4 g) topically 4 times a day as needed (for pain). 100 g 2    diclofenac sodium (Voltaren) 1 % gel Apply 4.5 inches (4 g) topically 4 times a day. Apply 2-4 gm to affected area 100 g 1    doxycycline (Adoxa) 150 mg tablet Take 1 tablet (150 mg) by mouth once daily.      fluticasone (Flonase) 50 mcg/actuation nasal spray Administer 1 spray into each nostril 2 times a day. 18.2 g 3    levothyroxine (Synthroid, Levoxyl) 100 mcg tablet Please take by mouth every other day in an alternating  fashion opposite of the 88 mcg 90 tablet 0    levothyroxine (Synthroid, Levoxyl) 88 mcg tablet Please take by mouth every other day in an alternating fashion opposite of the 100 mcg 90 tablet 0    linaCLOtide (Linzess) 290 mcg capsule Take 1 capsule (290 mcg) by mouth once daily in the morning. Take before meals. DO NOT CRUSH OR CHEW 30 capsule 4    melatonin 10 mg capsule TAKE 1 CAPSULE Bedtime PRN      metaxalone (Skelaxin) 800 mg tablet TAKE ONE-HALF (1/2) TABLET TWO (2) TIMES A DAY AS NEEDED FOR MUSCLE SPASM 30 tablet 5    pantoprazole (ProtoNix) 40 mg EC tablet Take 1 tablet (40 mg) by mouth once daily. 90 tablet 1    pregabalin (Lyrica) 200 mg capsule TAKE 1 CAPSULE (200 MG) BY MOUTH 3 TIMES A DAY. 90 capsule 1    rosuvastatin (Crestor) 10 mg tablet Take 1 tablet (10 mg) by mouth once daily. 90 tablet 1    tirzepatide (Mounjaro) 15 mg/0.5 mL pen injector Inject 15 mg under the skin 1 (one) time per week. 2 mL 0    triamcinolone (Kenalog) 0.1 % cream APPLY TO LEGS TWO (2) TIMES A DAY AS NEEDED      Vyvanse 20 mg capsule Take 1 capsule (20 mg) by mouth once daily. Take in the morning      amoxicillin-pot clavulanate (Augmentin) 875-125 mg tablet Take 1 tablet (875 mg) by mouth 2 times a day. (Patient not taking: Reported on 1/7/2025) 14 tablet 0    cyanocobalamin (Vitamin B-12) 1,000 mcg/mL injection Inject 1 mL (1,000 mcg) into the muscle every 30 (thirty) days. 1 mL 11    DULoxetine (Cymbalta) 30 mg DR capsule Take 1 capsule (30 mg) by mouth once daily. To be taken in combination with duloxetine 60 mg daily for a total of 90 mg daily 30 capsule 5    DULoxetine (Cymbalta) 60 mg DR capsule Take 1 capsule (60 mg) by mouth once daily. To be taken in combination with duloxetine 30 mg daily for total dose of 90 mg daily 30 capsule 5    linaCLOtide (Linzess) 290 mcg capsule Take 1 capsule (290 mcg) by mouth once daily in the morning. Take before meals. Do not crush or chew. (Patient not taking: Reported on 1/7/2025)  90 capsule 1    propranolol (Inderal) 10 mg tablet Take 1 tablet (10 mg) by mouth 3 times a day as needed. (Patient not taking: Reported on 1/7/2025)       No current facility-administered medications for this visit.        Allergies   Allergen Reactions    Bactrim [Sulfamethoxazole-Trimethoprim] GI Upset    Cephalexin GI Upset     GASTROINTESTIAL IRRITATION    Ciprofloxacin GI Upset     Reaction from Community: Other(See Desc)    Darvocet A500 [Propoxyphene N-Acetaminophen] Itching    Nsaids (Non-Steroidal Anti-Inflammatory Drug) Other     Hx of ulcerative colitis    Vicodin [Hydrocodone-Acetaminophen] Itching        Social History     Socioeconomic History    Marital status:    Tobacco Use    Smoking status: Every Day     Types: Cigarettes    Smokeless tobacco: Never   Vaping Use    Vaping status: Never Used   Substance and Sexual Activity    Alcohol use: Not Currently     Comment: has not drank in 15 years due to medication    Drug use: Never   Lives w  mom and son  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  Smokes 4-5 cig/day  No alcohol  No drug use       Review of Systems  Constitutional:  Denies fever or chills   Eyes:  Denies change in visual acuity   HENT:  Denies nasal congestion or sore throat   Respiratory:  Denies cough or shortness of breath   Cardiovascular:  Denies chest pain or edema   GI:  Denies abdominal pain, nausea, vomiting, bloody stools or diarrhea   :  Denies dysuria   Integument:  Denies rash   Neurologic: as per HPI  MSK: Per above HPI  Endocrine:  Denies polyuria or polydipsia   Lymphatic:  Denies swollen glands   Psychiatric:  Denies depression or anxiety         Physical Exam:  /83 (BP Location: Left arm, Patient Position: Sitting)   Pulse 96   Temp 36.7 °C (98.1 °F)   Resp 20     General:  NAD, F    Psychiatric: appropriate mood & affect.   Cardiovascular:  Normal radial pulses; no UE  edema.  Respiratory:  Normal rate; unlabored breathing.  Skin:  Intact; no erythema; no  ecchymosis or rash.  Lymphatic:  No lymphadenopathy or lymphedema.  NEURO:  Alert and appropriate.  Speech fluent, conversing appropriately.   Motor:    Rt: SA 5/5, SER 5/5, EE 5/5, EF 5/5, WE 5/5, FDIM 5/5, ADM 5/5    Lt: SA 5/5, SER 5/5, EE 5/5, EF 5/5, WE 5/5, FDIM 5/5, ADM 5/5    Rt: HF 5/5    Lt: HF 5/5  Sensation:     Light touch intact in the b/l C5-T2 dermatomes.     PP: intact in the b/l C5-T2 dermatomes.  Reflexes:     Right: Biceps 2+, brachioradialis 2+, triceps 2+, patellar 2+, achilles 2+    Left: Biceps 2+, brachioradialis 2+, triceps 2+, patellar 2+, achilles 2+     Babinski's downgoing; no clonus     Hoffmans: negative bilateral   Gait: Normal, narrow based gait.  Tandem gait WNL.    MSK:  Inspection of the neck reveals no soft tissue swelling or ecchymoses.   Cervical flexion full; cervical extension to 10°, right rotation 20°; left rotation 20°.  There is tenderness over the R upper trap.   Special Tests:    Spurling's maneuver:+      Bakody's sign: positive           Impression:  Paola Manley is a 59 y.o. F w pmh of chronic lower back, sacroiliac joint dysfunction, fibromyalgia, and DM presenting with R arm pain due to cervical radiculopathy and secondary R upper trap and levator ani myofascial pain.       Plan:     Orders Placed This Encounter   Procedures    XR cervical spine 2-3 views    Referral to Physical Therapy    1. Xr C spine  2. Pt for core centralization, hep  3. Already on lyrical 200 mg tid, cymbalta and skelexin from pain management, can't do oral nsaids due to ho of UC  4. Trial Voltaren gel prn, lidocaine patch prn, alternate between ice/heat  5. Will bring back for R upper trap trigger point injections  6. If minimalrelief will order rmri and refer for cervical anthony    Fu 8 weeks    Thank you for the consultation.     Lisbet Hilliard MD      Physical Medicine and Rehabilitation

## 2025-01-09 ENCOUNTER — PHARMACY VISIT (OUTPATIENT)
Dept: PHARMACY | Facility: CLINIC | Age: 60
End: 2025-01-09
Payer: COMMERCIAL

## 2025-01-13 DIAGNOSIS — E53.8 B12 DEFICIENCY: ICD-10-CM

## 2025-01-13 DIAGNOSIS — E55.9 VITAMIN D DEFICIENCY: ICD-10-CM

## 2025-01-13 DIAGNOSIS — E11.9 TYPE 2 DIABETES MELLITUS WITHOUT COMPLICATION, WITHOUT LONG-TERM CURRENT USE OF INSULIN (MULTI): ICD-10-CM

## 2025-01-13 DIAGNOSIS — D64.9 ANEMIA, UNSPECIFIED TYPE: ICD-10-CM

## 2025-01-13 DIAGNOSIS — E78.2 MIXED HYPERLIPIDEMIA: ICD-10-CM

## 2025-01-13 DIAGNOSIS — E03.9 HYPOTHYROIDISM, UNSPECIFIED TYPE: ICD-10-CM

## 2025-01-17 ENCOUNTER — APPOINTMENT (OUTPATIENT)
Dept: PHYSICAL THERAPY | Facility: HOSPITAL | Age: 60
End: 2025-01-17
Payer: COMMERCIAL

## 2025-01-17 ENCOUNTER — HOSPITAL ENCOUNTER (OUTPATIENT)
Dept: RADIOLOGY | Facility: CLINIC | Age: 60
Discharge: HOME | End: 2025-01-17
Payer: COMMERCIAL

## 2025-01-17 ENCOUNTER — LAB (OUTPATIENT)
Dept: LAB | Facility: LAB | Age: 60
End: 2025-01-17
Payer: COMMERCIAL

## 2025-01-17 DIAGNOSIS — E03.9 HYPOTHYROIDISM, UNSPECIFIED TYPE: ICD-10-CM

## 2025-01-17 DIAGNOSIS — D64.9 ANEMIA, UNSPECIFIED TYPE: ICD-10-CM

## 2025-01-17 DIAGNOSIS — E55.9 VITAMIN D DEFICIENCY: ICD-10-CM

## 2025-01-17 DIAGNOSIS — E11.9 TYPE 2 DIABETES MELLITUS WITHOUT COMPLICATION, WITHOUT LONG-TERM CURRENT USE OF INSULIN (MULTI): ICD-10-CM

## 2025-01-17 DIAGNOSIS — E53.8 B12 DEFICIENCY: ICD-10-CM

## 2025-01-17 DIAGNOSIS — E78.2 MIXED HYPERLIPIDEMIA: ICD-10-CM

## 2025-01-17 DIAGNOSIS — M54.12 CERVICAL RADICULOPATHY: ICD-10-CM

## 2025-01-17 LAB
25(OH)D3 SERPL-MCNC: 49 NG/ML (ref 30–100)
ALBUMIN SERPL BCP-MCNC: 4.3 G/DL (ref 3.4–5)
ALP SERPL-CCNC: 80 U/L (ref 33–110)
ALT SERPL W P-5'-P-CCNC: 14 U/L (ref 7–45)
ANION GAP SERPL CALC-SCNC: 11 MMOL/L (ref 10–20)
AST SERPL W P-5'-P-CCNC: 15 U/L (ref 9–39)
BASOPHILS # BLD AUTO: 0.04 X10*3/UL (ref 0–0.1)
BASOPHILS NFR BLD AUTO: 0.4 %
BILIRUB SERPL-MCNC: 0.4 MG/DL (ref 0–1.2)
BUN SERPL-MCNC: 15 MG/DL (ref 6–23)
CALCIUM SERPL-MCNC: 9.1 MG/DL (ref 8.6–10.3)
CHLORIDE SERPL-SCNC: 107 MMOL/L (ref 98–107)
CHOLEST SERPL-MCNC: 167 MG/DL (ref 0–199)
CHOLESTEROL/HDL RATIO: 2.3
CO2 SERPL-SCNC: 28 MMOL/L (ref 21–32)
CREAT SERPL-MCNC: 0.71 MG/DL (ref 0.5–1.05)
EGFRCR SERPLBLD CKD-EPI 2021: >90 ML/MIN/1.73M*2
EOSINOPHIL # BLD AUTO: 0.15 X10*3/UL (ref 0–0.7)
EOSINOPHIL NFR BLD AUTO: 1.6 %
ERYTHROCYTE [DISTWIDTH] IN BLOOD BY AUTOMATED COUNT: 16.3 % (ref 11.5–14.5)
GLUCOSE SERPL-MCNC: 69 MG/DL (ref 74–99)
HCT VFR BLD AUTO: 40.6 % (ref 36–46)
HDLC SERPL-MCNC: 72.5 MG/DL
HGB BLD-MCNC: 12.9 G/DL (ref 12–16)
IMM GRANULOCYTES # BLD AUTO: 0.04 X10*3/UL (ref 0–0.7)
IMM GRANULOCYTES NFR BLD AUTO: 0.4 % (ref 0–0.9)
LDLC SERPL CALC-MCNC: 82 MG/DL
LYMPHOCYTES # BLD AUTO: 3.15 X10*3/UL (ref 1.2–4.8)
LYMPHOCYTES NFR BLD AUTO: 34.6 %
MCH RBC QN AUTO: 31.4 PG (ref 26–34)
MCHC RBC AUTO-ENTMCNC: 31.8 G/DL (ref 32–36)
MCV RBC AUTO: 99 FL (ref 80–100)
MONOCYTES # BLD AUTO: 0.59 X10*3/UL (ref 0.1–1)
MONOCYTES NFR BLD AUTO: 6.5 %
NEUTROPHILS # BLD AUTO: 5.14 X10*3/UL (ref 1.2–7.7)
NEUTROPHILS NFR BLD AUTO: 56.5 %
NON HDL CHOLESTEROL: 95 MG/DL (ref 0–149)
NRBC BLD-RTO: 0 /100 WBCS (ref 0–0)
PLATELET # BLD AUTO: 254 X10*3/UL (ref 150–450)
POTASSIUM SERPL-SCNC: 4 MMOL/L (ref 3.5–5.3)
PROT SERPL-MCNC: 6.9 G/DL (ref 6.4–8.2)
RBC # BLD AUTO: 4.11 X10*6/UL (ref 4–5.2)
SODIUM SERPL-SCNC: 142 MMOL/L (ref 136–145)
T4 FREE SERPL-MCNC: 1.13 NG/DL (ref 0.61–1.12)
TRIGL SERPL-MCNC: 61 MG/DL (ref 0–149)
TSH SERPL-ACNC: 0.01 MIU/L (ref 0.44–3.98)
VIT B12 SERPL-MCNC: 128 PG/ML (ref 211–911)
VLDL: 12 MG/DL (ref 0–40)
WBC # BLD AUTO: 9.1 X10*3/UL (ref 4.4–11.3)

## 2025-01-17 PROCEDURE — 82607 VITAMIN B-12: CPT

## 2025-01-17 PROCEDURE — 72040 X-RAY EXAM NECK SPINE 2-3 VW: CPT

## 2025-01-17 PROCEDURE — 85025 COMPLETE CBC W/AUTO DIFF WBC: CPT

## 2025-01-17 PROCEDURE — 83036 HEMOGLOBIN GLYCOSYLATED A1C: CPT

## 2025-01-17 PROCEDURE — 80061 LIPID PANEL: CPT

## 2025-01-17 PROCEDURE — 80053 COMPREHEN METABOLIC PANEL: CPT

## 2025-01-17 PROCEDURE — 82306 VITAMIN D 25 HYDROXY: CPT

## 2025-01-17 PROCEDURE — 84443 ASSAY THYROID STIM HORMONE: CPT

## 2025-01-17 PROCEDURE — 84439 ASSAY OF FREE THYROXINE: CPT

## 2025-01-18 LAB
EST. AVERAGE GLUCOSE BLD GHB EST-MCNC: 105 MG/DL
HBA1C MFR BLD: 5.3 %

## 2025-01-18 NOTE — RESULT ENCOUNTER NOTE
B12 continues to be low at 128  Sugar slightly low and fasting but otherwise liver, kidneys, electrolytes within normal limits    Thyroid-stimulating hormone low at 0.01 and the free T4 elevated  This tells us that she has too much supplementation  I would recommend she only takes 88 mcg of levothyroxine daily    Complete blood cell count shows no anemia    Hemoglobin A1c one of the best on file at 5.3%    Vitamin D within normal limits    Cholesterol is good at 167, HDL 72, LDL 82, triglycerides 61

## 2025-01-22 NOTE — PROGRESS NOTES
Subjective   Patient ID: Paola Manley is a 60 y.o. female who presents for Medication refills  Today she is accompanied by alone.     HPI    1. Type 2 Diabetes  She was recently started on Mounjaro in November 2022 due to weight loss clinic.  Her last hemoglobin A1c was performed in 01/2025 and showed a value of 5.3%  Not currently on an ACE inhibitor or ARB.  She is tolerating her medication.  Currently down 50+ pounds  Denies polydipsia or dry mouth.  Requesting refills of the Mounjaro and feels well    2. Allergic rhinitis  She continues to use Flonase and Astelin as needed.  Requesting refills of both her Astelin and fluticasone  Denies sinus pressure, fever, chills, sore throat, chest pain, or shortness of breath.    3.  Constipation/ulcerative colitis  She is taking Linzess 290 mcg as needed.  Also noted that she is following up with Dr. Holman   She states that this helps when she notices that she is getting more constipated.  Requesting refills.  Did perform a colonoscopy in fall 2023 with a 5-year clearance    4. Hyperlipidemia  Currently taking rosuvastatin 10 mg daily.  She is tolerating the medication, denies myalgias.  Last cholesterol was great and stable with an LDL was at 82  Requesting refills    5. Hypothyroidism  Currently on Levothyroxine 100 mcg, but most recent TSH value is low at 0.01, with free T4 of 1.13  States she went back to the Levothyroxine 88 mcg after her most recent TSA labs.    6.  GERD  Currently takes pantoprazole 40 mg daily.  States that this medication continues to help with her symptoms, and knows when she forgets to take her medication that her symptoms returns.  Last endoscopy was in 2016  Again, currently following up with gastroenterology    7. Vitamin B12 deficiency  In the past she has been receiving vitamin B12 injections.  However, since the beginning of COVID-19 pandemic, she did not want to come to the office monthly and had stopped getting her vitamin B12  shots.  B12 once again is low at 128  She continues to do B12 injections at home  Requesting refills    8.  Nicotine dependence  Continues to smoke a few cigarettes daily  Still has nicotine patches at home    9.  Left knee pain  Continues to follow-up with the orthopedic provider for injections and even possibly considering a replacement    10. URI  States she was Augmentin 3-4 weeks ago for sinusitis which has not resolved. Reports continued congestion and sinus pressure.  Currently using flonase, astelin, and OTC mucinex.    11. Right arm numbness  Reports right arm numbness/tingling with occasional weakness. States this has been going on for quite some time now and that she is seeing PM&R for it. Recently started PT in hopes of resolution of symptoms.     Cervical X-ray showed mild multilevel cervical degenerative change with diffuse facet arthrosis and disc disease slightly more prominent C4-5. Alignment normal.    States her mother does have a history of osteoporosis as well.    Current Outpatient Medications on File Prior to Visit   Medication Sig Dispense Refill    buPROPion XL (Wellbutrin XL) 150 mg 24 hr tablet Take 1 tablet (150 mg) by mouth once daily in the morning. Take before meals.      cloNIDine (Catapres) 0.2 mg tablet TAKE ONE (1) TABLET AT BEDTIME      diclofenac sodium (Voltaren) 1 % gel Apply 4.5 inches (4 g) topically 4 times a day as needed (for pain). 100 g 2    diclofenac sodium (Voltaren) 1 % gel Apply 4.5 inches (4 g) topically 4 times a day. Apply 2-4 gm to affected area 100 g 1    doxycycline (Adoxa) 150 mg tablet Take 1 tablet (150 mg) by mouth once daily.      DULoxetine (Cymbalta) 30 mg DR capsule Take 1 capsule (30 mg) by mouth once daily. To be taken in combination with duloxetine 60 mg daily for a total of 90 mg daily 30 capsule 5    DULoxetine (Cymbalta) 60 mg DR capsule Take 1 capsule (60 mg) by mouth once daily. To be taken in combination with duloxetine 30 mg daily for total  dose of 90 mg daily 30 capsule 5    melatonin 10 mg capsule TAKE 1 CAPSULE Bedtime PRN      metaxalone (Skelaxin) 800 mg tablet TAKE ONE-HALF (1/2) TABLET TWO (2) TIMES A DAY AS NEEDED FOR MUSCLE SPASM 30 tablet 5    pregabalin (Lyrica) 200 mg capsule TAKE 1 CAPSULE (200 MG) BY MOUTH 3 TIMES A DAY. 90 capsule 1    triamcinolone (Kenalog) 0.1 % cream APPLY TO LEGS TWO (2) TIMES A DAY AS NEEDED      Vyvanse 20 mg capsule Take 1 capsule (20 mg) by mouth once daily. Take in the morning      [DISCONTINUED] azelastine (Astelin) 137 mcg (0.1 %) nasal spray Administer 1 spray into each nostril 2 times a day. 30 mL 3    [DISCONTINUED] cyanocobalamin (Vitamin B-12) 1,000 mcg/mL injection Inject 1 mL (1,000 mcg) into the muscle every 30 (thirty) days. 1 mL 11    [DISCONTINUED] fluticasone (Flonase) 50 mcg/actuation nasal spray Administer 1 spray into each nostril 2 times a day. 18.2 g 3    [DISCONTINUED] levothyroxine (Synthroid, Levoxyl) 100 mcg tablet Please take by mouth every other day in an alternating fashion opposite of the 88 mcg 90 tablet 0    [DISCONTINUED] levothyroxine (Synthroid, Levoxyl) 88 mcg tablet Please take by mouth every other day in an alternating fashion opposite of the 100 mcg 90 tablet 0    [DISCONTINUED] linaCLOtide (Linzess) 290 mcg capsule Take 1 capsule (290 mcg) by mouth once daily in the morning. Take before meals. DO NOT CRUSH OR CHEW 30 capsule 4    [DISCONTINUED] pantoprazole (ProtoNix) 40 mg EC tablet Take 1 tablet (40 mg) by mouth once daily. 90 tablet 1    [DISCONTINUED] rosuvastatin (Crestor) 10 mg tablet Take 1 tablet (10 mg) by mouth once daily. 90 tablet 1    [DISCONTINUED] tirzepatide (Mounjaro) 15 mg/0.5 mL pen injector Inject 15 mg under the skin 1 (one) time per week. 2 mL 0     No current facility-administered medications on file prior to visit.        Allergies   Allergen Reactions    Bactrim [Sulfamethoxazole-Trimethoprim] GI Upset    Cephalexin GI Upset     GASTROINTESTIAL  IRRITATION    Ciprofloxacin GI Upset     Reaction from Community: Other(See Desc)    Darvocet A500 [Propoxyphene N-Acetaminophen] Itching    Nsaids (Non-Steroidal Anti-Inflammatory Drug) Other     Hx of ulcerative colitis    Vicodin [Hydrocodone-Acetaminophen] Itching       Immunization History   Administered Date(s) Administered    Flu vaccine (IIV4), preservative free *Check age/dose* 10/11/2017, 12/20/2018, 09/28/2020, 11/24/2021, 12/29/2023    Flu vaccine, trivalent, preservative free, age 6 months and greater (Fluarix/Fluzone/Flulaval) 11/12/2013, 01/23/2025    Hib (PRP-D) 12/21/2012    Influenza, Unspecified 11/24/2021    Influenza, seasonal, injectable 01/07/2015, 10/06/2015    Meningococcal MCV4, Unspecified 12/21/2012    Meningococcal, Unknown Serogroups 12/21/2012    Moderna COVID-19 vaccine, 12 years and older (50mcg/0.5mL)(Spikevax) 12/29/2023    Moderna SARS-CoV-2 Vaccination 04/08/2021, 05/06/2021, 01/17/2022    Pneumococcal Conjugate PCV 7 12/21/2012    Pneumococcal conjugate vaccine, 20-valent (PREVNAR 20) 12/15/2023    Tdap vaccine, age 7 year and older (BOOSTRIX, ADACEL) 11/11/2013, 12/15/2023    Zoster vaccine, recombinant, adult (SHINGRIX) 01/17/2022, 01/24/2023         Review of Systems  All pertinent positive symptoms are included in the history of present illness.  All other systems have been reviewed and are negative and noncontributory to this patient's current ailments.     Objective   /80 (BP Location: Left arm, Patient Position: Sitting, BP Cuff Size: Adult)   Pulse 103   Wt 98 kg (216 lb)   SpO2 98%   BMI 32.84 kg/m²   BSA: 2.17 meters squared  Lab on 01/17/2025   Component Date Value Ref Range Status    Thyroid Stimulating Hormone 01/17/2025 0.01 (L)  0.44 - 3.98 mIU/L Final    Cholesterol 01/17/2025 167  0 - 199 mg/dL Final          Age      Desirable   Borderline High   High     0-19 Y     0 - 169       170 - 199     >/= 200    20-24 Y     0 - 189       190 - 224     >/= 225          >24 Y     0 - 199       200 - 239     >/= 240   **All ranges are based on fasting samples. Specific   therapeutic targets will vary based on patient-specific   cardiac risk.    Pediatric guidelines reference:Pediatrics 2011, 128(S5).Adult guidelines reference: NCEP ATPIII Guidelines,[place] 2001, 258:2486-97    Venipuncture immediately after or during the administration of Metamizole may lead to falsely low results. Testing should be performed immediately prior to Metamizole dosing.    HDL-Cholesterol 01/17/2025 72.5  mg/dL Final      Age       Very Low   Low     Normal    High    0-19 Y    < 35      < 40     40-45     ----  20-24 Y    ----     < 40      >45      ----        >24 Y      ----     < 40     40-60      >60      Cholesterol/HDL Ratio 01/17/2025 2.3   Final      Ref Values  Desirable  < 3.4  High Risk  > 5.0    LDL Calculated 01/17/2025 82  <=99 mg/dL Final                                Near   Borderline      AGE      Desirable  Optimal    High     High     Very High     0-19 Y     0 - 109     ---    110-129   >/= 130     ----    20-24 Y     0 - 119     ---    120-159   >/= 160     ----      >24 Y     0 -  99   100-129  130-159   160-189     >/=190      VLDL 01/17/2025 12  0 - 40 mg/dL Final    Triglycerides 01/17/2025 61  0 - 149 mg/dL Final    Age              Desirable        Borderline         High        Very High  SEX:B           mg/dL             mg/dL               mg/dL      mg/dL  <=14D                       ----               ----        ----  15D-365D                    ----               ----        ----  1Y-9Y           0-74               75-99             >=100       ----  10Y-19Y        0-89                            >=130       ----  20Y-24Y        0-114             115-149             >=150      ----  >= 25Y         0-149             150-199             200-499    >=500      Venipuncture immediately after or during the administration of Metamizole may lead to  falsely low results. Testing should be performed immediately prior to Metamizole dosing.    Non HDL Cholesterol 01/17/2025 95  0 - 149 mg/dL Final          Age       Desirable   Borderline High   High     Very High     0-19 Y     0 - 119       120 - 144     >/= 145    >/= 160    20-24 Y     0 - 149       150 - 189     >/= 190      ----         >24 Y    30 mg/dL above LDL Cholesterol goal      Glucose 01/17/2025 69 (L)  74 - 99 mg/dL Final    Sodium 01/17/2025 142  136 - 145 mmol/L Final    Potassium 01/17/2025 4.0  3.5 - 5.3 mmol/L Final    Chloride 01/17/2025 107  98 - 107 mmol/L Final    Bicarbonate 01/17/2025 28  21 - 32 mmol/L Final    Anion Gap 01/17/2025 11  10 - 20 mmol/L Final    Urea Nitrogen 01/17/2025 15  6 - 23 mg/dL Final    Creatinine 01/17/2025 0.71  0.50 - 1.05 mg/dL Final    eGFR 01/17/2025 >90  >60 mL/min/1.73m*2 Final    Calculations of estimated GFR are performed using the 2021 CKD-EPI Study Refit equation without the race variable for the IDMS-Traceable creatinine methods.  https://jasn.asnjournals.org/content/early/2021/09/22/ASN.9247356119    Calcium 01/17/2025 9.1  8.6 - 10.3 mg/dL Final    Albumin 01/17/2025 4.3  3.4 - 5.0 g/dL Final    Alkaline Phosphatase 01/17/2025 80  33 - 110 U/L Final    Total Protein 01/17/2025 6.9  6.4 - 8.2 g/dL Final    AST 01/17/2025 15  9 - 39 U/L Final    Bilirubin, Total 01/17/2025 0.4  0.0 - 1.2 mg/dL Final    ALT 01/17/2025 14  7 - 45 U/L Final    Patients treated with Sulfasalazine may generate falsely decreased results for ALT.    WBC 01/17/2025 9.1  4.4 - 11.3 x10*3/uL Final    nRBC 01/17/2025 0.0  0.0 - 0.0 /100 WBCs Final    RBC 01/17/2025 4.11  4.00 - 5.20 x10*6/uL Final    Hemoglobin 01/17/2025 12.9  12.0 - 16.0 g/dL Final    Hematocrit 01/17/2025 40.6  36.0 - 46.0 % Final    MCV 01/17/2025 99  80 - 100 fL Final    MCH 01/17/2025 31.4  26.0 - 34.0 pg Final    MCHC 01/17/2025 31.8 (L)  32.0 - 36.0 g/dL Final    RDW 01/17/2025 16.3 (H)  11.5 - 14.5 %  Final    Platelets 01/17/2025 254  150 - 450 x10*3/uL Final    Neutrophils % 01/17/2025 56.5  40.0 - 80.0 % Final    Immature Granulocytes %, Automated 01/17/2025 0.4  0.0 - 0.9 % Final    Immature Granulocyte Count (IG) includes promyelocytes, myelocytes and metamyelocytes but does not include bands. Percent differential counts (%) should be interpreted in the context of the absolute cell counts (cells/UL).    Lymphocytes % 01/17/2025 34.6  13.0 - 44.0 % Final    Monocytes % 01/17/2025 6.5  2.0 - 10.0 % Final    Eosinophils % 01/17/2025 1.6  0.0 - 6.0 % Final    Basophils % 01/17/2025 0.4  0.0 - 2.0 % Final    Neutrophils Absolute 01/17/2025 5.14  1.20 - 7.70 x10*3/uL Final    Percent differential counts (%) should be interpreted in the context of the absolute cell counts (cells/uL).    Immature Granulocytes Absolute, Au* 01/17/2025 0.04  0.00 - 0.70 x10*3/uL Final    Lymphocytes Absolute 01/17/2025 3.15  1.20 - 4.80 x10*3/uL Final    Monocytes Absolute 01/17/2025 0.59  0.10 - 1.00 x10*3/uL Final    Eosinophils Absolute 01/17/2025 0.15  0.00 - 0.70 x10*3/uL Final    Basophils Absolute 01/17/2025 0.04  0.00 - 0.10 x10*3/uL Final    Hemoglobin A1C 01/17/2025 5.3  See comment % Final    Estimated Average Glucose 01/17/2025 105  Not Established mg/dL Final    Vitamin D, 25-Hydroxy, Total 01/17/2025 49  30 - 100 ng/mL Final    Vitamin B12 01/17/2025 128 (L)  211 - 911 pg/mL Final    Thyroxine, Free 01/17/2025 1.13 (H)  0.61 - 1.12 ng/dL Final       Physical Exam  CONSTITUTIONAL - well nourished, well developed, looks like stated age, in no acute distress, not ill-appearing, and not tired appearing  SKIN - normal skin color and pigmentation, normal skin turgor without rash, lesions, or nodules visualized  HEAD - no trauma, normocephalic  EYES - normal external exam  ENT - TM's intact, no injection, no signs of infection, uvula midline, normal tongue movement and throat normal, no exudate, nasal passage without discharge  and patent  NECK - supple without rigidity, no neck mass was observed, no thyromegaly or thyroid nodules  CHEST - clear to auscultation, no wheezing, no crackles and no rales, good effort  CARDIAC - regular rate and regular rhythm, no skipped beats, no murmur  EXTREMITIES - no edema, no deformities  NEUROLOGICAL - normal gait, normal balance, normal motor, no ataxia  PSYCHIATRIC - alert, pleasant and cordial, age-appropriate      Assessment/Plan     1. Type 2 Diabetes  Continue Mounjaro   A1c one of the best on file  Continue the great work    2. Allergic rhinitis  happy to hear that your allergies appear to be well controlled with Flonase and Astelin.  I will send in other prescriptions at this time    3.  Constipation  Refills provided for Linzess 290 mcg daily as needed.  Otherwise, please follow-up with your gastroenterologist for continued care/recommendations    4. Hyperlipidemia  Cholesterol looks good  Continue medications without any changes, rosuvastatin 10 mg  CT cardiac score ordered to be done at your earliest mutual convenience to further evaluate for plaque/calcification within the coronary arteries    5. Hypothyroidism  Due to your TSH being low which was surprising, we will decreased your levothyroxine from 100 mcg to 88 mcg  Will repeat your lab work in the next 4-6 weeks    6. Esophageal reflux  Stable, continue to take pantoprazole 40 mg daily.    7. Vitamin B12 deficiency  Continue B12 injections  This was sent to your local pharmacy    8.  Nicotine dependence  Please let me know if you would like more nicotine patches  CT low-dose was ordered to be done at your earliest major convenience    9.  Left knee pain  Please continue following up with the orthopedic provider for further options/recommendations    10. URI  Risks, benefits, and options of treatment(s) were discussed after reviewing all current medication(s) and drug allergy(ies)  I opted for the treatment that we discussed with  instructions on the medication use for your underlying medical ailment(s)  Treatment includes Augmentin 875-125 mg BID x 10 days  Flonase and Astelin nasal sprays sent to the pharmacy  I encouraged supportive care such as rest and fluids.  If symptoms fail to resolve after the antibiotics, we will need to do a CT scan.    11. Right arm numbness/tingling  Continue to follow up with PM&R per their protocol.  Continue with PT.  A DEXA scan was ordered to be done at your earliest convenience due to the history of osteoporosis in your mom, as well as the neck and knee pain    Follow-up in 3 months or sooner if needed. Please let me know if the Augmentin helps your sinus infection.

## 2025-01-23 ENCOUNTER — OFFICE VISIT (OUTPATIENT)
Dept: PRIMARY CARE | Facility: CLINIC | Age: 60
End: 2025-01-23
Payer: COMMERCIAL

## 2025-01-23 ENCOUNTER — APPOINTMENT (OUTPATIENT)
Dept: PRIMARY CARE | Facility: CLINIC | Age: 60
End: 2025-01-23
Payer: COMMERCIAL

## 2025-01-23 VITALS
OXYGEN SATURATION: 98 % | WEIGHT: 216 LBS | HEART RATE: 103 BPM | DIASTOLIC BLOOD PRESSURE: 80 MMHG | SYSTOLIC BLOOD PRESSURE: 128 MMHG | BODY MASS INDEX: 32.84 KG/M2

## 2025-01-23 DIAGNOSIS — J01.90 ACUTE NON-RECURRENT SINUSITIS, UNSPECIFIED LOCATION: Primary | ICD-10-CM

## 2025-01-23 DIAGNOSIS — K59.04 CHRONIC IDIOPATHIC CONSTIPATION: ICD-10-CM

## 2025-01-23 DIAGNOSIS — E53.8 B12 DEFICIENCY: ICD-10-CM

## 2025-01-23 DIAGNOSIS — R20.0 RIGHT ARM NUMBNESS: ICD-10-CM

## 2025-01-23 DIAGNOSIS — E11.9 TYPE 2 DIABETES MELLITUS WITHOUT COMPLICATION, WITHOUT LONG-TERM CURRENT USE OF INSULIN (MULTI): ICD-10-CM

## 2025-01-23 DIAGNOSIS — E78.2 MIXED HYPERLIPIDEMIA: ICD-10-CM

## 2025-01-23 DIAGNOSIS — Z23 FLU VACCINE NEED: ICD-10-CM

## 2025-01-23 DIAGNOSIS — K21.9 GASTROESOPHAGEAL REFLUX DISEASE WITHOUT ESOPHAGITIS: ICD-10-CM

## 2025-01-23 DIAGNOSIS — Z78.0 POST-MENOPAUSAL: ICD-10-CM

## 2025-01-23 DIAGNOSIS — E03.9 HYPOTHYROIDISM, UNSPECIFIED TYPE: ICD-10-CM

## 2025-01-23 DIAGNOSIS — J30.9 ALLERGIC RHINITIS, UNSPECIFIED SEASONALITY, UNSPECIFIED TRIGGER: ICD-10-CM

## 2025-01-23 PROCEDURE — 3079F DIAST BP 80-89 MM HG: CPT | Performed by: STUDENT IN AN ORGANIZED HEALTH CARE EDUCATION/TRAINING PROGRAM

## 2025-01-23 PROCEDURE — 90471 IMMUNIZATION ADMIN: CPT | Performed by: STUDENT IN AN ORGANIZED HEALTH CARE EDUCATION/TRAINING PROGRAM

## 2025-01-23 PROCEDURE — 3074F SYST BP LT 130 MM HG: CPT | Performed by: STUDENT IN AN ORGANIZED HEALTH CARE EDUCATION/TRAINING PROGRAM

## 2025-01-23 PROCEDURE — 99215 OFFICE O/P EST HI 40 MIN: CPT | Performed by: STUDENT IN AN ORGANIZED HEALTH CARE EDUCATION/TRAINING PROGRAM

## 2025-01-23 PROCEDURE — 3044F HG A1C LEVEL LT 7.0%: CPT | Performed by: STUDENT IN AN ORGANIZED HEALTH CARE EDUCATION/TRAINING PROGRAM

## 2025-01-23 PROCEDURE — 3048F LDL-C <100 MG/DL: CPT | Performed by: STUDENT IN AN ORGANIZED HEALTH CARE EDUCATION/TRAINING PROGRAM

## 2025-01-23 PROCEDURE — 90656 IIV3 VACC NO PRSV 0.5 ML IM: CPT | Performed by: STUDENT IN AN ORGANIZED HEALTH CARE EDUCATION/TRAINING PROGRAM

## 2025-01-23 RX ORDER — TIRZEPATIDE 15 MG/.5ML
15 INJECTION, SOLUTION SUBCUTANEOUS
Qty: 6 ML | Refills: 1 | Status: SHIPPED | OUTPATIENT
Start: 2025-01-26

## 2025-01-23 RX ORDER — ROSUVASTATIN CALCIUM 10 MG/1
10 TABLET, COATED ORAL DAILY
Qty: 90 TABLET | Refills: 1 | Status: SHIPPED | OUTPATIENT
Start: 2025-01-23

## 2025-01-23 RX ORDER — AZELASTINE 1 MG/ML
1 SPRAY, METERED NASAL 2 TIMES DAILY
Qty: 30 ML | Refills: 3 | Status: SHIPPED | OUTPATIENT
Start: 2025-01-23

## 2025-01-23 RX ORDER — FLUTICASONE PROPIONATE 50 MCG
1 SPRAY, SUSPENSION (ML) NASAL 2 TIMES DAILY
Qty: 18.2 G | Refills: 3 | Status: SHIPPED | OUTPATIENT
Start: 2025-01-23

## 2025-01-23 RX ORDER — LEVOTHYROXINE SODIUM 88 UG/1
88 TABLET ORAL DAILY
Qty: 90 TABLET | Refills: 0 | Status: SHIPPED | OUTPATIENT
Start: 2025-01-23

## 2025-01-23 RX ORDER — AMOXICILLIN AND CLAVULANATE POTASSIUM 875; 125 MG/1; MG/1
875 TABLET, FILM COATED ORAL 2 TIMES DAILY
Qty: 20 TABLET | Refills: 0 | Status: SHIPPED | OUTPATIENT
Start: 2025-01-23 | End: 2025-02-02

## 2025-01-23 RX ORDER — PANTOPRAZOLE SODIUM 40 MG/1
40 TABLET, DELAYED RELEASE ORAL DAILY
Qty: 90 TABLET | Refills: 1 | Status: SHIPPED | OUTPATIENT
Start: 2025-01-23

## 2025-01-23 RX ORDER — TIRZEPATIDE 15 MG/.5ML
15 INJECTION, SOLUTION SUBCUTANEOUS
Qty: 6 ML | Refills: 1 | Status: SHIPPED | OUTPATIENT
Start: 2025-01-26 | End: 2025-01-23 | Stop reason: SDUPTHER

## 2025-01-23 RX ORDER — CYANOCOBALAMIN 1000 UG/ML
1000 INJECTION, SOLUTION INTRAMUSCULAR; SUBCUTANEOUS
Qty: 1 ML | Refills: 11 | Status: SHIPPED | OUTPATIENT
Start: 2025-01-23

## 2025-01-23 ASSESSMENT — PATIENT HEALTH QUESTIONNAIRE - PHQ9
SUM OF ALL RESPONSES TO PHQ9 QUESTIONS 1 AND 2: 0
2. FEELING DOWN, DEPRESSED OR HOPELESS: NOT AT ALL
1. LITTLE INTEREST OR PLEASURE IN DOING THINGS: NOT AT ALL

## 2025-02-13 ENCOUNTER — OFFICE VISIT (OUTPATIENT)
Dept: PAIN MEDICINE | Facility: HOSPITAL | Age: 60
End: 2025-02-13
Payer: COMMERCIAL

## 2025-02-13 VITALS
RESPIRATION RATE: 16 BRPM | DIASTOLIC BLOOD PRESSURE: 69 MMHG | WEIGHT: 216 LBS | OXYGEN SATURATION: 98 % | SYSTOLIC BLOOD PRESSURE: 109 MMHG | HEART RATE: 94 BPM | BODY MASS INDEX: 32.74 KG/M2 | HEIGHT: 68 IN

## 2025-02-13 DIAGNOSIS — M62.838 MUSCLE SPASM: ICD-10-CM

## 2025-02-13 DIAGNOSIS — M47.27 OSTEOARTHRITIS OF SPINE WITH RADICULOPATHY, LUMBOSACRAL REGION: ICD-10-CM

## 2025-02-13 DIAGNOSIS — M25.50 ARTHRALGIA, UNSPECIFIED JOINT: Primary | ICD-10-CM

## 2025-02-13 DIAGNOSIS — M46.1 SACROILIITIS (CMS-HCC): ICD-10-CM

## 2025-02-13 DIAGNOSIS — M54.16 LUMBAR RADICULAR PAIN: ICD-10-CM

## 2025-02-13 DIAGNOSIS — M79.7 FIBROMYALGIA: ICD-10-CM

## 2025-02-13 PROCEDURE — 3048F LDL-C <100 MG/DL: CPT | Performed by: CLINICAL NURSE SPECIALIST

## 2025-02-13 PROCEDURE — 3078F DIAST BP <80 MM HG: CPT | Performed by: CLINICAL NURSE SPECIALIST

## 2025-02-13 PROCEDURE — G2211 COMPLEX E/M VISIT ADD ON: HCPCS | Performed by: CLINICAL NURSE SPECIALIST

## 2025-02-13 PROCEDURE — 99214 OFFICE O/P EST MOD 30 MIN: CPT | Performed by: CLINICAL NURSE SPECIALIST

## 2025-02-13 PROCEDURE — 3008F BODY MASS INDEX DOCD: CPT | Performed by: CLINICAL NURSE SPECIALIST

## 2025-02-13 PROCEDURE — 3044F HG A1C LEVEL LT 7.0%: CPT | Performed by: CLINICAL NURSE SPECIALIST

## 2025-02-13 PROCEDURE — 3074F SYST BP LT 130 MM HG: CPT | Performed by: CLINICAL NURSE SPECIALIST

## 2025-02-13 RX ORDER — DULOXETIN HYDROCHLORIDE 30 MG/1
30 CAPSULE, DELAYED RELEASE ORAL DAILY
Qty: 30 CAPSULE | Refills: 5 | Status: SHIPPED | OUTPATIENT
Start: 2025-02-13 | End: 2025-03-15

## 2025-02-13 RX ORDER — METAXALONE 800 MG/1
TABLET ORAL
Qty: 30 TABLET | Refills: 2 | Status: SHIPPED | OUTPATIENT
Start: 2025-02-13

## 2025-02-13 RX ORDER — PREGABALIN 200 MG/1
200 CAPSULE ORAL 3 TIMES DAILY
Qty: 90 CAPSULE | Refills: 5 | Status: SHIPPED | OUTPATIENT
Start: 2025-02-13

## 2025-02-13 RX ORDER — DULOXETIN HYDROCHLORIDE 60 MG/1
60 CAPSULE, DELAYED RELEASE ORAL DAILY
Qty: 30 CAPSULE | Refills: 5 | Status: SHIPPED | OUTPATIENT
Start: 2025-02-13 | End: 2025-03-15

## 2025-02-13 ASSESSMENT — ENCOUNTER SYMPTOMS
DEPRESSION: 0
OCCASIONAL FEELINGS OF UNSTEADINESS: 1
LOSS OF SENSATION IN FEET: 0

## 2025-02-13 NOTE — PROGRESS NOTES
Subjective   Patient ID: Paola Manley is a 60 y.o. female who presents for sacroiliitis, fibromyalgia and lumbar pain      HPI    60-year-old female with history of low back pain, sacroiliitis and fibromyalgia presents for follow-up.  Patient also experiencing polyarticular pain most bothersome to knees and shoulders.  Lumbar x-ray consistent with mild degenerative changes throughout her lumbar spine.  Sent for formal course of physical therapy which she was unable to do secondary to caregiving responsibilities.  States she continues home therapy exercises and stretches.  Patient does well with bilateral SI injections which provide significant/sustained relief.  Receiving knee injections per orthopedics.  She does very well with Lyrica 200 mg 3 times daily, duloxetine 90 mg daily and Skelaxin as needed for muscle tightness and spasm.  She will supplement with occasional Tylenol.  Presenting at today's office visit for routine follow-up.  Currently experiencing an increase in low back pain with radiation into her right lower extremity to the level of her foot.  Denies numbness/tingling in her lower extremities. Intermittent weakness in her right lower extremity. Denies any changes in bowel/bladder function.  Patient is also experiencing an increase in cervical pain with radiation to her right shoulder and right upper extremity.  She has subjective weakness in her right upper extremity as well as intermittent numbness and tingling to her right hand.  Denies any balance issues.  She did have 1 fall when she slipped on ice approximately 3 to 4 weeks ago landing on her right side.  She is experiencing an increase in right knee pain after her recent fall.  She did have recent gel injections with orthopedics in December and felt that they were providing significant relief until she fell.  Evaluated by orthopedics for right shoulder pain and given a corticosteroid injection in December which provided limited relief.  At  that point she was sent to PM&R for evaluation of cervical pain.  Cervical x-ray consistent with mild multilevel cervical degenerative changes.  Patient was sent to physical therapy targeting her cervical symptoms as well as lumbar symptoms.  She just recently started her physical therapy.  She was also scheduled for cervical trigger point injections which she will do on 2/23/2025. Continues to manage her pain with Lyrica, duloxetine and Skelaxin as needed.  States that she utilizes her Skelaxin sparingly most often at bedtime when muscle tightness and spasms are most intense.  She will supplement with occasional Tylenol.  She currently is in physical therapy.    Location of Pain: pt is here for interval follow up. Pt states she has low back pain. Pt has fibromyalgia. Pt has radicular pain into the foot down the right leg. Right shoulder injection did not work and states possible pinched nerve.    Has PT for shoulder and back.          Pain Score:  5/10    Treatment:  Lyrica-needs refill  Last dose: 200 mg TID    Efficacy: 90%    Side Effects: none    Other pain medication/neuromodulator: Tylenol arthritis -taking 6 a day/ 90mg Cymbalta total-90mg/ Skelaxin-refills needed    Injections and/or Procedures: Dr. Carlin gel injections bilateral knees.  Bilateral SI Injection-90% relief     Other: Dr. Yeung was going to do trigger point injections    Genetic Swab:  Urine Screen: 8/14/24  Narcotics Agreement: 8/14/24  OA 6/6/24  O[facility]:  Margarita Cole, APRN-CNP, APRN-CNS on 2/13/2025  8:21 AM  I have personally reviewed the OARRS report for Paola Manley. I have considered the risks of abuse, dependence, addiction and diversion    Is the patient prescribed a combination of a benzodiazepine and opioid?  No    Last Urine Drug Screen / ordered today: No  Recent Results (from the past 8760 hours)   Amphetamine Confirm, Urine    Collection Time: 08/14/24 12:28 PM   Result Value Ref Range    Methamphetamine Quant, Ur <200 ng/mL     MDA, Urine <200 ng/mL    MDEA, Urine <200 ng/mL    Phentermine,Urine <200 ng/mL    Amphetamines,Urine 4362 ng/mL    MDMA, Urine <200 ng/mL   Confirmation Opiate/Opioid/Benzo Prescription Compliance    Collection Time: 08/14/24 12:28 PM   Result Value Ref Range    Clonazepam <25 <25 ng/mL    7-Aminoclonazepam <25 <25 ng/mL    Alprazolam <25 <25 ng/mL    Alpha-Hydroxyalprazolam <25 <25 ng/mL    Midazolam <25 <25 ng/mL    Alpha-Hydroxymidazolam <25 <25 ng/mL    Chlordiazepoxide <25 <25 ng/mL    Diazepam <25 <25 ng/mL    Nordiazepam <25 <25 ng/mL    Temazepam <25 <25 ng/mL    Oxazepam <25 <25 ng/mL    Lorazepam <25 <25 ng/mL    Methadone <25 <25 ng/mL    EDDP <25 <25 ng/mL    6-Acetylmorphine <25 <25 ng/mL    Codeine <50 <50 ng/mL    Hydrocodone <25 <25 ng/mL    Hydromorphone <25 <25 ng/mL    Morphine  <50 <50 ng/mL    Norhydrocodone <25 <25 ng/mL    Noroxycodone <25 <25 ng/mL    Oxycodone <25 <25 ng/mL    Oxymorphone <25 <25 ng/mL    Fentanyl <2.5 <2.5 ng/mL    Norfentanyl <2.5 <2.5 ng/mL    Tramadol <50 <50 ng/mL    O-Desmethyltramadol <50 <50 ng/mL    Zolpidem <25 <25 ng/mL    Zolpidem Metabolite (ZCA) <25 <25 ng/mL   Screen Opiate/Opioid/Benzo Prescription Compliance    Collection Time: 08/14/24 12:28 PM   Result Value Ref Range    Creatinine, Urine Random 169.2 20.0 - 320.0 mg/dL    Amphetamine Screen, Urine Presumptive Positive (A) Presumptive Negative    Barbiturate Screen, Urine Presumptive Negative Presumptive Negative    Cannabinoid Screen, Urine Presumptive Negative Presumptive Negative    Cocaine Metabolite Screen, Urine Presumptive Negative Presumptive Negative    PCP Screen, Urine Presumptive Negative Presumptive Negative   Tapentadol Confirmation, Urine    Collection Time: 08/14/24 12:28 PM   Result Value Ref Range    Tapentadol <25 <25 ng/mL    N-Desmethyltapentadol <25 <25 ng/mL   Buprenorphine Confirm,Urine    Collection Time: 08/14/24 12:28 PM   Result Value Ref Range    BUPRENORPHINE GLUC, URINE  <5 ng/mL    BUPRENORPHINE ,URINE <2 ng/mL    NALOXONE, URINE <100 ng/mL    NORBUPRENORPHINE GLUC,URINE <5 ng/mL    NORBUPRENORPHINE, URINE <2 ng/mL     Results are as expected.     Controlled Substance Agreement:  Date of the Last Agreement: 8/14/24  Reviewed Controlled Substance Agreement including but not limited to the benefits, risks, and alternatives to treatment with a Controlled Substance medication(s).      Review of Systems    ROS:   General: No fevers, chills, weight loss  Skin: Negative for lesions  Eyes: No acute vision changes  Ears: No vertigo  Nose, mouth, throat: No difficulty swallowing or speaking  Respiratory: No cough, shortness of breath, cyanosis  Cardiovascular: Negative for chest pain syncope or palpitation  Gastrointestinal: No constipation, nausea, vomiting  Neurological: Negative for headache, positive for:  Psychological: Negative for severe or debilitating anxiety, depression. Negative memory loss  Musculoskeletal: Positive for  Endocrine: Negative for weight gain, appetite changes, excessive sweating  Allergy/immune: Negative    All 13 systems were reviewed and are within normal levels except as noted or in the history of present illness.  Positive or pertinent negative responses are noted or were in the history of present illness. As noted, the patient denies significant or impairing weakness in the bilateral upper and lower extremities, medication induced constipation, and bowel or bladder incontinence.     Current Outpatient Medications:     azelastine (Astelin) 137 mcg (0.1 %) nasal spray, Administer 1 spray into each nostril 2 times a day., Disp: 30 mL, Rfl: 3    buPROPion XL (Wellbutrin XL) 150 mg 24 hr tablet, Take 1 tablet (150 mg) by mouth once daily in the morning. Take before meals., Disp: , Rfl:     cloNIDine (Catapres) 0.2 mg tablet, TAKE ONE (1) TABLET AT BEDTIME, Disp: , Rfl:     cyanocobalamin (Vitamin B-12) 1,000 mcg/mL injection, Inject 1 mL (1,000 mcg) into the muscle  every 30 (thirty) days., Disp: 1 mL, Rfl: 11    diclofenac sodium (Voltaren) 1 % gel, Apply 4.5 inches (4 g) topically 4 times a day as needed (for pain)., Disp: 100 g, Rfl: 2    diclofenac sodium (Voltaren) 1 % gel, Apply 4.5 inches (4 g) topically 4 times a day. Apply 2-4 gm to affected area, Disp: 100 g, Rfl: 1    doxycycline (Adoxa) 150 mg tablet, Take 1 tablet (150 mg) by mouth once daily., Disp: , Rfl:     fluticasone (Flonase) 50 mcg/actuation nasal spray, Administer 1 spray into each nostril 2 times a day., Disp: 18.2 g, Rfl: 3    levothyroxine (Synthroid, Levoxyl) 88 mcg tablet, Take 1 tablet (88 mcg) by mouth early in the morning.. Please take by mouth every other day in an alternating fashion opposite of the 100 mcg, Disp: 90 tablet, Rfl: 0    linaCLOtide (Linzess) 290 mcg capsule, Take 1 capsule (290 mcg) by mouth once daily in the morning. Take before meals. DO NOT CRUSH OR CHEW, Disp: 30 capsule, Rfl: 4    melatonin 10 mg capsule, TAKE 1 CAPSULE Bedtime PRN, Disp: , Rfl:     pantoprazole (ProtoNix) 40 mg EC tablet, Take 1 tablet (40 mg) by mouth once daily., Disp: 90 tablet, Rfl: 1    rosuvastatin (Crestor) 10 mg tablet, Take 1 tablet (10 mg) by mouth once daily., Disp: 90 tablet, Rfl: 1    tirzepatide (Mounjaro) 15 mg/0.5 mL pen injector, Inject 15 mg under the skin 1 (one) time per week., Disp: 6 mL, Rfl: 1    triamcinolone (Kenalog) 0.1 % cream, APPLY TO LEGS TWO (2) TIMES A DAY AS NEEDED, Disp: , Rfl:     Vyvanse 20 mg capsule, Take 1 capsule (20 mg) by mouth once daily. Take in the morning, Disp: , Rfl:     DULoxetine (Cymbalta) 30 mg DR capsule, Take 1 capsule (30 mg) by mouth once daily. To be taken in combination with duloxetine 60 mg daily for a total of 90 mg daily, Disp: 30 capsule, Rfl: 5    DULoxetine (Cymbalta) 60 mg DR capsule, Take 1 capsule (60 mg) by mouth once daily. To be taken in combination with duloxetine 30 mg daily for total dose of 90 mg daily, Disp: 30 capsule, Rfl: 5     metaxalone (Skelaxin) 800 mg tablet, TAKE ONE-HALF (1/2) TABLET TWO (2) TIMES A DAY AS NEEDED FOR MUSCLE SPASM, Disp: 30 tablet, Rfl: 2    pregabalin (Lyrica) 200 mg capsule, Take 1 capsule (200 mg) by mouth 3 times a day., Disp: 90 capsule, Rfl: 5     Past Medical History:   Diagnosis Date    Depression, unspecified     Depressive disorder    Medial meniscus tear 04/24/2023    Ocular rosacea     Personal history of other diseases of the digestive system 01/12/2016    History of esophageal reflux    Personal history of other diseases of the respiratory system 08/13/2018    History of acute sinusitis    Personal history of other endocrine, nutritional and metabolic disease     History of hypothyroidism    Personal history of other specified conditions     History of insomnia    Prediabetes     Prediabetes    Sacro-iliac pain     Tear of meniscus of right knee 04/24/2023    Ulcerative colitis         Past Surgical History:   Procedure Laterality Date    APPENDECTOMY  01/12/2016    Appendectomy    CHOLECYSTECTOMY  01/12/2016    Cholecystectomy    HERNIA REPAIR  01/12/2016    Inguinal Hernia Repair    HYSTERECTOMY  11/07/2017    Hysterectomy    MR HEAD ANGIO WO IV CONTRAST  02/06/2019    MR HEAD ANGIO WO IV CONTRAST 2/6/2019 [facility]    MR NECK ANGIO WO IV CONTRAST  02/06/2019    MR NECK ANGIO WO IV CONTRAST 2/6/2019 [facility]    OTHER SURGICAL HISTORY  08/12/2020    Knee arthroscopy    PANCREAS SURGERY      SINUS SURGERY  01/12/2016    Sinus Surgery    SPLENECTOMY, TOTAL  01/12/2016    Splenectomy    TOTAL ABDOMINAL HYSTERECTOMY W/ BILATERAL SALPINGOOPHORECTOMY  01/12/2016    Total Abdominal Hysterectomy With Removal Of Both Ovaries        Family History   Problem Relation Name Age of Onset    Arthritis Mother      Heart disease Mother      Other (CVA) Mother      Coronary artery disease Mother      Diabetes Mother      Osteoporosis Mother      Heart failure Mother      Kidney failure Mother       "Alcohol abuse Father      Hypertension Father      Lung cancer Father      Diabetes Maternal Grandmother      Liver cancer Maternal Grandmother      Throat cancer Maternal Grandfather      Osteoarthritis Other          Grandmother        Allergies   Allergen Reactions    Bactrim [Sulfamethoxazole-Trimethoprim] GI Upset    Cephalexin GI Upset     GASTROINTESTIAL IRRITATION    Ciprofloxacin GI Upset     Reaction from Community: Other(See Desc)    Darvocet A500 [Propoxyphene N-Acetaminophen] Itching    Nsaids (Non-Steroidal Anti-Inflammatory Drug) Other     Hx of ulcerative colitis    Vicodin [Hydrocodone-Acetaminophen] Itching        Objective     Visit Vitals  /69   Pulse 94   Resp 16   Ht 1.727 m (5' 8\")   Wt 98 kg (216 lb)   SpO2 98%   BMI 32.84 kg/m²   OB Status Postmenopausal   Smoking Status Every Day   BSA 2.17 m²        Physical Exam    PE:  General: Well-developed, well-nourished, no acute distress. The patient demonstrates no pain behavior, symptom magnification or overt drug-seeking behavior.  Eye: Pupils appropriate for room lighting  Neck/thyroid: No obvious goiter or enlargement of neck noted  Respiratory exam: Normal respiratory effort, unlabored respiration. No accessory muscle use noted  Cardiac exam: Bilateral radial pulses intact  Abdominal: Nondistended  Spine, cervical: Tenderness to paraspinous musculature paracervical region right greater than left.  Multiple myofascial tender points and muscle tightness upper trapezius muscles right greater than left.  Flexion intact with extension more limited.  Rotational twisting to the right increasing pain.  Spine, lumbar: The patient is able to rise from a seated to standing position without hesitancy, push off, or delay. Gait is grossly nonantalgic. Tenderness to paraspinous musculature is noted lower lumbar region.  Flexion intact with extension limited secondary to stiffness and pain.  Negative straight leg raise.  Minimal tenderness bilateral SI " joints.  Mildly positive Edinson, Gaenslen's and sacral thrust on the right.  Neurologic exam: Muscle strength is antigravity in all 4 extremities.  Equal muscle strength bilateral lower extremities 5/5.  Equal strength bilateral upper extremities 5/5.  Psychiatric exam: Judgment and insight normal, affect normal, speech is fluent, affect appropriate, demonstrating no signs of hypersomnolence, sedation, or confusion.        Assessment/Plan   Problem List Items Addressed This Visit             ICD-10-CM    Fibromyalgia M79.7    Relevant Medications    pregabalin (Lyrica) 200 mg capsule    DULoxetine (Cymbalta) 60 mg DR capsule    DULoxetine (Cymbalta) 30 mg DR capsule    Arthralgia - Primary M25.50    Spondylosis of lumbosacral region M47.817     60-year-old female with history of low back pain, sacroiliitis and fibromyalgia who has done very well with bilateral SI injections with long-lasting relief.  Following with orthopedics for knee injections which have provided relief. Previously unable to do formal course of physical therapy secondary to difficulty with caregiving responsibilities.  Presenting at today's office visit for follow-up.  Continuing to receive relief from her most recent SI joint injections.  Currently experiencing an increase in lumbar radicular symptoms as well as cervical pain radiating to her right shoulder and right upper extremity.  She noticed an increase in right sided symptoms after recent fall slipping on ice.  She is also experiencing an increase in right knee pain.  She did have recent gel injections for bilateral knee pain which provided relief until she fell.  She was evaluated by orthopedics for right shoulder pain and was given a corticosteroid injection which provided limited relief.  She was then sent to PM&R for evaluation of her cervical symptoms.  Sent for formal course of physical therapy targeting cervical and lumbar symptoms.  Recently started physical therapy and is planning  to continue.  She has also been scheduled for trigger point injections targeting cervical myofascial pain.  Trigger point injection scheduled with PM&R on 2/23/25.  She continues to manage her pain with Lyrica, duloxetine and Skelaxin. Recommended that she may continue to supplement with Tylenol, however, limit her total Tylenol dose to 3000 mg in 24 hours. Advised patient that she may repeat her SI joint injections every 3 to 4 months if needed.  The patient will follow-up in 6 months or sooner if needed.    -Patient will proceed with trigger point injections targeting cervical myofascial symptoms with PM&R.  -Patient may repeat bilateral SI joint injections every 3 to 4 months as needed.  Currently receiving relief from her most recent injection.    -Advised patient to continue physical therapy.    -We will continue pregabalin 200 mg 3 times daily as needed.  Patient feels that this medication has been very beneficial and continues to receive excellent relief without adverse effects.  -We will continue duloxetine 90 mg daily.  Per patient her psychiatrist is in agreement with this dose and would like us to take over prescribing this medication.  -Continue Skelaxin for muscle tightness and spasm.  -Patient may continue to supplement with Tylenol; advised to limit total Tylenol dose to less than 3000 mg in 24 hours.    -She may supplement with lidocaine patches and Voltaren gel as needed.  -Due to the patient being on multiple medications that may affect serotonin we will watch closely for serotonin syndrome.  Currently denies any symptoms consistent with serotonin syndrome.  -Given the patient's report of fibromyalgia pain  I discussed with her in detail the recommendations most current with the [employer] in the treatment of fibromyalgia.  This includes most recent research showing that the most effective treatment for fibromyalgia is physical exercise.  Cognitive behavioral therapy has also  been shown to have positive effects towards pain and other symptoms related to fibromyalgia.  Adjuvant treatments including acupuncture chiropractic and massage therapy have also been beneficial.  Specifically related to medication the FDA has approved few medications directly for the treatment of fibromyalgia and these include Cymbalta and Savella as well as older medications including Elavil.  Medications outside of the serotonin and norepinephrine realms include muscle relaxants as well as Lyrica and gabapentin.  Most importantly the College's recommendation is to avoid opiate narcotic medication for treating fibromyalgia the evidence shows at these drugs are not helpful to most people with fibromyalgia and in fact will cause greater pain sensitivity or make pain persistent    -Patient will follow-up in 6 months or sooner if needed.             Sacroiliitis (CMS-HCC) M46.1    Relevant Medications    pregabalin (Lyrica) 200 mg capsule     Other Visit Diagnoses         Codes    Lumbar radicular pain     M54.16    Relevant Medications    pregabalin (Lyrica) 200 mg capsule    Muscle spasm     M62.838    Relevant Medications    metaxalone (Skelaxin) 800 mg tablet

## 2025-02-13 NOTE — ASSESSMENT & PLAN NOTE
60-year-old female with history of low back pain, sacroiliitis and fibromyalgia who has done very well with bilateral SI injections with long-lasting relief.  Following with orthopedics for knee injections which have provided relief. Previously unable to do formal course of physical therapy secondary to difficulty with caregiving responsibilities.  Presenting at today's office visit for follow-up.  Continuing to receive relief from her most recent SI joint injections.  Currently experiencing an increase in lumbar radicular symptoms as well as cervical pain radiating to her right shoulder and right upper extremity.  She noticed an increase in right sided symptoms after recent fall slipping on ice.  She is also experiencing an increase in right knee pain.  She did have recent gel injections for bilateral knee pain which provided relief until she fell.  She was evaluated by orthopedics for right shoulder pain and was given a corticosteroid injection which provided limited relief.  She was then sent to PM&R for evaluation of her cervical symptoms.  Sent for formal course of physical therapy targeting cervical and lumbar symptoms.  Recently started physical therapy and is planning to continue.  She has also been scheduled for trigger point injections targeting cervical myofascial pain.  Trigger point injection scheduled with PM&R on 2/23/25.  She continues to manage her pain with Lyrica, duloxetine and Skelaxin. Recommended that she may continue to supplement with Tylenol, however, limit her total Tylenol dose to 3000 mg in 24 hours. Advised patient that she may repeat her SI joint injections every 3 to 4 months if needed.  The patient will follow-up in 6 months or sooner if needed.    -Patient will proceed with trigger point injections targeting cervical myofascial symptoms with PM&R.  -Patient may repeat bilateral SI joint injections every 3 to 4 months as needed.  Currently receiving relief from her most recent  injection.    -Advised patient to continue physical therapy.    -We will continue pregabalin 200 mg 3 times daily as needed.  Patient feels that this medication has been very beneficial and continues to receive excellent relief without adverse effects.  -We will continue duloxetine 90 mg daily.  Per patient her psychiatrist is in agreement with this dose and would like us to take over prescribing this medication.  -Continue Skelaxin for muscle tightness and spasm.  -Patient may continue to supplement with Tylenol; advised to limit total Tylenol dose to less than 3000 mg in 24 hours.    -She may supplement with lidocaine patches and Voltaren gel as needed.  -Due to the patient being on multiple medications that may affect serotonin we will watch closely for serotonin syndrome.  Currently denies any symptoms consistent with serotonin syndrome.  -Given the patient's report of fibromyalgia pain  I discussed with her in detail the recommendations most current with the [employer] in the treatment of fibromyalgia.  This includes most recent research showing that the most effective treatment for fibromyalgia is physical exercise.  Cognitive behavioral therapy has also been shown to have positive effects towards pain and other symptoms related to fibromyalgia.  Adjuvant treatments including acupuncture chiropractic and massage therapy have also been beneficial.  Specifically related to medication the FDA has approved few medications directly for the treatment of fibromyalgia and these include Cymbalta and Savella as well as older medications including Elavil.  Medications outside of the serotonin and norepinephrine realms include muscle relaxants as well as Lyrica and gabapentin.  Most importantly the College's recommendation is to avoid opiate narcotic medication for treating fibromyalgia the evidence shows at these drugs are not helpful to most people with fibromyalgia and in fact will cause greater pain  sensitivity or make pain persistent    -Patient will follow-up in 6 months or sooner if needed.

## 2025-02-15 PROCEDURE — RXMED WILLOW AMBULATORY MEDICATION CHARGE

## 2025-02-19 ENCOUNTER — PHARMACY VISIT (OUTPATIENT)
Dept: PHARMACY | Facility: CLINIC | Age: 60
End: 2025-02-19
Payer: COMMERCIAL

## 2025-02-20 ENCOUNTER — OFFICE VISIT (OUTPATIENT)
Facility: HOSPITAL | Age: 60
End: 2025-02-20
Payer: COMMERCIAL

## 2025-02-20 VITALS — SYSTOLIC BLOOD PRESSURE: 141 MMHG | HEART RATE: 83 BPM | RESPIRATION RATE: 20 BRPM | DIASTOLIC BLOOD PRESSURE: 78 MMHG

## 2025-02-20 DIAGNOSIS — M54.12 CERVICAL RADICULAR PAIN: ICD-10-CM

## 2025-02-20 DIAGNOSIS — M79.18 CERVICAL MYOFASCIAL PAIN SYNDROME: Primary | ICD-10-CM

## 2025-02-20 DIAGNOSIS — M54.12 CERVICAL RADICULOPATHY: ICD-10-CM

## 2025-02-20 PROCEDURE — 3077F SYST BP >= 140 MM HG: CPT | Performed by: PHYSICAL MEDICINE & REHABILITATION

## 2025-02-20 PROCEDURE — 2500000004 HC RX 250 GENERAL PHARMACY W/ HCPCS (ALT 636 FOR OP/ED): Performed by: PHYSICAL MEDICINE & REHABILITATION

## 2025-02-20 PROCEDURE — 99214 OFFICE O/P EST MOD 30 MIN: CPT | Performed by: PHYSICAL MEDICINE & REHABILITATION

## 2025-02-20 PROCEDURE — 3078F DIAST BP <80 MM HG: CPT | Performed by: PHYSICAL MEDICINE & REHABILITATION

## 2025-02-20 PROCEDURE — 3044F HG A1C LEVEL LT 7.0%: CPT | Performed by: PHYSICAL MEDICINE & REHABILITATION

## 2025-02-20 PROCEDURE — 3048F LDL-C <100 MG/DL: CPT | Performed by: PHYSICAL MEDICINE & REHABILITATION

## 2025-02-20 PROCEDURE — G2211 COMPLEX E/M VISIT ADD ON: HCPCS | Performed by: PHYSICAL MEDICINE & REHABILITATION

## 2025-02-20 RX ORDER — CALCITRIOL 0.5 UG/1
0.5 CAPSULE ORAL DAILY
COMMUNITY

## 2025-02-20 RX ORDER — DIAZEPAM 2 MG/1
2 TABLET ORAL ONCE
Qty: 2 TABLET | Refills: 0 | Status: SHIPPED | OUTPATIENT
Start: 2025-02-20 | End: 2025-02-20

## 2025-02-20 RX ORDER — LIDOCAINE HYDROCHLORIDE 10 MG/ML
5 INJECTION, SOLUTION INFILTRATION; PERINEURAL ONCE
Status: COMPLETED | OUTPATIENT
Start: 2025-02-20 | End: 2025-02-20

## 2025-02-20 RX ADMIN — LIDOCAINE HYDROCHLORIDE 5 ML: 10 INJECTION, SOLUTION INFILTRATION; PERINEURAL at 18:03

## 2025-02-20 ASSESSMENT — PAIN SCALES - GENERAL: PAINLEVEL_OUTOF10: 3

## 2025-02-20 NOTE — PROGRESS NOTES
Physical Medicine and Rehabilitation [facility]    02/20/25    Chief Complaint:  Neck Pain   Referred by ortho    HPI:  Paola Manley is a 60 y.o. old R handed female who presents to the clinic today at the request of ortho for evaluation of neck pain.  Onset: about 4 weeks ago, turned her neck felt something  Tried new pillow etc    Location: R upper trap and down the arm and in the neck  Radiation: tingling down the R arm, to all fingers  Quality: tingling burning  Duration: comes and goes  Aggravating factors:  cross body bag, in front  the computer  Alleviating factors: c  Severity: 4  Numbness/Tingling: Yes   Bowel or bladder incontinence: No   Current medication regimen: lyrica 200 mg tid for fibromyalgia and back pain,and Cymbalta   Feels weaker  Treatment to date:  Physical therapy: No   Medications taken to date for this complaint include the following:   As above    Voltaren gel has not tried    No balance issues,     Prior injections: Yes   R subacromial steroid injection 12/2024 w ortho; 20% relief  7.2024 bilateral SI joint injection. About every 6 months; fluero w pain management       Follows w pain management Dr. Brown for L spine pain.    She was last seen in jan 2024. At that time we discussed:    1. Xr C spine  2. Pt for core centralization, hep  3. Already on lyrical 200 mg tid, cymbalta and skelexin from pain management, can't do oral nsaids due to ho of UC  4. Trial Voltaren gel prn, lidocaine patch prn, alternate between ice/heat  5. Will bring back for R upper trap trigger point injections  6. If minimalrelief will order rmri and refer for cervical anthony    PT helps but still pain at the end of the day. Traction helps. ROM exercises help.   Pain goes down to the upper R arm.,    [facility]. Voltaren gel and lidocaine path w short term relief.      Imaging  Reviewed w patient and personally 02/20/25  Xr c spine 1/2025  FINDINGS:  Mild multilevel cervical degenerative change with  diffuse facet  arthrosis and disc disease slightly more prominent C4-5. Alignment  normal.      IMPRESSION:  Mild multilevel cervical degenerative change.  Ct cervical 4/2023  Contiguous axial images of the cervical spine were obtained without  intravenous contrast. Coronal and sagittal reformatted images were  obtained from the axial images.     FINDINGS:  No evidence acute fracture of the cervical spine. There is  straightening of the normal cervical lordosis. There is degenerative  change of the cervical spine with mild multilevel intervertebral disc  space narrowing and mild anterior osseous spurring. There is limited  evaluation of the soft tissues of the spinal canal. There is mild  multilevel posterior osseous spurring and disc protrusion. No  significant prevertebral soft tissue edema.      Impression   No evidence of acute fracture of the cervical spine.     Straightening of the normal cervical lordosis which may be secondary  to patient positioning or muscle spasm.     Mild degenerative change of the cervical spine.     Xr R shoulder 0212/2204  FINDINGS:  No osseous, articular, or soft tissue abnormality.      IMPRESSION:  Normal radiographs right shoulder.    Past Medical History:   Diagnosis Date    Depression, unspecified     Depressive disorder    Medial meniscus tear 04/24/2023    Ocular rosacea     Personal history of other diseases of the digestive system 01/12/2016    History of esophageal reflux    Personal history of other diseases of the respiratory system 08/13/2018    History of acute sinusitis    Personal history of other endocrine, nutritional and metabolic disease     History of hypothyroidism    Personal history of other specified conditions     History of insomnia    Prediabetes     Prediabetes    Sacro-iliac pain     Tear of meniscus of right knee 04/24/2023    Ulcerative colitis         Past Surgical History:   Procedure Laterality Date    APPENDECTOMY  01/12/2016    Appendectomy     CHOLECYSTECTOMY  01/12/2016    Cholecystectomy    HERNIA REPAIR  01/12/2016    Inguinal Hernia Repair    HYSTERECTOMY  11/07/2017    Hysterectomy    MR HEAD ANGIO WO IV CONTRAST  02/06/2019    MR HEAD ANGIO WO IV CONTRAST 2/6/2019 [facility]    MR NECK ANGIO WO IV CONTRAST  02/06/2019    MR NECK ANGIO WO IV CONTRAST 2/6/2019 [facility]    OTHER SURGICAL HISTORY  08/12/2020    Knee arthroscopy    PANCREAS SURGERY      SINUS SURGERY  01/12/2016    Sinus Surgery    SPLENECTOMY, TOTAL  01/12/2016    Splenectomy    TOTAL ABDOMINAL HYSTERECTOMY W/ BILATERAL SALPINGOOPHORECTOMY  01/12/2016    Total Abdominal Hysterectomy With Removal Of Both Ovaries        Patient Active Problem List    Diagnosis Date Noted    Sacroiliitis (CMS-HCC) 12/13/2023    Colon adenomas 11/21/2023    Chronic idiopathic constipation 11/21/2023    Renal calculi 10/28/2023    Allergic rhinitis 04/24/2023    Anemia 04/24/2023    Anxiety 04/24/2023    B12 deficiency 04/24/2023    Fibromyalgia 04/24/2023    Ulcerative colitis 04/24/2023    Depression 04/24/2023    Diabetes mellitus type 2, uncomplicated (Multi) 04/24/2023    Arthralgia 04/24/2023    GERD (gastroesophageal reflux disease) 04/24/2023    Hyperlipidemia 04/24/2023    Hypothyroidism 04/24/2023    Obesity 04/24/2023    Spondylosis of lumbosacral region 04/24/2023    Vitamin D deficiency 04/24/2023    Weight gain 04/24/2023        Family History   Problem Relation Name Age of Onset    Arthritis Mother      Heart disease Mother      Other (CVA) Mother      Coronary artery disease Mother      Diabetes Mother      Osteoporosis Mother      Heart failure Mother      Kidney failure Mother      Alcohol abuse Father      Hypertension Father      Lung cancer Father      Diabetes Maternal Grandmother      Liver cancer Maternal Grandmother      Throat cancer Maternal Grandfather      Osteoarthritis Other          Grandmother        Current Outpatient Medications   Medication Sig  Dispense Refill    azelastine (Astelin) 137 mcg (0.1 %) nasal spray Administer 1 spray into each nostril 2 times a day. 30 mL 3    buPROPion XL (Wellbutrin XL) 150 mg 24 hr tablet Take 1 tablet (150 mg) by mouth once daily in the morning. Take before meals.      calcitriol (Rocaltrol) 0.5 mcg capsule Take 1 capsule (0.5 mcg) by mouth once daily.      cloNIDine (Catapres) 0.2 mg tablet TAKE ONE (1) TABLET AT BEDTIME      cyanocobalamin (Vitamin B-12) 1,000 mcg/mL injection Inject 1 mL (1,000 mcg) into the muscle every 30 (thirty) days. 1 mL 11    diclofenac sodium (Voltaren) 1 % gel Apply 4.5 inches (4 g) topically 4 times a day. Apply 2-4 gm to affected area 100 g 1    doxycycline (Adoxa) 150 mg tablet Take 1 tablet (150 mg) by mouth once daily.      DULoxetine (Cymbalta) 30 mg DR capsule Take 1 capsule (30 mg) by mouth once daily. To be taken in combination with duloxetine 60 mg daily for a total of 90 mg daily 30 capsule 5    DULoxetine (Cymbalta) 60 mg DR capsule Take 1 capsule (60 mg) by mouth once daily. To be taken in combination with duloxetine 30 mg daily for total dose of 90 mg daily 30 capsule 5    fluticasone (Flonase) 50 mcg/actuation nasal spray Administer 1 spray into each nostril 2 times a day. 18.2 g 3    levothyroxine (Synthroid, Levoxyl) 88 mcg tablet Take 1 tablet (88 mcg) by mouth early in the morning.. Please take by mouth every other day in an alternating fashion opposite of the 100 mcg 90 tablet 0    linaCLOtide (Linzess) 290 mcg capsule Take 1 capsule (290 mcg) by mouth once daily in the morning. Take before meals. DO NOT CRUSH OR CHEW 30 capsule 4    melatonin 10 mg capsule TAKE 1 CAPSULE Bedtime PRN      metaxalone (Skelaxin) 800 mg tablet TAKE ONE-HALF (1/2) TABLET TWO (2) TIMES A DAY AS NEEDED FOR MUSCLE SPASM 30 tablet 2    pantoprazole (ProtoNix) 40 mg EC tablet Take 1 tablet (40 mg) by mouth once daily. 90 tablet 1    pregabalin (Lyrica) 200 mg capsule Take 1 capsule (200 mg) by mouth  3 times a day. 90 capsule 5    rosuvastatin (Crestor) 10 mg tablet Take 1 tablet (10 mg) by mouth once daily. 90 tablet 1    tirzepatide (Mounjaro) 15 mg/0.5 mL pen injector Inject 15 mg under the skin 1 (one) time per week. 6 mL 1    triamcinolone (Kenalog) 0.1 % cream APPLY TO LEGS TWO (2) TIMES A DAY AS NEEDED      Vyvanse 20 mg capsule Take 1 capsule (20 mg) by mouth once daily. Take in the morning      diclofenac sodium (Voltaren) 1 % gel Apply 4.5 inches (4 g) topically 4 times a day as needed (for pain). (Patient not taking: Reported on 2/20/2025) 100 g 2     No current facility-administered medications for this visit.        Allergies   Allergen Reactions    Bactrim [Sulfamethoxazole-Trimethoprim] GI Upset    Cephalexin GI Upset     GASTROINTESTIAL IRRITATION    Ciprofloxacin GI Upset     Reaction from Community: Other(See Desc)    Darvocet A500 [Propoxyphene N-Acetaminophen] Itching    Nsaids (Non-Steroidal Anti-Inflammatory Drug) Other     Hx of ulcerative colitis    Vicodin [Hydrocodone-Acetaminophen] Itching        Social History     Socioeconomic History    Marital status:    Tobacco Use    Smoking status: Every Day     Types: Cigarettes    Smokeless tobacco: Never   Vaping Use    Vaping status: Never Used   Substance and Sexual Activity    Alcohol use: Not Currently     Comment: has not drank in 15 years due to medication    Drug use: Never   Lives w  mom and son  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  Smokes 4-5 cig/day  No alcohol  No drug use       Review of Systems  Constitutional:  Denies fever or chills   Eyes:  Denies change in visual acuity   HENT:  Denies nasal congestion or sore throat   Respiratory:  Denies cough or shortness of breath   Cardiovascular:  Denies chest pain or edema   GI:  Denies abdominal pain, nausea, vomiting, bloody stools or diarrhea   :  Denies dysuria   Integument:  Denies rash   Neurologic: as per HPI  MSK: Per above HPI  Endocrine:  Denies polyuria or polydipsia    Lymphatic:  Denies swollen glands   Psychiatric:  Denies depression or anxiety         Physical Exam:  /78 (BP Location: Left arm, Patient Position: Sitting)   Pulse 83   Resp 20     General:  NAD, F    Psychiatric: appropriate mood & affect.   Cardiovascular:  Normal radial pulses; no UE  edema.  Respiratory:  Normal rate; unlabored breathing.  Skin:  Intact; no erythema; no ecchymosis or rash.  Lymphatic:  No lymphadenopathy or lymphedema.  NEURO:  Alert and appropriate.  Speech fluent, conversing appropriately.   Motor:    Rt: SA 5/5, SER 5/5, EE 5/5, EF 5/5, WE 5/5, FDIM 5/5, ADM 5/5    Lt: SA 5/5, SER 5/5, EE 5/5, EF 5/5, WE 5/5, FDIM 5/5, ADM 5/5    Rt: HF 5/5    Lt: HF 5/5  Sensation:     Light touch intact in the b/l C5-T2 dermatomes.     PP: intact in the b/l C5-T2 dermatomes.  Reflexes:     Right: Biceps 2+, brachioradialis 2+, triceps 2+, patellar 2+, achilles 2+    Left: Biceps 2+, brachioradialis 2+, triceps 2+, patellar 2+, achilles 2+     Babinski's downgoing; no clonus     Hoffmans: negative bilateral   Gait: Normal, narrow based gait.  Tandem gait WNL.    MSK:  Inspection of the neck reveals no soft tissue swelling or ecchymoses.   Cervical flexion full; cervical extension to 10°, right rotation 20°; left rotation 20°.  There is tenderness over the R upper trap.   Special Tests:    Spurling's maneuver:+      Bakody's sign: positive           Impression:  Paola Manley is a 60 y.o. F w pmh of chronic lower back, sacroiliac joint dysfunction, fibromyalgia, and DM presenting with R arm pain due to cervical radiculopathy and secondary R upper trap and levator scapulai myofascial pain.       Plan:     1. Xr C spine demonstrates facet arthropathy, overall preserved disc spaces  2.  Continue Pt for core centralization, hep  3. Already on lyrical 200 mg tid, cymbalta and skelexin from pain management, can't do oral nsaids due to ho of UC  4. Trial Voltaren gel prn, lidocaine patch prn, alternate  between ice/heat  5.  R upper trap trigger point injections procedure below  6.mri c spine, interested in cervical anthony in future    Fu 8 weeks    The risks (bleeding, infection, pneumothorax, soft tissue damage) and benefits (decreased pain, improved function) were explained to the patient and verbal consent to proceed with the injection was obtained.  The patient's identifiers were confirmed with a timeout, including name and date of birth (and that no heat source or antibiotics were utilized with this procedure). The trigger points were identified in the r upper trap and the area was prepped with Chloraprep.  A 25-gauge 1.5 inch needle was advanced into the muscle and 1 cc was of solution was injected into each trigger point.  A [  5]cc solution consisting of 1% lidocaine and  was divided between [ 5] total trigger points.  The procedure was well tolerated, there were no apparent complications, and the patient was instructed on post injection care.      Lisbet Hilliard MD  Physical Medicine and Rehabilitation

## 2025-02-26 ENCOUNTER — HOSPITAL ENCOUNTER (OUTPATIENT)
Dept: RADIOLOGY | Facility: HOSPITAL | Age: 60
Discharge: HOME | End: 2025-02-26
Payer: COMMERCIAL

## 2025-02-26 DIAGNOSIS — Z78.0 POST-MENOPAUSAL: ICD-10-CM

## 2025-02-26 DIAGNOSIS — E78.2 MIXED HYPERLIPIDEMIA: ICD-10-CM

## 2025-02-26 DIAGNOSIS — E11.9 TYPE 2 DIABETES MELLITUS WITHOUT COMPLICATION, WITHOUT LONG-TERM CURRENT USE OF INSULIN (MULTI): ICD-10-CM

## 2025-02-26 PROCEDURE — 71271 CT THORAX LUNG CANCER SCR C-: CPT

## 2025-02-26 PROCEDURE — 71271 CT THORAX LUNG CANCER SCR C-: CPT | Performed by: RADIOLOGY

## 2025-02-26 PROCEDURE — 77080 DXA BONE DENSITY AXIAL: CPT

## 2025-02-26 PROCEDURE — 77080 DXA BONE DENSITY AXIAL: CPT | Performed by: STUDENT IN AN ORGANIZED HEALTH CARE EDUCATION/TRAINING PROGRAM

## 2025-02-26 PROCEDURE — 75571 CT HRT W/O DYE W/CA TEST: CPT

## 2025-02-26 NOTE — RESULT ENCOUNTER NOTE
CT cardiac score shows a value of 1 which places the patient in the lowest restratification for any cardiovascular disease    There were also noncalcified pulmonary nodules that were less than 6 mm in size  No further follow-up is required but if the patient does have any history of smoking, it may be wise to follow-up in 1 year    Please advise

## 2025-02-27 NOTE — RESULT ENCOUNTER NOTE
CT low-dose screening for lung cancer shows a few small nodules    We will continue screening every year

## 2025-03-05 ENCOUNTER — TELEPHONE (OUTPATIENT)
Dept: PAIN MEDICINE | Facility: HOSPITAL | Age: 60
End: 2025-03-05
Payer: COMMERCIAL

## 2025-03-05 NOTE — TELEPHONE ENCOUNTER
Patient calling to schedule Bilateral SI. Per note she can have every 3-4 months. If this is ok to schedule please put the order in. Thank you

## 2025-03-06 ENCOUNTER — TELEPHONE (OUTPATIENT)
Dept: PAIN MEDICINE | Facility: HOSPITAL | Age: 60
End: 2025-03-06
Payer: COMMERCIAL

## 2025-03-06 ENCOUNTER — PREP FOR PROCEDURE (OUTPATIENT)
Dept: PAIN MEDICINE | Facility: HOSPITAL | Age: 60
End: 2025-03-06
Payer: COMMERCIAL

## 2025-03-06 DIAGNOSIS — M46.1 SACROILIITIS (CMS-HCC): Primary | ICD-10-CM

## 2025-03-06 RX ORDER — DIAZEPAM 5 MG/1
5 TABLET ORAL ONCE AS NEEDED
OUTPATIENT
Start: 2025-03-06

## 2025-03-06 NOTE — TELEPHONE ENCOUNTER
Okay to repeat bilateral SI joint injections.  Patient's last injection 7/12/2024 which provided significant/sustained relief for up to 8 months.  Order placed for bilateral SI joint injections.  Will have nursing reach out to patient for education.

## 2025-03-06 NOTE — H&P
Patient called stating that SI pain has returned.  She has done well with bilateral SI injections in the past with her last injection on 7/12/2024 providing up to 8 months of relief.  We will schedule patient to repeat bilateral SI injections.  Will have nursing reach out to do education.

## 2025-03-06 NOTE — TELEPHONE ENCOUNTER
Pt called back and pre and post instructions for injection were reviewed with patient.  Pt verbalizes compliance and understanding of pre and post injection instructions.    Pt denies taking blood thinners.  Denies having upcoming vaccines at this time.  Denies contrast/dye allergy.  Donna Mas RN

## 2025-03-12 ENCOUNTER — HOSPITAL ENCOUNTER (OUTPATIENT)
Dept: RADIOLOGY | Facility: CLINIC | Age: 60
Discharge: HOME | End: 2025-03-12
Payer: COMMERCIAL

## 2025-03-12 DIAGNOSIS — M79.18 CERVICAL MYOFASCIAL PAIN SYNDROME: ICD-10-CM

## 2025-03-12 DIAGNOSIS — M54.12 CERVICAL RADICULAR PAIN: ICD-10-CM

## 2025-03-12 DIAGNOSIS — M54.12 CERVICAL RADICULOPATHY: ICD-10-CM

## 2025-03-12 PROCEDURE — RXMED WILLOW AMBULATORY MEDICATION CHARGE

## 2025-03-12 PROCEDURE — 72141 MRI NECK SPINE W/O DYE: CPT | Performed by: RADIOLOGY

## 2025-03-12 PROCEDURE — 72141 MRI NECK SPINE W/O DYE: CPT

## 2025-03-20 ENCOUNTER — APPOINTMENT (OUTPATIENT)
Facility: HOSPITAL | Age: 60
End: 2025-03-20
Payer: COMMERCIAL

## 2025-03-21 ENCOUNTER — HOSPITAL ENCOUNTER (OUTPATIENT)
Dept: GASTROENTEROLOGY | Facility: HOSPITAL | Age: 60
Discharge: HOME | End: 2025-03-21
Payer: COMMERCIAL

## 2025-03-21 ENCOUNTER — PHARMACY VISIT (OUTPATIENT)
Dept: PHARMACY | Facility: CLINIC | Age: 60
End: 2025-03-21
Payer: COMMERCIAL

## 2025-03-21 VITALS
TEMPERATURE: 98.1 F | OXYGEN SATURATION: 97 % | SYSTOLIC BLOOD PRESSURE: 120 MMHG | HEART RATE: 71 BPM | DIASTOLIC BLOOD PRESSURE: 75 MMHG | RESPIRATION RATE: 16 BRPM

## 2025-03-21 DIAGNOSIS — M46.1 SACROILIITIS (CMS-HCC): ICD-10-CM

## 2025-03-21 PROCEDURE — 2550000001 HC RX 255 CONTRASTS: Performed by: PHYSICAL MEDICINE & REHABILITATION

## 2025-03-21 PROCEDURE — 27096 INJECT SACROILIAC JOINT: CPT | Mod: 50 | Performed by: PHYSICAL MEDICINE & REHABILITATION

## 2025-03-21 PROCEDURE — 2500000004 HC RX 250 GENERAL PHARMACY W/ HCPCS (ALT 636 FOR OP/ED): Performed by: PHYSICAL MEDICINE & REHABILITATION

## 2025-03-21 RX ORDER — METHYLPREDNISOLONE ACETATE 40 MG/ML
INJECTION, SUSPENSION INTRA-ARTICULAR; INTRALESIONAL; INTRAMUSCULAR; SOFT TISSUE AS NEEDED
Status: COMPLETED | OUTPATIENT
Start: 2025-03-21 | End: 2025-03-21

## 2025-03-21 RX ORDER — LIDOCAINE HYDROCHLORIDE 5 MG/ML
INJECTION, SOLUTION INFILTRATION; INTRAVENOUS AS NEEDED
Status: COMPLETED | OUTPATIENT
Start: 2025-03-21 | End: 2025-03-21

## 2025-03-21 RX ORDER — HYDROXYZINE PAMOATE 25 MG/1
25 CAPSULE ORAL 3 TIMES DAILY PRN
COMMUNITY
Start: 2025-03-07

## 2025-03-21 RX ADMIN — LIDOCAINE HYDROCHLORIDE 15 ML: 5 INJECTION, SOLUTION INFILTRATION at 09:57

## 2025-03-21 RX ADMIN — METHYLPREDNISOLONE ACETATE 40 MG: 40 INJECTION, SUSPENSION INTRA-ARTICULAR; INTRALESIONAL; INTRAMUSCULAR; SOFT TISSUE at 09:59

## 2025-03-21 RX ADMIN — IOHEXOL 5 ML: 350 INJECTION, SOLUTION INTRAVENOUS at 09:58

## 2025-03-21 ASSESSMENT — PAIN - FUNCTIONAL ASSESSMENT
PAIN_FUNCTIONAL_ASSESSMENT: 0-10
PAIN_FUNCTIONAL_ASSESSMENT: 0-10

## 2025-03-21 ASSESSMENT — PATIENT HEALTH QUESTIONNAIRE - PHQ9
2. FEELING DOWN, DEPRESSED OR HOPELESS: NOT AT ALL
1. LITTLE INTEREST OR PLEASURE IN DOING THINGS: NOT AT ALL
SUM OF ALL RESPONSES TO PHQ9 QUESTIONS 1 AND 2: 0

## 2025-03-21 ASSESSMENT — PAIN SCALES - GENERAL
PAINLEVEL_OUTOF10: 5 - MODERATE PAIN
PAINLEVEL_OUTOF10: 4

## 2025-03-21 NOTE — H&P
HISTORY AND PHYSICAL    History Of Present Illness  Paola Manley is a 60 y.o. female presenting with chronic pain.  Here for Bilateral sacroiliac joint injection    she denies any recent antibiotic use or infections, she denies any blood thinner use , and she denies contrast or local anesthetic allergies     Past Medical History  Past Medical History:   Diagnosis Date    Depression, unspecified     Depressive disorder    Medial meniscus tear 04/24/2023    Ocular rosacea     Personal history of other diseases of the digestive system 01/12/2016    History of esophageal reflux    Personal history of other diseases of the respiratory system 08/13/2018    History of acute sinusitis    Personal history of other endocrine, nutritional and metabolic disease     History of hypothyroidism    Personal history of other specified conditions     History of insomnia    Prediabetes     Prediabetes    Sacro-iliac pain     Tear of meniscus of right knee 04/24/2023    Ulcerative colitis        Surgical History  Past Surgical History:   Procedure Laterality Date    APPENDECTOMY  01/12/2016    Appendectomy    CHOLECYSTECTOMY  01/12/2016    Cholecystectomy    HERNIA REPAIR  01/12/2016    Inguinal Hernia Repair    HYSTERECTOMY  11/07/2017    Hysterectomy    MR HEAD ANGIO WO IV CONTRAST  02/06/2019    MR HEAD ANGIO WO IV CONTRAST 2/6/2019 [facility]    MR NECK ANGIO WO IV CONTRAST  02/06/2019    MR NECK ANGIO WO IV CONTRAST 2/6/2019 [facility]    OTHER SURGICAL HISTORY  08/12/2020    Knee arthroscopy    PANCREAS SURGERY      SINUS SURGERY  01/12/2016    Sinus Surgery    SPLENECTOMY, TOTAL  01/12/2016    Splenectomy    TOTAL ABDOMINAL HYSTERECTOMY W/ BILATERAL SALPINGOOPHORECTOMY  01/12/2016    Total Abdominal Hysterectomy With Removal Of Both Ovaries        Social History  She reports that she has been smoking cigarettes. She has never used smokeless tobacco. She reports that she does not drink alcohol and does not use  drugs.    Family History  Family History   Problem Relation Name Age of Onset    Arthritis Mother      Heart disease Mother      Other (CVA) Mother      Coronary artery disease Mother      Diabetes Mother      Osteoporosis Mother      Heart failure Mother      Kidney failure Mother      Alcohol abuse Father      Hypertension Father      Lung cancer Father      Diabetes Maternal Grandmother      Liver cancer Maternal Grandmother      Throat cancer Maternal Grandfather      Osteoarthritis Other          Grandmother        Allergies  Bactrim [sulfamethoxazole-trimethoprim], Cephalexin, Ciprofloxacin, Darvocet a500 [propoxyphene n-acetaminophen], Nsaids (non-steroidal anti-inflammatory drug), and Vicodin [hydrocodone-acetaminophen]    Review of Systems   12 point ROS done and negative except for the above.   Physical Exam     General: NAD, well groomed, well nourished  Eyes: Non-icteric sclera, EOMI  Ears, Nose, Mouth, and Throat: External ears and nose appear to be without deformity or rash. No lesions or masses noted. Hearing is grossly intact.   Neck: Trachea midline  Respiratory: Nonlabored breathing   Cardiovascular: No peripheral edema   Skin: No rashes or open lesions/ulcers identified on skin.    Last Recorded Vitals  Blood pressure 133/83, pulse 78, temperature 36.7 °C (98.1 °F), temperature source Temporal, resp. rate 16, SpO2 97%.    Relevant Results           Assessment/Plan       Risks, benefits, alternatives discussed. All questions answered to the best of my ability. Patient agrees to proceed.   -We will proceed with planned procedure

## 2025-03-21 NOTE — DISCHARGE INSTRUCTIONS
DISCHARGE INSTRUCTIONS FOR INJECTIONS     After most injections, it is recommended that you relax and limit your activity for the remainder of the day unless you have been told otherwise by your pain physician.  You should not drive a car, operate machinery, or make important legal decisions unless otherwise directed by your pain physician.  You may resume your normal activity, including exercise, tomorrow.      Keep a written pain diary of how much pain relief you experienced following the injection procedure and the length of time of pain relief you experienced pain relief. Following diagnostic injections like medial branch nerve blocks, genicular nerve blocks, sacroiliac joint blocks, stellate ganglion injections and other blocks, it is very important you record the specific amount of pain relief you experienced immediately after the injection and how long it lasted. If you have been given Questionnaire paperwork to fill out out after your diagnostic block please call [phone] and leave a detailed voicemail with the answers to the questions you were given. This information is vital to getting approval for next steps.    Observe the needle site for excessive bleeding (slow general oozing that completely soaks the dressing or fresh bright red bleeding).  In either case, apply pressure to the area, elevate it if possible and call your doctor at once.      For all injections, please keep the injection site dry and inspect the site for a couple of days. You may remove the Band-Aid the day of the injection at any time.     Keep the needle site clean & dry for 24 hours.   no soaking baths, hot tubs, whirlpools or swimming pools for 2-3 days.     Some discomfort, bruising or slight swelling may occur at the injection site. This is not abnormal if it occurs.  If needed you may:    -Take over the counter medication such as Tylenol or Motrin.   -Apply an ice pack for 30 minutes, 2 to 3 times a day for the first 24  hours.     If you are given steroids in your injection, your pain may not be gone immediately after this procedure. It generally takes 3-5 days for the steroid to work but it can take up to 2 weeks. may You may notice a worsening of your symptoms for 1-2 days after the injection. This is not abnormal.  You may use acetaminophen, ibuprofen, or prescription medication that your doctor may have prescribed for you if you need to do so.     A few common side effects of steroids include facial flushing, sweating, restlessness, irritability,difficulty sleeping, increase in blood sugar, and increased blood pressure. If you have diabetes, please monitor your blood sugar at least once a day for at least 5 days. If you have poorly controlled high blood pressure, monitoryour blood pressure for at least 2 days and contact your primary care physician if these numbers are unusually high for you.        Call  Pain Management at [phone] between 8am-4pm Monday - Friday if you are experiencing the following:    If you received an epidural or spinal injection:    -Headache that does not go away with medicine, is worse when sitting or standing up, and is greatly relieved upon lying down.   -Severe pain worse than or different than your baseline pain.   -Chills or fever (101º F or greater).   -Drainage or signs of infection at the injection site     Go directly to the Emergency Department if you are experiencing the following and received an epidural or spinal injection:   -Abrupt weakness or progressive weakness in your legs that starts after you leave the clinic.   -Abrupt severe or worsening numbness in your legs.   -Inability to urinate after the injection or loss of bowel or bladder control without the urge to defecate or urinate.     If you have a clinical question that cannot wait until your next appointment, please call [phone] between 8am-4pm Monday - Friday. We do our best to return all non-emergency messages within  24-48 hours, Monday - Friday. A nurse or physician will return your message. You may also try calling and they will do their best to answer your question(s):    [facility] nurse [phone]  [facility] Pain Management nurse [phone]  After hours pain management [phone]    If you need to cancel an appointment, please call the scheduling staff at [phone] during normal business hours or leave a message at least 24 hours in advance.

## 2025-03-28 ENCOUNTER — APPOINTMENT (OUTPATIENT)
Dept: ORTHOPEDIC SURGERY | Facility: CLINIC | Age: 60
End: 2025-03-28
Payer: COMMERCIAL

## 2025-03-28 ENCOUNTER — SPECIALTY PHARMACY (OUTPATIENT)
Dept: PHARMACY | Facility: CLINIC | Age: 60
End: 2025-03-28

## 2025-03-28 DIAGNOSIS — M17.0 ARTHRITIS OF BOTH KNEES: ICD-10-CM

## 2025-03-28 RX ORDER — HYALURONATE SODIUM, STABILIZED 60 MG/3 ML
SYRINGE (ML) INTRAARTICULAR
Qty: 6 ML | Refills: 0 | Status: SHIPPED | OUTPATIENT
Start: 2025-03-28

## 2025-03-28 RX ORDER — TRIAMCINOLONE ACETONIDE 40 MG/ML
1 INJECTION, SUSPENSION INTRA-ARTICULAR; INTRAMUSCULAR
Status: COMPLETED | OUTPATIENT
Start: 2025-03-28 | End: 2025-03-28

## 2025-03-28 RX ADMIN — TRIAMCINOLONE ACETONIDE 1 ML: 40 INJECTION, SUSPENSION INTRA-ARTICULAR; INTRAMUSCULAR at 08:36

## 2025-03-28 ASSESSMENT — PAIN - FUNCTIONAL ASSESSMENT: PAIN_FUNCTIONAL_ASSESSMENT: 0-10

## 2025-03-28 ASSESSMENT — PAIN SCALES - GENERAL: PAINLEVEL_OUTOF10: 4

## 2025-03-28 NOTE — PROGRESS NOTES
This is a consultation from Dr. Addy Storm DO for   Chief Complaint   Patient presents with    Right Knee - Pain       This is a 60 y.o. female who presents for follow-up for her right knee.  Patient had bilateral knee gel injections about 3 months ago, they were helpful and significantly improved her symptoms.  Left is actually still doing quite well.  She had a slip and fall on the ice a few weeks ago which aggravated her right knee.  Since then it has been exacerbated and worsening pain, sharp pain over the anterior medial knee.  Bothers her quite a bit.  She has used ice and taken over-the-counter anti-inflammatories.    Physical Exam    There has been no interval change in this patient's past medical, surgical, medications, allergies, family history or social history since the most recent visit to a provider within our department. 14 point review of systems was performed, reviewed, and negative except for pertinent positives documented in the history of present illness.     Constitutional: well developed, well nourished female in no acute distress  Psychiatric: normal mood, appropriate affect  Eyes: sclera anicteric  HENT: normocephalic/atraumatic  CV: regular rate and rhythm   Respiratory: non labored breathing  Integumentary: no rash  Neurological: moves all extremities    Right knee exam: skin intact no lacerations or abrations. no effusion.  Tender medial joint line. negative log roll negative patellar grind. ROM 0-120. stable to varus and valgus stress at 0 and 30 degrees. negative lachman negative posterior drawer negative esha. 5/5 ehl/fhl/gs/ta. silt s/s/sp/dp/t. 2+ dp/pt        L Inj/Asp: R knee on 3/28/2025 8:36 AM  Indications: pain and joint swelling  Details: 22 G needle, anterolateral approach  Medications: 1 mL triamcinolone acetonide 40 mg/mL    Discussion:  I discussed the conservative treatment options for knee osteoarthritis including but not limited to physical therapy, oral  NSAIDS, activity and lifestyle modification, and corticosteroid injections. Pt has elected to undergo a cortisone injection today. I have explained the risk and benefits of an injection including the possibility of joint infection, bleeding, damage to cartilage, allergic reaction. Patient verbalized understanding and gave verbal consent wishes to proceed with a intra-articular cortisone injection for their knee.    Procedure:  After discussing the risk and benefits of the procedure, we proceeded with an intra-articular right knee injection. We discussed the risks and benefits and potential morbidity related to the treatment, and to the prescription medication administered in the injection    With the patient's informed verbal consent, the right knee was prepped in standard sterile fashion with Chlorhexidine. The skin was then anesthetized with ethyl chloride spray and cleaned again with Chlorhexidine. The knee was then apirated/injected with a prefilled 20-gauge syringe of 40 mg Kenalog + 4 ml Lidocaine using the lateral approach without complications.  The patient tolerated this well and felt immediate initial relief of symptoms. A bandaid was applied and the patient ambulated out of the clinic on ther own accord without difficulty. Patient was instructed to avoid physical activity for 24-48 hours to prevent the knees from swelling and may ice the knees as tolerated. Patient should contact the office if any signs of of infection appear: redness, fever, chills, drainage, swelling or warmth to the knees.  Pt understands that the injections can be repeated no sooner than 3 months.    Procedure, treatment alternatives, risks and benefits explained, specific risks discussed. Consent was given by the patient. Immediately prior to procedure a time out was called to verify the correct patient, procedure, equipment, support staff and site/side marked as required. Patient was prepped and draped in the usual sterile fashion.  "            Impression/Plan: This is a 60 y.o. female with right knee arthritis, aggravated by fall.  I had an in depth discussion with the patient regarding treatment options for arthritis and their relative risks and benefits. We reviewed surgical and nonsurgical option for treatment. Treatments include anti inflammatory medications, physical therapy, weight loss, activity modification, use of assistive devices, injection therapies. We discussed current prescriptions and risks and benefits of continuation of prescription medication as apporpriate. We discussed that arthritis is often progressive over time, an in end stage arthritis surgical interventions can be considered, including arthroplasty. All questions were answered and the patient voiced their understanding.  Cortisone today for the right knee, will discuss gel injections next time she is eligible.    BMI Readings from Last 1 Encounters:   03/12/25 32.69 kg/m²      Lab Results   Component Value Date    CREATININE 0.71 01/17/2025     Tobacco Use: High Risk (3/28/2025)    Patient History     Smoking Tobacco Use: Every Day     Smokeless Tobacco Use: Never     Passive Exposure: Not on file      Computed MELD 3.0 unavailable. One or more values for this score either were not found within the given timeframe or did not fit some other criterion.  Computed MELD-Na unavailable. One or more values for this score either were not found within the given timeframe or did not fit some other criterion.       Lab Results   Component Value Date    HGBA1C 5.3 01/17/2025     No results found for: \"STAPHMRSASCR\"  "

## 2025-04-10 ENCOUNTER — APPOINTMENT (OUTPATIENT)
Facility: HOSPITAL | Age: 60
End: 2025-04-10
Payer: COMMERCIAL

## 2025-04-10 VITALS — SYSTOLIC BLOOD PRESSURE: 142 MMHG | RESPIRATION RATE: 22 BRPM | DIASTOLIC BLOOD PRESSURE: 80 MMHG | HEART RATE: 89 BPM

## 2025-04-10 DIAGNOSIS — M79.18 CERVICAL MYOFASCIAL PAIN SYNDROME: ICD-10-CM

## 2025-04-10 DIAGNOSIS — M54.12 CERVICAL RADICULAR PAIN: Primary | ICD-10-CM

## 2025-04-10 PROCEDURE — 3079F DIAST BP 80-89 MM HG: CPT | Performed by: PHYSICAL MEDICINE & REHABILITATION

## 2025-04-10 PROCEDURE — 99214 OFFICE O/P EST MOD 30 MIN: CPT | Performed by: PHYSICAL MEDICINE & REHABILITATION

## 2025-04-10 PROCEDURE — 3077F SYST BP >= 140 MM HG: CPT | Performed by: PHYSICAL MEDICINE & REHABILITATION

## 2025-04-10 PROCEDURE — 3044F HG A1C LEVEL LT 7.0%: CPT | Performed by: PHYSICAL MEDICINE & REHABILITATION

## 2025-04-10 PROCEDURE — 3048F LDL-C <100 MG/DL: CPT | Performed by: PHYSICAL MEDICINE & REHABILITATION

## 2025-04-10 ASSESSMENT — PAIN SCALES - GENERAL: PAINLEVEL_OUTOF10: 3

## 2025-04-10 NOTE — PROGRESS NOTES
[facility]    04/10/25    Chief Complaint:  Neck Pain   Referred by ortho    HPI:  Paola Manley is a 60 y.o. old R handed female who presents to the clinic today at the request of ortho for evaluation of neck pain.  Onset: about 4 weeks ago, turned her neck felt something  Tried new pillow etc    Location: R upper trap and down the arm and in the neck  Radiation: tingling down the R arm, to all fingers  Quality: tingling burning  Duration: comes and goes  Aggravating factors:  cross body bag, in front  the computer  Alleviating factors: c  Severity: 4  Numbness/Tingling: Yes   Bowel or bladder incontinence: No   Current medication regimen: lyrica 200 mg tid for fibromyalgia and back pain,and Cymbalta   Feels weaker  Treatment to date:  Physical therapy: No   Medications taken to date for this complaint include the following:   As above    Voltaren gel has not tried    No balance issues,     Prior injections: Yes   R subacromial steroid injection 12/2024 w ortho; 20% relief  7.2024 bilateral SI joint injection. About every 6 months; fluero w pain management       Follows w pain management Dr. Brown for L spine pain.      PT helps but still pain at the end of the day. Traction helps. ROM exercises help.   Pain goes down to the upper R arm.,    [facility]; [facility]. Voltaren gel and lidocaine path w short term relief.    She was last seen in feb 2025. At that time we discussed:   1. Xr C spine demonstrates facet arthropathy, overall preserved disc spaces  2.  Continue Pt for core centralization, hep  3. Already on lyrical 200 mg tid, cymbalta and skelexin from pain management, can't do oral nsaids due to ho of UC  4. Trial Voltaren gel prn, lidocaine patch prn, alternate between ice/heat  5.  R upper trap trigger point injections procedure below  6.mri c spine, interested in cervical anthony in future    Trigger points helped 60-70%, states R arm feels stronger after trigger point  injections.  [facility] [place]. Also therapy for back and knee  States traction feels great. Interested in cervical anthony     Injection  R upper trap trigger pint injection 2/2205; 60-70% helped a month  Bl sij injections 3/2025;  R subacromial steroid injection 12/2024 w ortho; 20% relief  7.2024 bilateral SI joint injection. About every 6 months; fluero w pain management  R knee  steroid injection 3/2025 also gets gel injections    Imaging  Reviewed w patient and personally 04/10/25  Mri c spine 3/2025  FINDINGS:  Alignment: There is straightening of the normal cervical lordosis.  There is trace retrolisthesis of C4 on C5. Alignment is otherwise  maintained.      Vertebrae/Intervertebral Discs: The vertebral bodies demonstrate  expected height.  Heterogeneous bone marrow signal without a focal  lesion on the STIR sequence is nonspecific. There is desiccated disc  signal throughout the cervical spine with mild disc height loss at  C4-C5, C5-C6 and C6-C7.      Cord: Normal in caliber and signal.      C1-C2: The cervicomedullary junction appears unremarkable. No spinal  canal stenosis.      C2-C3: No spinal canal or neural foraminal stenosis.      C3-C4: Disc osteophyte complex and uncovertebral joint hypertrophy  and facet arthrosis. No spinal canal stenosis. Mild neural foraminal  stenosis.      C4-C5: Right eccentric disc osteophyte complex, uncovertebral joint  hypertrophy and facet arthrosis. Mild spinal canal stenosis. Moderate  right and mild-to-moderate left neural foraminal stenosis.      C5-C6: Disc osteophyte complex and uncovertebral joint hypertrophy  and facet arthrosis. Moderate spinal canal stenosis. Mild-to-moderate  bilateral neural foraminal stenosis.      C6-C7: Disc osteophyte complex and uncovertebral joint hypertrophy  and facet arthrosis. Mild-to-moderate spinal canal stenosis. Mild  bilateral neural foraminal stenosis.      C7-T1: No spinal canal or neural foraminal  stenosis.      Prevertebral soft tissues are not thickened.      IMPRESSION:  Degenerative changes of the cervical spine with mild-to-moderate  spinal canal stenosis. Varying degrees of neural foraminal narrowing  most pronounced at C4-C5 with moderate right and mild-to-moderate  left neural foraminal stenosis.  Xr c spine 1/2025  FINDINGS:  Mild multilevel cervical degenerative change with diffuse facet  arthrosis and disc disease slightly more prominent C4-5. Alignment  normal.      IMPRESSION:  Mild multilevel cervical degenerative change.  Ct cervical 4/2023  Contiguous axial images of the cervical spine were obtained without  intravenous contrast. Coronal and sagittal reformatted images were  obtained from the axial images.     FINDINGS:  No evidence acute fracture of the cervical spine. There is  straightening of the normal cervical lordosis. There is degenerative  change of the cervical spine with mild multilevel intervertebral disc  space narrowing and mild anterior osseous spurring. There is limited  evaluation of the soft tissues of the spinal canal. There is mild  multilevel posterior osseous spurring and disc protrusion. No  significant prevertebral soft tissue edema.      Impression   No evidence of acute fracture of the cervical spine.     Straightening of the normal cervical lordosis which may be secondary  to patient positioning or muscle spasm.     Mild degenerative change of the cervical spine.     Xr R shoulder 0212/2204  FINDINGS:  No osseous, articular, or soft tissue abnormality.      IMPRESSION:  Normal radiographs right shoulder.    Past Medical History:   Diagnosis Date    Depression, unspecified     Depressive disorder    Medial meniscus tear 04/24/2023    Ocular rosacea     Personal history of other diseases of the digestive system 01/12/2016    History of esophageal reflux    Personal history of other diseases of the respiratory system 08/13/2018    History of acute sinusitis    Personal  history of other endocrine, nutritional and metabolic disease     History of hypothyroidism    Personal history of other specified conditions     History of insomnia    Prediabetes     Prediabetes    Sacro-iliac pain     Tear of meniscus of right knee 04/24/2023    Ulcerative colitis         Past Surgical History:   Procedure Laterality Date    APPENDECTOMY  01/12/2016    Appendectomy    CHOLECYSTECTOMY  01/12/2016    Cholecystectomy    HERNIA REPAIR  01/12/2016    Inguinal Hernia Repair    HYSTERECTOMY  11/07/2017    Hysterectomy    MR HEAD ANGIO WO IV CONTRAST  02/06/2019    MR HEAD ANGIO WO IV CONTRAST 2/6/2019 [facility]    MR NECK ANGIO WO IV CONTRAST  02/06/2019    MR NECK ANGIO WO IV CONTRAST 2/6/2019 [facility]    OTHER SURGICAL HISTORY  08/12/2020    Knee arthroscopy    PANCREAS SURGERY      SINUS SURGERY  01/12/2016    Sinus Surgery    SPLENECTOMY, TOTAL  01/12/2016    Splenectomy    TOTAL ABDOMINAL HYSTERECTOMY W/ BILATERAL SALPINGOOPHORECTOMY  01/12/2016    Total Abdominal Hysterectomy With Removal Of Both Ovaries        Patient Active Problem List    Diagnosis Date Noted    Sacroiliitis (CMS-HCC) 12/13/2023    Colon adenomas 11/21/2023    Chronic idiopathic constipation 11/21/2023    Renal calculi 10/28/2023    Allergic rhinitis 04/24/2023    Anemia 04/24/2023    Anxiety 04/24/2023    B12 deficiency 04/24/2023    Fibromyalgia 04/24/2023    Ulcerative colitis 04/24/2023    Depression 04/24/2023    Diabetes mellitus type 2, uncomplicated 04/24/2023    Arthralgia 04/24/2023    GERD (gastroesophageal reflux disease) 04/24/2023    Hyperlipidemia 04/24/2023    Hypothyroidism 04/24/2023    Obesity 04/24/2023    Spondylosis of lumbosacral region 04/24/2023    Vitamin D deficiency 04/24/2023    Weight gain 04/24/2023        Family History   Problem Relation Name Age of Onset    Arthritis Mother      Heart disease Mother      Other (CVA) Mother      Coronary artery disease Mother      Diabetes  Mother      Osteoporosis Mother      Heart failure Mother      Kidney failure Mother      Alcohol abuse Father      Hypertension Father      Lung cancer Father      Diabetes Maternal Grandmother      Liver cancer Maternal Grandmother      Throat cancer Maternal Grandfather      Osteoarthritis Other          Grandmother        Current Outpatient Medications   Medication Sig Dispense Refill    azelastine (Astelin) 137 mcg (0.1 %) nasal spray Administer 1 spray into each nostril 2 times a day. 30 mL 3    buPROPion XL (Wellbutrin XL) 150 mg 24 hr tablet Take 1 tablet (150 mg) by mouth once daily in the morning. Take before meals.      cloNIDine (Catapres) 0.2 mg tablet TAKE ONE (1) TABLET AT BEDTIME      cyanocobalamin (Vitamin B-12) 1,000 mcg/mL injection Inject 1 mL (1,000 mcg) into the muscle every 30 (thirty) days. 1 mL 11    diclofenac sodium (Voltaren) 1 % gel Apply 4.5 inches (4 g) topically 4 times a day. Apply 2-4 gm to affected area 100 g 1    doxycycline (Adoxa) 150 mg tablet Take 1 tablet (150 mg) by mouth once daily.      fluticasone (Flonase) 50 mcg/actuation nasal spray Administer 1 spray into each nostril 2 times a day. 18.2 g 3    hydrOXYzine pamoate (Vistaril) 25 mg capsule Take 1 capsule (25 mg) by mouth 3 times a day as needed.      levothyroxine (Synthroid, Levoxyl) 88 mcg tablet Take 1 tablet (88 mcg) by mouth early in the morning.. Please take by mouth every other day in an alternating fashion opposite of the 100 mcg 90 tablet 0    linaCLOtide (Linzess) 290 mcg capsule Take 1 capsule (290 mcg) by mouth once daily in the morning. Take before meals. DO NOT CRUSH OR CHEW 30 capsule 4    melatonin 10 mg capsule TAKE 1 CAPSULE Bedtime PRN      metaxalone (Skelaxin) 800 mg tablet TAKE ONE-HALF (1/2) TABLET TWO (2) TIMES A DAY AS NEEDED FOR MUSCLE SPASM 30 tablet 2    pantoprazole (ProtoNix) 40 mg EC tablet Take 1 tablet (40 mg) by mouth once daily. 90 tablet 1    pregabalin (Lyrica) 200 mg capsule Take  1 capsule (200 mg) by mouth 3 times a day. 90 capsule 5    rosuvastatin (Crestor) 10 mg tablet Take 1 tablet (10 mg) by mouth once daily. 90 tablet 1    tirzepatide (Mounjaro) 15 mg/0.5 mL pen injector Inject 15 mg under the skin 1 (one) time per week. 6 mL 1    triamcinolone (Kenalog) 0.1 % cream APPLY TO LEGS TWO (2) TIMES A DAY AS NEEDED      Vyvanse 20 mg capsule Take 1 capsule (20 mg) by mouth once daily. Take in the morning      calcitriol (Rocaltrol) 0.5 mcg capsule Take 1 capsule (0.5 mcg) by mouth once daily. (Patient not taking: Reported on 4/10/2025)      diazePAM (Valium) 2 mg tablet Take 1 tablet (2 mg) by mouth 1 time for 1 dose. 2 tablet 0    DULoxetine (Cymbalta) 30 mg DR capsule Take 1 capsule (30 mg) by mouth once daily. To be taken in combination with duloxetine 60 mg daily for a total of 90 mg daily 30 capsule 5    DULoxetine (Cymbalta) 60 mg DR capsule Take 1 capsule (60 mg) by mouth once daily. To be taken in combination with duloxetine 30 mg daily for total dose of 90 mg daily 30 capsule 5    sodium hyaluronate (Durolane) 60 mg/3 mL injection INJECTION 3ML, ONE DOSE INTO BOTH KNEES ONE TIME 6 mL 0     No current facility-administered medications for this visit.        Allergies   Allergen Reactions    Bactrim [Sulfamethoxazole-Trimethoprim] GI Upset    Cephalexin GI Upset     GASTROINTESTIAL IRRITATION    Ciprofloxacin GI Upset     Reaction from Community: Other(See Desc)    Darvocet A500 [Propoxyphene N-Acetaminophen] Itching    Nsaids (Non-Steroidal Anti-Inflammatory Drug) Other     Hx of ulcerative colitis    Vicodin [Hydrocodone-Acetaminophen] Itching        Social History     Socioeconomic History    Marital status:    Tobacco Use    Smoking status: Every Day     Types: Cigarettes    Smokeless tobacco: Never   Vaping Use    Vaping status: Never Used   Substance and Sexual Activity    Alcohol use: Never     Comment: has not drank in 15 years due to medication    Drug use: Never    Lives w  mom and son  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  Smokes 4-5 cig/day  No alcohol  No drug use       Review of Systems  Constitutional:  Denies fever or chills   Eyes:  Denies change in visual acuity   HENT:  Denies nasal congestion or sore throat   Respiratory:  Denies cough or shortness of breath   Cardiovascular:  Denies chest pain or edema   GI:  Denies abdominal pain, nausea, vomiting, bloody stools or diarrhea   :  Denies dysuria   Integument:  Denies rash   Neurologic: as per HPI  MSK: Per above HPI  Endocrine:  Denies polyuria or polydipsia   Lymphatic:  Denies swollen glands   Psychiatric:  Denies depression or anxiety         Physical Exam:  /80 (BP Location: Left arm, Patient Position: Sitting)   Pulse 89   Resp 22     General:  NAD, F    Psychiatric: appropriate mood & affect.   Cardiovascular:  Normal radial pulses; no UE  edema.  Respiratory:  Normal rate; unlabored breathing.  Skin:  Intact; no erythema; no ecchymosis or rash.  Lymphatic:  No lymphadenopathy or lymphedema.  NEURO:  Alert and appropriate.  Speech fluent, conversing appropriately.   Motor:    Rt: SA 5/5, SER 5/5, EE 5/5, EF 5/5, WE 5/5, FDIM 5/5, ADM 5/5    Lt: SA 5/5, SER 5/5, EE 5/5, EF 5/5, WE 5/5, FDIM 5/5, ADM 5/5    Rt: HF 5/5    Lt: HF 5/5  Sensation:     Light touch intact in the b/l C5-T2 dermatomes.     PP: intact in the b/l C5-T2 dermatomes.  Reflexes:     Right: Biceps 2+, brachioradialis 2+, triceps 2+, patellar 2+, achilles 2+    Left: Biceps 2+, brachioradialis 2+, triceps 2+, patellar 2+, achilles 2+     Babinski's downgoing; no clonus     Hoffmans: negative bilateral   Gait: Normal, narrow based gait.  Tandem gait WNL.    MSK:  Inspection of the neck reveals no soft tissue swelling or ecchymoses.   Cervical flexion full; cervical extension to 10°, right rotation 20°; left rotation 20°.  There is tenderness over the R upper trap.   Special Tests:    Spurling's maneuver:+      Bakody's sign: positive            Impression:  Paola Manley is a 60 y.o. F w pmh of chronic lower back, sacroiliac joint dysfunction, fibromyalgia, and DM presenting with R arm pain due to cervical radiculopathy and secondary R upper trap and levator scapulai myofascial pain. Good relief w trigger point injections for a month. MRI C spine w diffuse degenerative changes and moderate spinal stenosis.       Plan:     1. Xr C spine demonstrates facet arthropathy, overall preserved disc spaces  2.  Continue Pt for core centralization, hep  3. Already on lyrica 200 mg tid, cymbalta and skelexin from pain management, can't do oral nsaids due to ho of UC  4. Trial Voltaren gel prn, lidocaine patch prn, alternate between ice/heat  5.  R upper trap trigger point injections 60-70% relief for a month  6.mri c spine, interested in cervical anthony in future  7. Will send a message to pain management re eval for cervical ANTHONY, she already sees them for L spine  8. Discussed red flags of spinal stenosis  Chu prn         Lisbet Hilliard MD  Physical Medicine and Rehabilitation

## 2025-04-10 NOTE — Clinical Note
Wolfgang sotelo, you guys see this patient for L spine, she was referred to me for C spine.  She made some improvement but now also interested in cervical anthony.  Thank you!

## 2025-04-12 PROCEDURE — RXMED WILLOW AMBULATORY MEDICATION CHARGE

## 2025-04-15 ENCOUNTER — TELEPHONE (OUTPATIENT)
Dept: ORTHOPEDIC SURGERY | Facility: CLINIC | Age: 60
End: 2025-04-15
Payer: COMMERCIAL

## 2025-04-17 ENCOUNTER — PHARMACY VISIT (OUTPATIENT)
Dept: PHARMACY | Facility: CLINIC | Age: 60
End: 2025-04-17
Payer: COMMERCIAL

## 2025-05-01 ENCOUNTER — OFFICE VISIT (OUTPATIENT)
Dept: PAIN MEDICINE | Facility: HOSPITAL | Age: 60
End: 2025-05-01
Payer: COMMERCIAL

## 2025-05-01 VITALS
RESPIRATION RATE: 16 BRPM | SYSTOLIC BLOOD PRESSURE: 120 MMHG | OXYGEN SATURATION: 98 % | HEART RATE: 73 BPM | BODY MASS INDEX: 32.58 KG/M2 | WEIGHT: 215 LBS | DIASTOLIC BLOOD PRESSURE: 82 MMHG | HEIGHT: 68 IN

## 2025-05-01 DIAGNOSIS — M25.50 ARTHRALGIA, UNSPECIFIED JOINT: ICD-10-CM

## 2025-05-01 DIAGNOSIS — M79.7 FIBROMYALGIA: ICD-10-CM

## 2025-05-01 DIAGNOSIS — M46.1 SACROILIITIS (CMS-HCC): ICD-10-CM

## 2025-05-01 DIAGNOSIS — M62.838 MUSCLE SPASM: ICD-10-CM

## 2025-05-01 DIAGNOSIS — M54.16 LUMBAR RADICULAR PAIN: ICD-10-CM

## 2025-05-01 DIAGNOSIS — M54.12 CERVICAL RADICULITIS: Primary | ICD-10-CM

## 2025-05-01 PROCEDURE — 3044F HG A1C LEVEL LT 7.0%: CPT | Performed by: CLINICAL NURSE SPECIALIST

## 2025-05-01 PROCEDURE — 99214 OFFICE O/P EST MOD 30 MIN: CPT | Performed by: CLINICAL NURSE SPECIALIST

## 2025-05-01 PROCEDURE — 3074F SYST BP LT 130 MM HG: CPT | Performed by: CLINICAL NURSE SPECIALIST

## 2025-05-01 PROCEDURE — 3048F LDL-C <100 MG/DL: CPT | Performed by: CLINICAL NURSE SPECIALIST

## 2025-05-01 PROCEDURE — 3008F BODY MASS INDEX DOCD: CPT | Performed by: CLINICAL NURSE SPECIALIST

## 2025-05-01 PROCEDURE — 3079F DIAST BP 80-89 MM HG: CPT | Performed by: CLINICAL NURSE SPECIALIST

## 2025-05-01 RX ORDER — DIAZEPAM 5 MG/1
5 TABLET ORAL ONCE AS NEEDED
OUTPATIENT
Start: 2025-05-01

## 2025-05-01 RX ORDER — METAXALONE 800 MG/1
TABLET ORAL
Qty: 30 TABLET | Refills: 2 | Status: SHIPPED | OUTPATIENT
Start: 2025-05-01

## 2025-05-01 RX ORDER — DULOXETIN HYDROCHLORIDE 60 MG/1
60 CAPSULE, DELAYED RELEASE ORAL DAILY
Qty: 30 CAPSULE | Refills: 5 | Status: SHIPPED | OUTPATIENT
Start: 2025-05-01 | End: 2025-05-31

## 2025-05-01 RX ORDER — PREGABALIN 200 MG/1
200 CAPSULE ORAL 3 TIMES DAILY
Qty: 90 CAPSULE | Refills: 5 | Status: SHIPPED | OUTPATIENT
Start: 2025-05-01

## 2025-05-01 RX ORDER — DULOXETIN HYDROCHLORIDE 30 MG/1
30 CAPSULE, DELAYED RELEASE ORAL DAILY
Qty: 30 CAPSULE | Refills: 5 | Status: SHIPPED | OUTPATIENT
Start: 2025-05-01 | End: 2025-05-31

## 2025-05-01 ASSESSMENT — PATIENT HEALTH QUESTIONNAIRE - PHQ9
SUM OF ALL RESPONSES TO PHQ9 QUESTIONS 1 AND 2: 0
1. LITTLE INTEREST OR PLEASURE IN DOING THINGS: NOT AT ALL
2. FEELING DOWN, DEPRESSED OR HOPELESS: NOT AT ALL

## 2025-05-01 ASSESSMENT — COLUMBIA-SUICIDE SEVERITY RATING SCALE - C-SSRS
2. HAVE YOU ACTUALLY HAD ANY THOUGHTS OF KILLING YOURSELF?: NO
1. IN THE PAST MONTH, HAVE YOU WISHED YOU WERE DEAD OR WISHED YOU COULD GO TO SLEEP AND NOT WAKE UP?: NO
6. HAVE YOU EVER DONE ANYTHING, STARTED TO DO ANYTHING, OR PREPARED TO DO ANYTHING TO END YOUR LIFE?: NO

## 2025-05-01 ASSESSMENT — ENCOUNTER SYMPTOMS
OCCASIONAL FEELINGS OF UNSTEADINESS: 0
DEPRESSION: 0
LOSS OF SENSATION IN FEET: 0

## 2025-05-01 NOTE — ASSESSMENT & PLAN NOTE
60-year-old female with history of low back pain, sacroiliitis and fibromyalgia who has done very well with bilateral SI injections with long-lasting relief presents for follow-up.  Following with orthopedics for knee injections which have provided relief.  Patient was scheduled to repeat her bilateral SI joint injections at her last office visit secondary to return of symptoms consistent with sacroiliitis.  She also was experiencing an increase in cervical symptoms.  Presenting at today's office visit after repeating her bilateral SI joint injections.  Patient currently endorsing 100% relief from her recent injection.  She has noted an improvement in pain radiating from her buttock into her groin.  She has been able to sit more comfortably with less pain.  Has noted she is able to increase her activity level.  Currently most bothered by cervical pain radiating into bilateral shoulders and her right upper extremity.  Pain accompanied by numbness/tingling and upper extremity weakness.  She was evaluated by PM&R for cervical symptoms and proceeded with trigger point injections with limited relief. Sent for cervical MRI and for a formal course of physical therapy targeting cervical symptoms.  Physical therapy provided limited relief. Cervical MRI consistent with degenerative changes; mild to moderate spinal canal stenosis with neuroforaminal narrowing most noted at C4-5; moderate right and mild to moderate left neuroforaminal stenosis.  Reviewed recent imaging and patient presentation with Dr. Matthew suggesting the patient could benefit from a cervical epidural steroid injection at C7/T1. Medical necessity: The patient is presenting primarily with Cervical pain and radicular symptoms.  The pain is constant and of moderate severity that interferes with activities of daily living and sleep. The patient failed conservative therapy to include Tylenol/NSAIDS and physical therapy/supervised home exercise program.  Cervical  spine MRI which I personally reviewed showed  degenerative changes; mild to moderate spinal canal stenosis with neuroforaminal narrowing most noted at C4-5; moderate right and mild to moderate left neuroforaminal stenosis. Plan is to proceed with Cervical Intralaminar Epidural Steroid Injection at C7-T1 with fluoroscopy.  We discussed the risks, benefits and alternatives to the procedure(s) and the patient would like to proceed.  This procedure is part of a comprehensive and multimodal treatment plan to facility physical therapy and a supervised home exercise program.   She continues to manage her pain with Lyrica, duloxetine and Skelaxin. Recommended that she may continue to supplement with Tylenol, however, limit her total Tylenol dose to 3000 mg in 24 hours. Advised patient that she may repeat her SI joint injections every 3 to 4 months if needed.  Plan reviewed with patient at today's visit.    -Scheduled patient for a cervical epidural steroid injection at C7/T1.  Reviewed risks and benefits with patient she would like to proceed.  -Patient may repeat bilateral SI joint injections every 3 to 4 months as needed.  Currently receiving relief from her most recent injection.    -Advised patient to continue physical therapy.    -We will continue pregabalin 200 mg 3 times daily as needed.  Patient feels that this medication has been very beneficial and continues to receive excellent relief without adverse effects.  -We will continue duloxetine 90 mg daily.  Per patient her psychiatrist is in agreement with this dose and would like us to take over prescribing this medication.  -Continue Skelaxin for muscle tightness and spasm.  -Patient may continue to supplement with Tylenol; advised to limit total Tylenol dose to less than 3000 mg in 24 hours.    -She may supplement with lidocaine patches and Voltaren gel as needed.  -Due to the patient being on multiple medications that may affect serotonin we will watch closely for  serotonin syndrome.  Currently denies any symptoms consistent with serotonin syndrome.  -Given the patient's report of fibromyalgia pain  I discussed with her in detail the recommendations most current with the [employer] in the treatment of fibromyalgia.  This includes most recent research showing that the most effective treatment for fibromyalgia is physical exercise.  Cognitive behavioral therapy has also been shown to have positive effects towards pain and other symptoms related to fibromyalgia.  Adjuvant treatments including acupuncture chiropractic and massage therapy have also been beneficial.  Specifically related to medication the FDA has approved few medications directly for the treatment of fibromyalgia and these include Cymbalta and Savella as well as older medications including Elavil.  Medications outside of the serotonin and norepinephrine realms include muscle relaxants as well as Lyrica and gabapentin.  Most importantly the College's recommendation is to avoid opiate narcotic medication for treating fibromyalgia the evidence shows at these drugs are not helpful to most people with fibromyalgia and in fact will cause greater pain sensitivity or make pain persistent  -Patient will follow-up approximately 6 weeks after her injection for reevaluation.

## 2025-05-01 NOTE — PROGRESS NOTES
Subjective   Patient ID: Paola Manley is a 60 y.o. female who presents for sacroiliitis, fibromyalgia, lumbar pain and cervical pain  HPI    60-year-old female with history of low back pain, sacroiliitis, fibromyalgia pain and cervical pain presents for follow-up.  Polyarticular pain most bothersome to knees and shoulders.  She has done physical therapy in the past and continues home therapy exercises and stretches.  She was sent for a formal course of physical therapy by PM&R targeting cervical symptoms.  Also proceeded with trigger point injections with PM&R.  She has done well with bilateral SI joint injections in the past providing significant/sustained relief for up to 6 months.  She had noted symptoms of sacroiliitis at her last office visit and was scheduled to repeat her bilateral SI joint injections.  She does very well with Lyrica 200 mg 3 times daily, duloxetine 90 mg daily and Skelaxin for muscle tightness and spasms.  Supplements with Tylenol.  Presenting at today's office visit for follow-up after repeating her bilateral SI joint injections on 3/21/2025.  Patient received 100% relief of SI pain with these injections.  She states that she continues to receive significant relief.  She denies pain over bilateral SI joints.  Denies any pain radiating into her groin or lower extremities.  Denies numbness and tingling in her lower extremities.  Denies any weakness or changes in bowel/bladder function.  She has been able to increase her activity level secondary to improvement in SI pain.  Sitting is more comfortable secondary to improvement in pain.  Currently endorsing cervical pain radiating into bilateral shoulders and her right upper extremity.  Numbness and tingling in her right upper extremity as well as weakness.  Denies any current balance issues.  Describes her pain as aching and throbbing.  Pain accompanied by increasing headaches.  Use of her upper extremities and rotation of her head increases her  cervical symptoms.  Previously evaluated by orthopedics with a right shoulder corticosteroid injection which provided limited relief.  She was sent to physical therapy by PM&R targeting cervical symptoms.  Physical therapy provided limited relief.  She also proceeded with trigger point injections which provided limited relief.  Patient also has done gel injections for her knee pain which has provided relief.  Continue benefit with Lyrica, duloxetine and Skelaxin.  Supplements with occasional Tylenol.    Location of Pain: pt presents for injection follow up. Pt states she has low back pain. Pt has fibromyalgia.  Cervical pain that radiates down the arm to the hand-Amandain states she probably need a STEPHEN.    Patient states she was recommended CBD oil which she has tried twice- I educated it could possibly flag positive for THC and she states she will no longer take it.     Has done PT for shoulder and back.          Pain Score:  0/10-back                           4/10-cervical pain                           6/10- pain at worst    Treatment:  Lyrica-needs refill  Last dose: 200 mg TID    Efficacy: 90%    Side Effects: none    Other pain medication/neuromodulator: Tylenol arthritis -taking 6 a day/ 90mg Cymbalta total-90mg/ Skelaxin-refills needed    Injections and/or Procedures: Dr. Carlin gel injections bilateral knees.    Bilateral SI Injection-90% relief   3/21/25 Bilateral SI Injection: 100% relief     Other: none    Genetic Swab:  Urine Screen: 8/14/24  Narcotics Agreement: 8/14/24  OA 6/6/24  OARRS:  Margarita Cole, APRN-CNP, APRN-CNS on 5/1/2025  8:49 AM  I have personally reviewed the OARRS report for Paola Manley. I have considered the risks of abuse, dependence, addiction and diversion    Is the patient prescribed a combination of a benzodiazepine and opioid?  No    Last Urine Drug Screen / ordered today: No  Recent Results (from the past 8760 hours)   Amphetamine Confirm, Urine    Collection Time: 08/14/24  12:28 PM   Result Value Ref Range    Methamphetamine Quant, Ur <200 ng/mL    MDA, Urine <200 ng/mL    MDEA, Urine <200 ng/mL    Phentermine,Urine <200 ng/mL    Amphetamines,Urine 4362 ng/mL    MDMA, Urine <200 ng/mL   Confirmation Opiate/Opioid/Benzo Prescription Compliance    Collection Time: 08/14/24 12:28 PM   Result Value Ref Range    Clonazepam <25 <25 ng/mL    7-Aminoclonazepam <25 <25 ng/mL    Alprazolam <25 <25 ng/mL    Alpha-Hydroxyalprazolam <25 <25 ng/mL    Midazolam <25 <25 ng/mL    Alpha-Hydroxymidazolam <25 <25 ng/mL    Chlordiazepoxide <25 <25 ng/mL    Diazepam <25 <25 ng/mL    Nordiazepam <25 <25 ng/mL    Temazepam <25 <25 ng/mL    Oxazepam <25 <25 ng/mL    Lorazepam <25 <25 ng/mL    Methadone <25 <25 ng/mL    EDDP <25 <25 ng/mL    6-Acetylmorphine <25 <25 ng/mL    Codeine <50 <50 ng/mL    Hydrocodone <25 <25 ng/mL    Hydromorphone <25 <25 ng/mL    Morphine  <50 <50 ng/mL    Norhydrocodone <25 <25 ng/mL    Noroxycodone <25 <25 ng/mL    Oxycodone <25 <25 ng/mL    Oxymorphone <25 <25 ng/mL    Fentanyl <2.5 <2.5 ng/mL    Norfentanyl <2.5 <2.5 ng/mL    Tramadol <50 <50 ng/mL    O-Desmethyltramadol <50 <50 ng/mL    Zolpidem <25 <25 ng/mL    Zolpidem Metabolite (ZCA) <25 <25 ng/mL   Screen Opiate/Opioid/Benzo Prescription Compliance    Collection Time: 08/14/24 12:28 PM   Result Value Ref Range    Creatinine, Urine Random 169.2 20.0 - 320.0 mg/dL    Amphetamine Screen, Urine Presumptive Positive (A) Presumptive Negative    Barbiturate Screen, Urine Presumptive Negative Presumptive Negative    Cannabinoid Screen, Urine Presumptive Negative Presumptive Negative    Cocaine Metabolite Screen, Urine Presumptive Negative Presumptive Negative    PCP Screen, Urine Presumptive Negative Presumptive Negative   Tapentadol Confirmation, Urine    Collection Time: 08/14/24 12:28 PM   Result Value Ref Range    Tapentadol <25 <25 ng/mL    N-Desmethyltapentadol <25 <25 ng/mL   Buprenorphine Confirm,Urine    Collection Time:  "08/14/24 12:28 PM   Result Value Ref Range    BUPRENORPHINE GLUC, URINE <5 ng/mL    BUPRENORPHINE ,URINE <2 ng/mL    NALOXONE, URINE <100 ng/mL    NORBUPRENORPHINE GLUC,URINE <5 ng/mL    NORBUPRENORPHINE, URINE <2 ng/mL     Results are as expected.     Controlled Substance Agreement:  Date of the Last Agreement: 8/14/24  Reviewed Controlled Substance Agreement including but not limited to the benefits, risks, and alternatives to treatment with a Controlled Substance medication(s).    Monitoring and compliance:    ORT:    PDUQ:    Office Agreement:      Review of Systems    ROS:   General: No fevers, chills, weight loss  Skin: Negative for lesions  Eyes: No acute vision changes  Ears: No vertigo  Nose, mouth, throat: No difficulty swallowing or speaking  Respiratory: No cough, shortness of breath, cyanosis  Cardiovascular: Negative for chest pain syncope or palpitation  Gastrointestinal: No constipation, nausea, vomiting  Neurological: Negative for headache, positive for: Paresthesia and intermittent weakness  Psychological: Negative for severe or debilitating anxiety, depression. Negative memory loss  Musculoskeletal: Positive for arthralgia, myalgia, pain and spasm  Endocrine: Negative for weight gain, appetite changes, excessive sweating  Allergy/immune: Negative    All 13 systems were reviewed and are within normal levels except as noted or in the history of present illness.  Positive or pertinent negative responses are noted or were in the history of present illness. As noted, the patient denies significant or impairing weakness in the bilateral upper and lower extremities, medication induced constipation, and bowel or bladder incontinence.   Current Medications[1]     Medical History[2]     Surgical History[3]     Family History[4]     RX Allergies[5]     Objective     Visit Vitals  /82   Pulse 73   Resp 16   Ht 1.727 m (5' 8\")   Wt 97.5 kg (215 lb)   SpO2 98%   BMI 32.69 kg/m²   OB Status Postmenopausal "   Smoking Status Every Day   BSA 2.16 m²        Physical Exam    PE:  General: Well-developed, well-nourished, no acute distress. The patient demonstrates no pain behavior, symptom magnification or overt drug-seeking behavior.  Eye: Pupils appropriate for room lighting  Neck/thyroid: No obvious goiter or enlargement of neck noted  Respiratory exam: Normal respiratory effort, unlabored respiration. No accessory muscle use noted  Cardiac exam: Bilateral radial pulses intact  Abdominal: Nondistended  Spine, cervical: Tenderness to paraspinous musculature paracervical region bilaterally.  Myofascial tenderness in muscle tightness upper trapezius muscles right greater than left.  Flexion intact with extension more limited and increasing pain.  Rotational twisting to the right increasing pain.  Positive Spurling's on the right.  Spine, lumbar: The patient is able to rise from a seated to standing position without hesitancy, push off, or delay. Gait is grossly nonantalgic. Tenderness to paraspinous musculature is noted lower lumbar region.  Flexion and extension intact.  Negative straight leg raise.  Negative for SI tenderness.  Neurologic exam: Muscle strength is antigravity in all 4 extremities.  Equal strength bilateral upper and lower extremities 5/5.  Psychiatric exam: Judgment and insight normal, affect normal, speech is fluent, affect appropriate, demonstrating no signs of hypersomnolence, sedation, or confusion          Assessment/Plan   Problem List Items Addressed This Visit           ICD-10-CM    Fibromyalgia M79.7    Relevant Medications    DULoxetine (Cymbalta) 30 mg DR capsule    DULoxetine (Cymbalta) 60 mg DR capsule    pregabalin (Lyrica) 200 mg capsule    Arthralgia M25.50    Sacroiliitis (CMS-HCC) M46.1    Relevant Medications    pregabalin (Lyrica) 200 mg capsule    Cervical radiculitis - Primary M54.12    60-year-old female with history of low back pain, sacroiliitis and fibromyalgia who has done very  well with bilateral SI injections with long-lasting relief presents for follow-up.  Following with orthopedics for knee injections which have provided relief.  Patient was scheduled to repeat her bilateral SI joint injections at her last office visit secondary to return of symptoms consistent with sacroiliitis.  She also was experiencing an increase in cervical symptoms.  Presenting at today's office visit after repeating her bilateral SI joint injections.  Patient currently endorsing 100% relief from her recent injection.  She has noted an improvement in pain radiating from her buttock into her groin.  She has been able to sit more comfortably with less pain.  Has noted she is able to increase her activity level.  Currently most bothered by cervical pain radiating into bilateral shoulders and her right upper extremity.  Pain accompanied by numbness/tingling and upper extremity weakness.  She was evaluated by PM&R for cervical symptoms and proceeded with trigger point injections with limited relief. Sent for cervical MRI and for a formal course of physical therapy targeting cervical symptoms.  Physical therapy provided limited relief. Cervical MRI consistent with degenerative changes; mild to moderate spinal canal stenosis with neuroforaminal narrowing most noted at C4-5; moderate right and mild to moderate left neuroforaminal stenosis.  Reviewed recent imaging and patient presentation with Dr. Matthew suggesting the patient could benefit from a cervical epidural steroid injection at C7/T1. Medical necessity: The patient is presenting primarily with Cervical pain and radicular symptoms.  The pain is constant and of moderate severity that interferes with activities of daily living and sleep. The patient failed conservative therapy to include Tylenol/NSAIDS and physical therapy/supervised home exercise program.  Cervical spine MRI which I personally reviewed showed  degenerative changes; mild to moderate spinal canal  stenosis with neuroforaminal narrowing most noted at C4-5; moderate right and mild to moderate left neuroforaminal stenosis. Plan is to proceed with Cervical Intralaminar Epidural Steroid Injection at C7-T1 with fluoroscopy.  We discussed the risks, benefits and alternatives to the procedure(s) and the patient would like to proceed.  This procedure is part of a comprehensive and multimodal treatment plan to facility physical therapy and a supervised home exercise program.   She continues to manage her pain with Lyrica, duloxetine and Skelaxin. Recommended that she may continue to supplement with Tylenol, however, limit her total Tylenol dose to 3000 mg in 24 hours. Advised patient that she may repeat her SI joint injections every 3 to 4 months if needed.  Plan reviewed with patient at today's visit.    -Scheduled patient for a cervical epidural steroid injection at C7/T1.  Reviewed risks and benefits with patient she would like to proceed.  -Patient may repeat bilateral SI joint injections every 3 to 4 months as needed.  Currently receiving relief from her most recent injection.    -Advised patient to continue physical therapy.    -We will continue pregabalin 200 mg 3 times daily as needed.  Patient feels that this medication has been very beneficial and continues to receive excellent relief without adverse effects.  -We will continue duloxetine 90 mg daily.  Per patient her psychiatrist is in agreement with this dose and would like us to take over prescribing this medication.  -Continue Skelaxin for muscle tightness and spasm.  -Patient may continue to supplement with Tylenol; advised to limit total Tylenol dose to less than 3000 mg in 24 hours.    -She may supplement with lidocaine patches and Voltaren gel as needed.  -Due to the patient being on multiple medications that may affect serotonin we will watch closely for serotonin syndrome.  Currently denies any symptoms consistent with serotonin syndrome.  -Given  the patient's report of fibromyalgia pain  I discussed with her in detail the recommendations most current with the [employer] in the treatment of fibromyalgia.  This includes most recent research showing that the most effective treatment for fibromyalgia is physical exercise.  Cognitive behavioral therapy has also been shown to have positive effects towards pain and other symptoms related to fibromyalgia.  Adjuvant treatments including acupuncture chiropractic and massage therapy have also been beneficial.  Specifically related to medication the FDA has approved few medications directly for the treatment of fibromyalgia and these include Cymbalta and Savella as well as older medications including Elavil.  Medications outside of the serotonin and norepinephrine realms include muscle relaxants as well as Lyrica and gabapentin.  Most importantly the College's recommendation is to avoid opiate narcotic medication for treating fibromyalgia the evidence shows at these drugs are not helpful to most people with fibromyalgia and in fact will cause greater pain sensitivity or make pain persistent  -Patient will follow-up approximately 6 weeks after her injection for reevaluation.           Relevant Medications    DULoxetine (Cymbalta) 30 mg DR capsule    DULoxetine (Cymbalta) 60 mg DR capsule    pregabalin (Lyrica) 200 mg capsule    Other Relevant Orders    [place] pain management    Epidural Steroid Injection     Other Visit Diagnoses         Codes      Lumbar radicular pain     M54.16    Relevant Medications    pregabalin (Lyrica) 200 mg capsule      Muscle spasm     M62.838    Relevant Medications    metaxalone (Skelaxin) 800 mg tablet                 [1]   Current Outpatient Medications:     azelastine (Astelin) 137 mcg (0.1 %) nasal spray, Administer 1 spray into each nostril 2 times a day., Disp: 30 mL, Rfl: 3    buPROPion XL (Wellbutrin XL) 150 mg 24 hr tablet, Take 1 tablet (150 mg) by mouth once daily  in the morning. Take before meals., Disp: , Rfl:     cloNIDine (Catapres) 0.2 mg tablet, TAKE ONE (1) TABLET AT BEDTIME, Disp: , Rfl:     cyanocobalamin (Vitamin B-12) 1,000 mcg/mL injection, Inject 1 mL (1,000 mcg) into the muscle every 30 (thirty) days., Disp: 1 mL, Rfl: 11    doxycycline (Adoxa) 150 mg tablet, Take 1 tablet (150 mg) by mouth once daily., Disp: , Rfl:     fluticasone (Flonase) 50 mcg/actuation nasal spray, Administer 1 spray into each nostril 2 times a day., Disp: 18.2 g, Rfl: 3    hydrOXYzine pamoate (Vistaril) 25 mg capsule, Take 1 capsule (25 mg) by mouth 3 times a day as needed., Disp: , Rfl:     levothyroxine (Synthroid, Levoxyl) 88 mcg tablet, Take 1 tablet (88 mcg) by mouth early in the morning.. Please take by mouth every other day in an alternating fashion opposite of the 100 mcg, Disp: 90 tablet, Rfl: 0    linaCLOtide (Linzess) 290 mcg capsule, Take 1 capsule (290 mcg) by mouth once daily in the morning. Take before meals. DO NOT CRUSH OR CHEW, Disp: 30 capsule, Rfl: 4    melatonin 10 mg capsule, TAKE 1 CAPSULE Bedtime PRN, Disp: , Rfl:     pantoprazole (ProtoNix) 40 mg EC tablet, Take 1 tablet (40 mg) by mouth once daily., Disp: 90 tablet, Rfl: 1    rosuvastatin (Crestor) 10 mg tablet, Take 1 tablet (10 mg) by mouth once daily., Disp: 90 tablet, Rfl: 1    sodium hyaluronate (Durolane) 60 mg/3 mL injection, INJECTION 3ML, ONE DOSE INTO BOTH KNEES ONE TIME, Disp: 6 mL, Rfl: 0    tirzepatide (Mounjaro) 15 mg/0.5 mL pen injector, Inject 15 mg under the skin 1 (one) time per week., Disp: 6 mL, Rfl: 1    triamcinolone (Kenalog) 0.1 % cream, APPLY TO LEGS TWO (2) TIMES A DAY AS NEEDED, Disp: , Rfl:     Vyvanse 20 mg capsule, Take 1 capsule (20 mg) by mouth once daily. Take in the morning, Disp: , Rfl:     diazePAM (Valium) 2 mg tablet, Take 1 tablet (2 mg) by mouth 1 time for 1 dose., Disp: 2 tablet, Rfl: 0    diclofenac sodium (Voltaren) 1 % gel, Apply 4.5 inches (4 g) topically 4 times a  day. Apply 2-4 gm to affected area (Patient not taking: Reported on 5/1/2025), Disp: 100 g, Rfl: 1    DULoxetine (Cymbalta) 30 mg DR capsule, Take 1 capsule (30 mg) by mouth once daily. To be taken in combination with duloxetine 60 mg daily for a total of 90 mg daily, Disp: 30 capsule, Rfl: 5    DULoxetine (Cymbalta) 60 mg DR capsule, Take 1 capsule (60 mg) by mouth once daily. To be taken in combination with duloxetine 30 mg daily for total dose of 90 mg daily, Disp: 30 capsule, Rfl: 5    metaxalone (Skelaxin) 800 mg tablet, TAKE ONE-HALF (1/2) TABLET TWO (2) TIMES A DAY AS NEEDED FOR MUSCLE SPASM, Disp: 30 tablet, Rfl: 2    pregabalin (Lyrica) 200 mg capsule, Take 1 capsule (200 mg) by mouth 3 times a day., Disp: 90 capsule, Rfl: 5  [2]   Past Medical History:  Diagnosis Date    Depression, unspecified     Depressive disorder    Medial meniscus tear 04/24/2023    Ocular rosacea     Personal history of other diseases of the digestive system 01/12/2016    History of esophageal reflux    Personal history of other diseases of the respiratory system 08/13/2018    History of acute sinusitis    Personal history of other endocrine, nutritional and metabolic disease     History of hypothyroidism    Personal history of other specified conditions     History of insomnia    Prediabetes     Prediabetes    Sacro-iliac pain     Tear of meniscus of right knee 04/24/2023    Ulcerative colitis    [3]   Past Surgical History:  Procedure Laterality Date    APPENDECTOMY  01/12/2016    Appendectomy    CHOLECYSTECTOMY  01/12/2016    Cholecystectomy    HERNIA REPAIR  01/12/2016    Inguinal Hernia Repair    HYSTERECTOMY  11/07/2017    Hysterectomy    MR HEAD ANGIO WO IV CONTRAST  02/06/2019    MR HEAD ANGIO WO IV CONTRAST 2/6/2019 [facility]    MR NECK ANGIO WO IV CONTRAST  02/06/2019    MR NECK ANGIO WO IV CONTRAST 2/6/2019 [facility]    OTHER SURGICAL HISTORY  08/12/2020    Knee arthroscopy    PANCREAS SURGERY       SINUS SURGERY  01/12/2016    Sinus Surgery    SPLENECTOMY, TOTAL  01/12/2016    Splenectomy    TOTAL ABDOMINAL HYSTERECTOMY W/ BILATERAL SALPINGOOPHORECTOMY  01/12/2016    Total Abdominal Hysterectomy With Removal Of Both Ovaries   [4]   Family History  Problem Relation Name Age of Onset    Arthritis Mother      Heart disease Mother      Other (CVA) Mother      Coronary artery disease Mother      Diabetes Mother      Osteoporosis Mother      Heart failure Mother      Kidney failure Mother      Alcohol abuse Father      Hypertension Father      Lung cancer Father      Diabetes Maternal Grandmother      Liver cancer Maternal Grandmother      Throat cancer Maternal Grandfather      Osteoarthritis Other          Grandmother   [5]   Allergies  Allergen Reactions    Bactrim [Sulfamethoxazole-Trimethoprim] GI Upset    Cephalexin GI Upset     GASTROINTESTIAL IRRITATION    Ciprofloxacin GI Upset     Reaction from Community: Other(See Desc)    Darvocet A500 [Propoxyphene N-Acetaminophen] Itching    Nsaids (Non-Steroidal Anti-Inflammatory Drug) Other     Hx of ulcerative colitis    Vicodin [Hydrocodone-Acetaminophen] Itching

## 2025-05-05 DIAGNOSIS — N20.0 RENAL CALCULI: ICD-10-CM

## 2025-05-08 ENCOUNTER — APPOINTMENT (OUTPATIENT)
Dept: RADIOLOGY | Facility: HOSPITAL | Age: 60
End: 2025-05-08
Payer: COMMERCIAL

## 2025-05-08 ENCOUNTER — HOSPITAL ENCOUNTER (EMERGENCY)
Facility: HOSPITAL | Age: 60
Discharge: HOME | End: 2025-05-08
Payer: COMMERCIAL

## 2025-05-08 ENCOUNTER — PHARMACY VISIT (OUTPATIENT)
Dept: PHARMACY | Facility: CLINIC | Age: 60
End: 2025-05-08
Payer: COMMERCIAL

## 2025-05-08 ENCOUNTER — APPOINTMENT (OUTPATIENT)
Dept: CARDIOLOGY | Facility: HOSPITAL | Age: 60
End: 2025-05-08
Payer: COMMERCIAL

## 2025-05-08 VITALS
OXYGEN SATURATION: 99 % | RESPIRATION RATE: 18 BRPM | DIASTOLIC BLOOD PRESSURE: 88 MMHG | SYSTOLIC BLOOD PRESSURE: 147 MMHG | HEIGHT: 68 IN | HEART RATE: 82 BPM | WEIGHT: 200 LBS | TEMPERATURE: 98.6 F | BODY MASS INDEX: 30.31 KG/M2

## 2025-05-08 DIAGNOSIS — N20.0 KIDNEY STONE ON RIGHT SIDE: Primary | ICD-10-CM

## 2025-05-08 DIAGNOSIS — N30.90 CYSTITIS: ICD-10-CM

## 2025-05-08 LAB
ACANTHOCYTES BLD QL SMEAR: NORMAL
ALBUMIN SERPL BCP-MCNC: 4.3 G/DL (ref 3.4–5)
ALP SERPL-CCNC: 84 U/L (ref 33–136)
ALT SERPL W P-5'-P-CCNC: 14 U/L (ref 7–45)
AMORPH CRY #/AREA UR COMP ASSIST: ABNORMAL /HPF
ANION GAP SERPL CALC-SCNC: 16 MMOL/L (ref 10–20)
APPEARANCE UR: ABNORMAL
AST SERPL W P-5'-P-CCNC: 16 U/L (ref 9–39)
BASOPHILS # BLD AUTO: 0.08 X10*3/UL (ref 0–0.1)
BASOPHILS NFR BLD AUTO: 0.7 %
BILIRUB SERPL-MCNC: 0.5 MG/DL (ref 0–1.2)
BILIRUB UR STRIP.AUTO-MCNC: NEGATIVE MG/DL
BUN SERPL-MCNC: 19 MG/DL (ref 6–23)
CALCIUM SERPL-MCNC: 9.7 MG/DL (ref 8.6–10.3)
CARDIAC TROPONIN I PNL SERPL HS: 4 NG/L (ref 0–13)
CARDIAC TROPONIN I PNL SERPL HS: 5 NG/L (ref 0–13)
CHLORIDE SERPL-SCNC: 104 MMOL/L (ref 98–107)
CO2 SERPL-SCNC: 25 MMOL/L (ref 21–32)
COLOR UR: ABNORMAL
CREAT SERPL-MCNC: 0.95 MG/DL (ref 0.5–1.05)
EGFRCR SERPLBLD CKD-EPI 2021: 69 ML/MIN/1.73M*2
EOSINOPHIL # BLD AUTO: 0.22 X10*3/UL (ref 0–0.7)
EOSINOPHIL NFR BLD AUTO: 1.8 %
ERYTHROCYTE [DISTWIDTH] IN BLOOD BY AUTOMATED COUNT: 13.9 % (ref 11.5–14.5)
GLUCOSE SERPL-MCNC: 96 MG/DL (ref 74–99)
GLUCOSE UR STRIP.AUTO-MCNC: NORMAL MG/DL
HCT VFR BLD AUTO: 39.7 % (ref 36–46)
HGB BLD-MCNC: 13.7 G/DL (ref 12–16)
IMM GRANULOCYTES # BLD AUTO: 0.05 X10*3/UL (ref 0–0.7)
IMM GRANULOCYTES NFR BLD AUTO: 0.4 % (ref 0–0.9)
KETONES UR STRIP.AUTO-MCNC: NEGATIVE MG/DL
LEUKOCYTE ESTERASE UR QL STRIP.AUTO: ABNORMAL
LIPASE SERPL-CCNC: 33 U/L (ref 9–82)
LYMPHOCYTES # BLD AUTO: 2.7 X10*3/UL (ref 1.2–4.8)
LYMPHOCYTES NFR BLD AUTO: 22.4 %
MCH RBC QN AUTO: 33.3 PG (ref 26–34)
MCHC RBC AUTO-ENTMCNC: 34.5 G/DL (ref 32–36)
MCV RBC AUTO: 96 FL (ref 80–100)
MONOCYTES # BLD AUTO: 1.22 X10*3/UL (ref 0.1–1)
MONOCYTES NFR BLD AUTO: 10.1 %
MUCOUS THREADS #/AREA URNS AUTO: ABNORMAL /LPF
NEUTROPHILS # BLD AUTO: 7.81 X10*3/UL (ref 1.2–7.7)
NEUTROPHILS NFR BLD AUTO: 64.6 %
NITRITE UR QL STRIP.AUTO: NEGATIVE
NRBC BLD-RTO: 0 /100 WBCS (ref 0–0)
PH UR STRIP.AUTO: 8 [PH]
PLATELET # BLD AUTO: 249 X10*3/UL (ref 150–450)
POTASSIUM SERPL-SCNC: 3.5 MMOL/L (ref 3.5–5.3)
PROT SERPL-MCNC: 7.2 G/DL (ref 6.4–8.2)
PROT UR STRIP.AUTO-MCNC: NEGATIVE MG/DL
RBC # BLD AUTO: 4.12 X10*6/UL (ref 4–5.2)
RBC # UR STRIP.AUTO: ABNORMAL MG/DL
RBC #/AREA URNS AUTO: >20 /HPF
RBC MORPH BLD: NORMAL
SCHISTOCYTES BLD QL SMEAR: NORMAL
SODIUM SERPL-SCNC: 141 MMOL/L (ref 136–145)
SP GR UR STRIP.AUTO: 1.02
SQUAMOUS #/AREA URNS AUTO: ABNORMAL /HPF
UROBILINOGEN UR STRIP.AUTO-MCNC: NORMAL MG/DL
WBC # BLD AUTO: 12.1 X10*3/UL (ref 4.4–11.3)
WBC #/AREA URNS AUTO: ABNORMAL /HPF

## 2025-05-08 PROCEDURE — RXMED WILLOW AMBULATORY MEDICATION CHARGE

## 2025-05-08 PROCEDURE — 96376 TX/PRO/DX INJ SAME DRUG ADON: CPT

## 2025-05-08 PROCEDURE — 81001 URINALYSIS AUTO W/SCOPE: CPT | Performed by: PHYSICIAN ASSISTANT

## 2025-05-08 PROCEDURE — 80053 COMPREHEN METABOLIC PANEL: CPT | Performed by: PHYSICIAN ASSISTANT

## 2025-05-08 PROCEDURE — 93005 ELECTROCARDIOGRAM TRACING: CPT

## 2025-05-08 PROCEDURE — 2500000004 HC RX 250 GENERAL PHARMACY W/ HCPCS (ALT 636 FOR OP/ED): Mod: JZ

## 2025-05-08 PROCEDURE — 87086 URINE CULTURE/COLONY COUNT: CPT | Mod: GEALAB | Performed by: PHYSICIAN ASSISTANT

## 2025-05-08 PROCEDURE — 83690 ASSAY OF LIPASE: CPT | Performed by: PHYSICIAN ASSISTANT

## 2025-05-08 PROCEDURE — 2500000004 HC RX 250 GENERAL PHARMACY W/ HCPCS (ALT 636 FOR OP/ED): Performed by: PHYSICIAN ASSISTANT

## 2025-05-08 PROCEDURE — 85025 COMPLETE CBC W/AUTO DIFF WBC: CPT | Performed by: PHYSICIAN ASSISTANT

## 2025-05-08 PROCEDURE — 36415 COLL VENOUS BLD VENIPUNCTURE: CPT | Performed by: PHYSICIAN ASSISTANT

## 2025-05-08 PROCEDURE — 84484 ASSAY OF TROPONIN QUANT: CPT | Performed by: PHYSICIAN ASSISTANT

## 2025-05-08 PROCEDURE — 96365 THER/PROPH/DIAG IV INF INIT: CPT

## 2025-05-08 PROCEDURE — 99285 EMERGENCY DEPT VISIT HI MDM: CPT | Mod: 25

## 2025-05-08 PROCEDURE — 74176 CT ABD & PELVIS W/O CONTRAST: CPT | Mod: FOREIGN READ | Performed by: RADIOLOGY

## 2025-05-08 PROCEDURE — 74176 CT ABD & PELVIS W/O CONTRAST: CPT

## 2025-05-08 PROCEDURE — 96375 TX/PRO/DX INJ NEW DRUG ADDON: CPT

## 2025-05-08 RX ORDER — MORPHINE SULFATE 4 MG/ML
4 INJECTION INTRAVENOUS ONCE
Status: COMPLETED | OUTPATIENT
Start: 2025-05-08 | End: 2025-05-08

## 2025-05-08 RX ORDER — ONDANSETRON 4 MG/1
4 TABLET, ORALLY DISINTEGRATING ORAL EVERY 8 HOURS PRN
Qty: 20 TABLET | Refills: 0 | Status: SHIPPED | OUTPATIENT
Start: 2025-05-08 | End: 2025-05-15

## 2025-05-08 RX ORDER — MORPHINE SULFATE 4 MG/ML
INJECTION INTRAVENOUS
Status: COMPLETED
Start: 2025-05-08 | End: 2025-05-08

## 2025-05-08 RX ORDER — OXYCODONE AND ACETAMINOPHEN 5; 325 MG/1; MG/1
1 TABLET ORAL EVERY 6 HOURS PRN
Qty: 12 TABLET | Refills: 0 | Status: SHIPPED | OUTPATIENT
Start: 2025-05-08 | End: 2025-05-09 | Stop reason: SDUPTHER

## 2025-05-08 RX ORDER — DOCUSATE SODIUM 100 MG/1
100 CAPSULE, LIQUID FILLED ORAL EVERY 12 HOURS
Qty: 10 CAPSULE | Refills: 0 | Status: SHIPPED | OUTPATIENT
Start: 2025-05-08 | End: 2025-05-15

## 2025-05-08 RX ORDER — CEFTRIAXONE 2 G/50ML
2 INJECTION, SOLUTION INTRAVENOUS ONCE
Status: COMPLETED | OUTPATIENT
Start: 2025-05-08 | End: 2025-05-08

## 2025-05-08 RX ORDER — ONDANSETRON HYDROCHLORIDE 2 MG/ML
4 INJECTION, SOLUTION INTRAVENOUS ONCE
Status: COMPLETED | OUTPATIENT
Start: 2025-05-08 | End: 2025-05-08

## 2025-05-08 RX ORDER — CEFUROXIME AXETIL 500 MG/1
500 TABLET ORAL 2 TIMES DAILY
Qty: 20 TABLET | Refills: 0 | Status: SHIPPED | OUTPATIENT
Start: 2025-05-08 | End: 2025-05-18

## 2025-05-08 RX ADMIN — LIDOCAINE HYDROCHLORIDE 136 MG: 20 INJECTION, SOLUTION EPIDURAL; INFILTRATION; INTRACAUDAL; PERINEURAL at 22:27

## 2025-05-08 RX ADMIN — MORPHINE SULFATE 4 MG: 4 INJECTION INTRAVENOUS at 20:37

## 2025-05-08 RX ADMIN — CEFTRIAXONE 2 G: 2 INJECTION, SOLUTION INTRAVENOUS at 23:10

## 2025-05-08 RX ADMIN — ONDANSETRON 4 MG: 2 INJECTION, SOLUTION INTRAMUSCULAR; INTRAVENOUS at 20:37

## 2025-05-08 RX ADMIN — MORPHINE SULFATE 4 MG: 4 INJECTION INTRAVENOUS at 23:10

## 2025-05-08 ASSESSMENT — PAIN DESCRIPTION - PAIN TYPE
TYPE: ACUTE PAIN
TYPE: ACUTE PAIN

## 2025-05-08 ASSESSMENT — PAIN SCALES - GENERAL
PAINLEVEL_OUTOF10: 5 - MODERATE PAIN
PAINLEVEL_OUTOF10: 10 - WORST POSSIBLE PAIN
PAINLEVEL_OUTOF10: 0 - NO PAIN

## 2025-05-08 ASSESSMENT — LIFESTYLE VARIABLES
HAVE YOU EVER FELT YOU SHOULD CUT DOWN ON YOUR DRINKING: NO
EVER FELT BAD OR GUILTY ABOUT YOUR DRINKING: NO
EVER HAD A DRINK FIRST THING IN THE MORNING TO STEADY YOUR NERVES TO GET RID OF A HANGOVER: NO
HAVE PEOPLE ANNOYED YOU BY CRITICIZING YOUR DRINKING: NO
TOTAL SCORE: 0

## 2025-05-08 ASSESSMENT — PAIN - FUNCTIONAL ASSESSMENT
PAIN_FUNCTIONAL_ASSESSMENT: 0-10
PAIN_FUNCTIONAL_ASSESSMENT: 0-10

## 2025-05-08 ASSESSMENT — PAIN DESCRIPTION - LOCATION: LOCATION: OTHER (COMMENT)

## 2025-05-08 ASSESSMENT — PAIN DESCRIPTION - ORIENTATION
ORIENTATION: RIGHT
ORIENTATION: RIGHT

## 2025-05-09 DIAGNOSIS — N20.0 KIDNEY STONE ON RIGHT SIDE: ICD-10-CM

## 2025-05-09 LAB
ATRIAL RATE: 79 BPM
HOLD SPECIMEN: 293
P AXIS: 51 DEGREES
P OFFSET: 195 MS
P ONSET: 143 MS
PR INTERVAL: 154 MS
Q ONSET: 220 MS
QRS COUNT: 13 BEATS
QRS DURATION: 96 MS
QT INTERVAL: 410 MS
QTC CALCULATION(BAZETT): 470 MS
QTC FREDERICIA: 449 MS
R AXIS: -8 DEGREES
T AXIS: 31 DEGREES
T OFFSET: 425 MS
VENTRICULAR RATE: 79 BPM

## 2025-05-09 RX ORDER — OXYCODONE AND ACETAMINOPHEN 5; 325 MG/1; MG/1
1 TABLET ORAL EVERY 6 HOURS PRN
Qty: 12 TABLET | Refills: 0 | Status: SHIPPED | OUTPATIENT
Start: 2025-05-09 | End: 2025-05-13 | Stop reason: SDUPTHER

## 2025-05-09 NOTE — ED PROVIDER NOTES
HPI   Chief Complaint   Patient presents with    Flank Pain     Right sided flank pain that began a few days ago. Hx of kidney stones. Endorses nausea and vomiting. Denies urinary sx.       This is a 60-year-old female coming in for right-sided back and flank pain.  She states that her symptoms started a few days ago but have been waxing and waning.  Her pain came back today.  She does have a long history of kidney stones states that the feels similar.  She sees Dr. Reyes for urology.  She denies any fevers or chills.  She denies any urinary symptoms including dysuria.  Patient has had nausea and vomiting.  She believes it is secondary to the pain.      History provided by:  Patient and spouse          Patient History   Medical History[1]  Surgical History[2]  Family History[3]  Social History[4]    Physical Exam   ED Triage Vitals [05/08/25 2012]   Temperature Heart Rate Respirations BP   36.6 °C (97.9 °F) 83 18 163/88      Pulse Ox Temp Source Heart Rate Source Patient Position   100 % Temporal Monitor Sitting      BP Location FiO2 (%)     Right arm --       Physical Exam  Vitals and nursing note reviewed.   Constitutional:       General: She is not in acute distress.     Appearance: Normal appearance. She is not toxic-appearing.   HENT:      Head: Normocephalic and atraumatic.      Nose: Nose normal.      Mouth/Throat:      Mouth: Mucous membranes are moist.      Pharynx: Oropharynx is clear.   Eyes:      Extraocular Movements: Extraocular movements intact.      Conjunctiva/sclera: Conjunctivae normal.      Pupils: Pupils are equal, round, and reactive to light.   Cardiovascular:      Rate and Rhythm: Normal rate and regular rhythm.      Pulses: Normal pulses.      Heart sounds: Normal heart sounds.   Pulmonary:      Effort: Pulmonary effort is normal. No respiratory distress.      Breath sounds: Normal breath sounds.   Abdominal:      General: Abdomen is flat. Bowel sounds are normal.      Palpations: Abdomen  is soft.      Tenderness: There is no abdominal tenderness. There is no right CVA tenderness or left CVA tenderness.   Musculoskeletal:         General: Normal range of motion.      Cervical back: Normal range of motion and neck supple.   Skin:     General: Skin is warm and dry.      Capillary Refill: Capillary refill takes less than 2 seconds.      Coloration: Skin is not jaundiced or pale.      Findings: No bruising.   Neurological:      General: No focal deficit present.      Mental Status: She is alert and oriented to person, place, and time. Mental status is at baseline.   Psychiatric:         Mood and Affect: Mood normal.         Behavior: Behavior normal.           ED Course & MDM   Diagnoses as of 05/08/25 2309   Kidney stone on right side   Cystitis                 No data recorded     Borger Coma Scale Score: 15 (05/08/25 2137 : Ama Vizcaino RN)                           Medical Decision Making  Summary:  Medical Decision Making:   Patient presented as described in HPI. Patient case including ROS, PE, and treatment and plan discussed with ED attending if attached as cosigner. Results from labs and or imaging included below if completed. Paola Manley  is a 60 y.o. coming in for Patient presents with:  Flank Pain: Right sided flank pain that began a few days ago. Hx of kidney stones. Endorses nausea and vomiting. Denies urinary sx.  .  Patient does not appear toxic however does appear uncomfortable.  Consider kidney stone versus appendicitis.  Due to patient's complaint lab work was completed.  Patient has slightly elevated white blood cell count of 12.  CT was ordered and patient was given morphine as well as Zofran.  Patient states that her pain greatly improved after morphine.  CT imaging was completed does show severe hydroureteronephrosis as well as a 6 mm kidney stone.  She reported that her pain started to come back and is now rated a 4 out of 10.  She is given IV lidocaine for further pain  control.  This did not help much therefore she was ordered morphine.  Patient was able to give a urine sample.  She does have white blood cells present in the urine and could be either pyuria from the kidney stone or due to the cystitis because of the slight white blood cell count elevation as well as the hydroureteronephrosis patient will be treated on antibiotics.  She states that her reaction to cephalosporins is a GI upset.  She states that she feels nauseous with it.  She is agreeable to take cephalosporins and will be given Rocephin here.  We given the further dose of morphine for further pain control x 2 doses of narcotics but feels well enough to go home.  She will call Dr. Polanco in the morning and will return with any worsening or changes symptoms.  She is agreeable to go home on cephalosporins and will take it with food as well as she will be given Zofran.  She will be placed on stool softeners and given Percocet for pain control.  Patient will return if she develops any fevers immediately, worsening pain or if she is vomiting in but keep down medications.      Disposition is completed with shared decision making with the patient or guardian present with the patient. They were advised to follow up with PCP or recommended provider in 2-3 days for another evaluation and exam. I advised the patient to return or go to closest emergency room immediately if symptoms change, get worse, or new symptoms develop prior to follow up. I explained the plan and treatment course. Patient/guardian is in agreement with plan, treatment course, and follow up and state that they will comply.    Labs Reviewed  CBC WITH AUTO DIFFERENTIAL - Abnormal     WBC                           12.1 (*)               nRBC                          0.0                    RBC                           4.12                   Hemoglobin                    13.7                   Hematocrit                    39.7                   MCV                            96                     MCH                           33.3                   MCHC                          34.5                   RDW                           13.9                   Platelets                     249                    Neutrophils %                 64.6                   Immature Granulocytes %, Automated   0.4                    Lymphocytes %                 22.4                   Monocytes %                   10.1                   Eosinophils %                 1.8                    Basophils %                   0.7                    Neutrophils Absolute          7.81 (*)               Immature Granulocytes Absolute, Au*   0.05                   Lymphocytes Absolute          2.70                   Monocytes Absolute            1.22 (*)               Eosinophils Absolute          0.22                   Basophils Absolute            0.08                URINALYSIS WITH REFLEX CULTURE AND MICROSCOPIC - Abnormal     Color, Urine                    (*)                  Appearance, Urine             Ex.Turbid (*)               Specific Gravity, Urine       1.019                  pH, Urine                     8.0                    Protein, Urine                NEGATIVE                Glucose, Urine                Normal                 Blood, Urine                  0.2 (2+) (*)               Ketones, Urine                NEGATIVE                Bilirubin, Urine              NEGATIVE                Urobilinogen, Urine           Normal                 Nitrite, Urine                NEGATIVE                Leukocyte Esterase, Urine     25 Archana/uL (*)            MICROSCOPIC ONLY, URINE - Abnormal     WBC, Urine                    21-50 (*)               RBC, Urine                    >20 (*)                Squamous Epithelial Cells, Urine                          Mucus, Urine                  FEW                    Amorphous Crystals, Urine     3+ (*)              COMPREHENSIVE METABOLIC PANEL -  Normal     Glucose                       96                     Sodium                        141                    Potassium                     3.5                    Chloride                      104                    Bicarbonate                   25                     Anion Gap                     16                     Urea Nitrogen                 19                     Creatinine                    0.95                   eGFR                          69                     Calcium                       9.7                    Albumin                       4.3                    Alkaline Phosphatase          84                     Total Protein                 7.2                    AST                           16                     Bilirubin, Total              0.5                    ALT                           14                  LIPASE - Normal     Lipase                        33                       Narrative: Venipuncture immediately after or during the administration of Metamizole may lead to falsely low results. Testing should be performed immediately prior to Metamizole dosing.  TROPONIN I, HIGH SENSITIVITY - Normal     Troponin I, High Sensitivity   4                        Narrative: Less than 99th percentile of normal range cutoff-                Female and children under 18 years old <14 ng/L; Male <21 ng/L: Negative                Repeat testing should be performed if clinically indicated.                                 Female and children under 18 years old 14-50 ng/L; Male 21-50 ng/L:                Consistent with possible cardiac damage and possible increased clinical                 risk. Serial measurements may help to assess extent of myocardial damage.                                 >50 ng/L: Consistent with cardiac damage, increased clinical risk and                myocardial infarction. Serial measurements may help assess extent of                 myocardial damage.                                   NOTE: Children less than 1 year old may have higher baseline troponin                 levels and results should be interpreted in conjunction with the overall                 clinical context.                                 NOTE: Troponin I testing is performed using a different                 testing methodology at [facility] than at other                 [facility]. Direct result comparisons should only                 be made within the same method.  TROPONIN I, HIGH SENSITIVITY - Normal     Troponin I, High Sensitivity   5                        Narrative: Less than 99th percentile of normal range cutoff-                Female and children under 18 years old <14 ng/L; Male <21 ng/L: Negative                Repeat testing should be performed if clinically indicated.                                 Female and children under 18 years old 14-50 ng/L; Male 21-50 ng/L:                Consistent with possible cardiac damage and possible increased clinical                 risk. Serial measurements may help to assess extent of myocardial damage.                                 >50 ng/L: Consistent with cardiac damage, increased clinical risk and                myocardial infarction. Serial measurements may help assess extent of                 myocardial damage.                                  NOTE: Children less than 1 year old may have higher baseline troponin                 levels and results should be interpreted in conjunction with the overall                 clinical context.                                 NOTE: Troponin I testing is performed using a different                 testing methodology at [facility] than at other                 [facility]. Direct result comparisons should only                 be made within the same method.  URINE CULTURE  URINALYSIS WITH REFLEX CULTURE AND MICROSCOPIC       Narrative: The following orders were created  for panel order Urinalysis with Reflex Culture and Microscopic.                Procedure                               Abnormality         Status                                   ---------                               -----------         ------                                   Urinalysis with Reflex C...[[id number]]  Abnormal            Final result                             Extra Urine Gray Tube[[id number]]                            In process                                               Please view results for these tests on the individual orders.  EXTRA URINE GRAY TUBE  MORPHOLOGY   CT abdomen pelvis wo IV contrast   Final Result    1.Severe right-sided hydronephrosis with 6 mm calculus distal    right ureter.    2.Nonobstructing left renal calculi.    3.Small degree of fluid anterior abdominal wall musculature just    above the umbilicus.    Signed by Ulysses Alfaro, DO                            Tests/Medications/Escalations of Care considered but not given: As in MDM    Patient care discussed with: N/A  Social Determinants affecting care: N/A    Final diagnosis and disposition as documented     Diagnoses as of 05/08/25 2315  Kidney stone on right side  Cystitis       Shared decision making was completed and determined that patient will be discharged. I discussed the differential; results and discharge plan with the patient and/or family/friend/caregiver if present.  I emphasized the importance of follow-up with the physician I referred them to in the timeframe recommended.  I explained reasons for the patient to return to the Emergency Department. They agreed that if they feel their condition is worsening or if they have any other concern they should call 911 immediately for further assistance. I gave the patient an opportunity to ask all questions they had and answered all of them accordingly. They understand return precautions and discharge instructions. The patient and/or family/friend/caregiver  expressed understanding verbally and that they would comply.     Disposition: Discharge      This note has been transcribed using voice recognition and may contain grammatical errors, misplaced words, incorrect words, incorrect phrases or other errors.         Procedure  Procedures       [1]   Past Medical History:  Diagnosis Date    Depression, unspecified     Depressive disorder    Medial meniscus tear 04/24/2023    Ocular rosacea     Personal history of other diseases of the digestive system 01/12/2016    History of esophageal reflux    Personal history of other diseases of the respiratory system 08/13/2018    History of acute sinusitis    Personal history of other endocrine, nutritional and metabolic disease     History of hypothyroidism    Personal history of other specified conditions     History of insomnia    Prediabetes     Prediabetes    Sacro-iliac pain     Tear of meniscus of right knee 04/24/2023    Ulcerative colitis    [2]   Past Surgical History:  Procedure Laterality Date    APPENDECTOMY  01/12/2016    Appendectomy    CHOLECYSTECTOMY  01/12/2016    Cholecystectomy    HERNIA REPAIR  01/12/2016    Inguinal Hernia Repair    HYSTERECTOMY  11/07/2017    Hysterectomy    MR HEAD ANGIO WO IV CONTRAST  02/06/2019    MR HEAD ANGIO WO IV CONTRAST 2/6/2019 [facility]    MR NECK ANGIO WO IV CONTRAST  02/06/2019    MR NECK ANGIO WO IV CONTRAST 2/6/2019 [facility]    OTHER SURGICAL HISTORY  08/12/2020    Knee arthroscopy    PANCREAS SURGERY      SINUS SURGERY  01/12/2016    Sinus Surgery    SPLENECTOMY, TOTAL  01/12/2016    Splenectomy    TOTAL ABDOMINAL HYSTERECTOMY W/ BILATERAL SALPINGOOPHORECTOMY  01/12/2016    Total Abdominal Hysterectomy With Removal Of Both Ovaries   [3]   Family History  Problem Relation Name Age of Onset    Arthritis Mother      Heart disease Mother      Other (CVA) Mother      Coronary artery disease Mother      Diabetes Mother      Osteoporosis Mother      Heart  failure Mother      Kidney failure Mother      Alcohol abuse Father      Hypertension Father      Lung cancer Father      Diabetes Maternal Grandmother      Liver cancer Maternal Grandmother      Throat cancer Maternal Grandfather      Osteoarthritis Other          Grandmother   [4]   Social History  Tobacco Use    Smoking status: Every Day     Types: Cigarettes    Smokeless tobacco: Never   Vaping Use    Vaping status: Never Used   Substance Use Topics    Alcohol use: Never     Comment: has not drank in 15 years due to medication    Drug use: Never        Dick Blackman PA-C  05/08/25 2099

## 2025-05-10 LAB — BACTERIA UR CULT: NORMAL

## 2025-05-12 ENCOUNTER — HOSPITAL ENCOUNTER (OUTPATIENT)
Dept: RADIOLOGY | Facility: CLINIC | Age: 60
Discharge: HOME | End: 2025-05-12
Payer: COMMERCIAL

## 2025-05-12 DIAGNOSIS — N20.0 RENAL CALCULI: ICD-10-CM

## 2025-05-12 PROCEDURE — 74018 RADEX ABDOMEN 1 VIEW: CPT | Performed by: RADIOLOGY

## 2025-05-12 PROCEDURE — 74018 RADEX ABDOMEN 1 VIEW: CPT

## 2025-05-12 NOTE — H&P (VIEW-ONLY)
Subjective   Patient ID: Paola Manley is a 60 y.o. female    HPI  60 y.o. female who presents to establish for right kidney stone. She presented to the ED on 05/08/2025 with complaints of right-sided back and flank pain. She stated that her symptoms started a few days prior but have been waxing and waning. She does have a long history of kidney stones stated that the pain felt similar. She follows Dr. Reyes for urology. She denied any fevers or chills. She denied any urinary symptoms including dysuria. Patient had nausea and vomiting. She believed it is secondary to the pain.    Today, she does not believe she has passed the kidney stone as of today. She believes she may have a stone on the left side as well.     The most recent Urine Culture, conducted on 05/08/2025, revealed:  Clinically insignificant growth based on current clinical standards.     The most recent CT abdomen pelvis wo IV contrast, conducted on 05/08/2025, revealed:  1.Severe right-sided hydronephrosis with 6 mm calculus distal  right ureter.  2.Nonobstructing left renal calculi.  3.Small degree of fluid anterior abdominal wall musculature just  above the umbilicus.      Review of Systems    All systems were reviewed. Anything negative was noted in the HPI.    Objective   Physical Exam    General: Well developed, well nourished, alert and cooperative, appears in no acute distress   Eyes: Non-injected conjunctiva, sclera clear, no proptosis   Cardiac: Extremities are warm and well perfused. No edema, cyanosis or pallor   Lungs: Breathing is easy, non-labored. Speaking in clear and complete sentences. Normal diaphragmatic movement   MSK: Ambulatory with steady gait, unassisted   Neuro: Alert and oriented to person, place, and time   Psych: Demonstrates good judgment and reason, without hallucinations, abnormal affect or abnormal behaviors   Skin: No obvious lesions, no rashes       No CVA tenderness bilaterally   No suprapubic pain or discomfort        Medical History[1]      Surgical History[2]      Assessment/Plan   Kidney stone, Cystitis, LUTS    60 y.o. female who presents for the above condition, We had a very long and extensive discussion with the patient regarding his condition.  I discussed with the patient the pathophysiology, differential diagnosis, risk factor, management of ureteral stones. Explained to the patient that the stone is most probably still present given their persistent pain and the recent CT. I gave the patient 3 options of management including observation which I discouraged given their episode of fever, the size of the location of the stone. Explained that they have less likely chance of spontaneous passage of the stone. We also discussed ESWL which would be appropriate for the size of the location of stone. We discussed at length a right ureteroscopy, laser stone fragmentation, right retrograde pyelogram, right double-J stent insertion. We discussed in detail the risk, benefit, potential complication, adverse events including hematuria, pneumaturia, pain, stent discomfort and pain, fever, chills, infection, urosepsis, I explained to the patient that most likely they need a second procedure at the first wound will be probably only a stent placement. I explained that the second procedure would be the actual laser stone fragmentation and exchange of their stent. Patient presents and elect to proceed.      - Pt denies taking any blood thinners at this time  - Pt denies any metal in his body (plates, stents, heart defibrillator, screws)    Plan:  - Urine Culture today  - Prescribed Percocet 5-325 mg as needed  - Schedule for right ureteroscopy, laser, stent 05/15/2025        E&M visit today is associated with current or anticipated ongoing medical care services related to a patient's single, serious condition or a complex condition.     5/13/2025    Scribe Attestation  By signing my name below, Patel TODD Scribe attest that  this documentation has been prepared under the direction and in the presence of Dr. Mick Brown.          [1]   Past Medical History:  Diagnosis Date    Depression, unspecified     Depressive disorder    Medial meniscus tear 04/24/2023    Ocular rosacea     Personal history of other diseases of the digestive system 01/12/2016    History of esophageal reflux    Personal history of other diseases of the respiratory system 08/13/2018    History of acute sinusitis    Personal history of other endocrine, nutritional and metabolic disease     History of hypothyroidism    Personal history of other specified conditions     History of insomnia    Prediabetes     Prediabetes    Sacro-iliac pain     Tear of meniscus of right knee 04/24/2023    Ulcerative colitis    [2]   Past Surgical History:  Procedure Laterality Date    APPENDECTOMY  01/12/2016    Appendectomy    CHOLECYSTECTOMY  01/12/2016    Cholecystectomy    HERNIA REPAIR  01/12/2016    Inguinal Hernia Repair    HYSTERECTOMY  11/07/2017    Hysterectomy    MR HEAD ANGIO WO IV CONTRAST  02/06/2019    MR HEAD ANGIO WO IV CONTRAST 2/6/2019 [facility]    MR NECK ANGIO WO IV CONTRAST  02/06/2019    MR NECK ANGIO WO IV CONTRAST 2/6/2019 [facility]    OTHER SURGICAL HISTORY  08/12/2020    Knee arthroscopy    PANCREAS SURGERY      SINUS SURGERY  01/12/2016    Sinus Surgery    SPLENECTOMY, TOTAL  01/12/2016    Splenectomy    TOTAL ABDOMINAL HYSTERECTOMY W/ BILATERAL SALPINGOOPHORECTOMY  01/12/2016    Total Abdominal Hysterectomy With Removal Of Both Ovaries

## 2025-05-13 ENCOUNTER — OFFICE VISIT (OUTPATIENT)
Dept: UROLOGY | Facility: HOSPITAL | Age: 60
End: 2025-05-13
Payer: COMMERCIAL

## 2025-05-13 ENCOUNTER — PREP FOR PROCEDURE (OUTPATIENT)
Dept: UROLOGY | Facility: HOSPITAL | Age: 60
End: 2025-05-13

## 2025-05-13 DIAGNOSIS — N20.0 KIDNEY STONE ON RIGHT SIDE: ICD-10-CM

## 2025-05-13 DIAGNOSIS — N30.90 CYSTITIS: ICD-10-CM

## 2025-05-13 DIAGNOSIS — R39.9 LOWER URINARY TRACT SYMPTOMS (LUTS): Primary | ICD-10-CM

## 2025-05-13 DIAGNOSIS — N20.0 RIGHT RENAL STONE: Primary | ICD-10-CM

## 2025-05-13 LAB
POC APPEARANCE, URINE: CLEAR
POC BILIRUBIN, URINE: NEGATIVE
POC BLOOD, URINE: ABNORMAL
POC COLOR, URINE: YELLOW
POC GLUCOSE, URINE: NEGATIVE MG/DL
POC KETONES, URINE: NEGATIVE MG/DL
POC LEUKOCYTES, URINE: ABNORMAL
POC NITRITE,URINE: NEGATIVE
POC PH, URINE: 6 PH
POC PROTEIN, URINE: ABNORMAL MG/DL
POC SPECIFIC GRAVITY, URINE: 1.02
POC UROBILINOGEN, URINE: 0.2 EU/DL

## 2025-05-13 PROCEDURE — G2211 COMPLEX E/M VISIT ADD ON: HCPCS | Performed by: STUDENT IN AN ORGANIZED HEALTH CARE EDUCATION/TRAINING PROGRAM

## 2025-05-13 PROCEDURE — 3048F LDL-C <100 MG/DL: CPT | Performed by: STUDENT IN AN ORGANIZED HEALTH CARE EDUCATION/TRAINING PROGRAM

## 2025-05-13 PROCEDURE — 99214 OFFICE O/P EST MOD 30 MIN: CPT | Performed by: STUDENT IN AN ORGANIZED HEALTH CARE EDUCATION/TRAINING PROGRAM

## 2025-05-13 PROCEDURE — 3044F HG A1C LEVEL LT 7.0%: CPT | Performed by: STUDENT IN AN ORGANIZED HEALTH CARE EDUCATION/TRAINING PROGRAM

## 2025-05-13 PROCEDURE — 99204 OFFICE O/P NEW MOD 45 MIN: CPT | Performed by: STUDENT IN AN ORGANIZED HEALTH CARE EDUCATION/TRAINING PROGRAM

## 2025-05-13 PROCEDURE — 81003 URINALYSIS AUTO W/O SCOPE: CPT | Mod: QW | Performed by: STUDENT IN AN ORGANIZED HEALTH CARE EDUCATION/TRAINING PROGRAM

## 2025-05-13 RX ORDER — OXYCODONE AND ACETAMINOPHEN 5; 325 MG/1; MG/1
1 TABLET ORAL EVERY 8 HOURS PRN
Qty: 12 TABLET | Refills: 0 | Status: SHIPPED | OUTPATIENT
Start: 2025-05-13 | End: 2025-05-16

## 2025-05-13 RX ORDER — CEFAZOLIN SODIUM 2 G/100ML
2 INJECTION, SOLUTION INTRAVENOUS ONCE
Status: CANCELLED | OUTPATIENT
Start: 2025-05-13 | End: 2025-05-13

## 2025-05-15 ENCOUNTER — APPOINTMENT (OUTPATIENT)
Dept: RADIOLOGY | Facility: HOSPITAL | Age: 60
End: 2025-05-15
Payer: COMMERCIAL

## 2025-05-15 ENCOUNTER — ANESTHESIA EVENT (OUTPATIENT)
Dept: OPERATING ROOM | Facility: HOSPITAL | Age: 60
End: 2025-05-15
Payer: COMMERCIAL

## 2025-05-15 ENCOUNTER — HOSPITAL ENCOUNTER (OUTPATIENT)
Facility: HOSPITAL | Age: 60
Setting detail: OUTPATIENT SURGERY
Discharge: HOME | End: 2025-05-15
Attending: STUDENT IN AN ORGANIZED HEALTH CARE EDUCATION/TRAINING PROGRAM | Admitting: STUDENT IN AN ORGANIZED HEALTH CARE EDUCATION/TRAINING PROGRAM
Payer: COMMERCIAL

## 2025-05-15 ENCOUNTER — ANESTHESIA (OUTPATIENT)
Dept: OPERATING ROOM | Facility: HOSPITAL | Age: 60
End: 2025-05-15
Payer: COMMERCIAL

## 2025-05-15 VITALS
SYSTOLIC BLOOD PRESSURE: 145 MMHG | BODY MASS INDEX: 33.04 KG/M2 | HEIGHT: 68 IN | RESPIRATION RATE: 13 BRPM | OXYGEN SATURATION: 94 % | TEMPERATURE: 97 F | WEIGHT: 218.03 LBS | DIASTOLIC BLOOD PRESSURE: 82 MMHG | HEART RATE: 75 BPM

## 2025-05-15 DIAGNOSIS — N20.0 RIGHT RENAL STONE: Primary | ICD-10-CM

## 2025-05-15 LAB
ATRIAL RATE: 79 BPM
GLUCOSE BLD MANUAL STRIP-MCNC: 105 MG/DL (ref 74–99)
GLUCOSE BLD MANUAL STRIP-MCNC: 76 MG/DL (ref 74–99)
P AXIS: 51 DEGREES
P OFFSET: 195 MS
P ONSET: 143 MS
PR INTERVAL: 154 MS
Q ONSET: 220 MS
QRS COUNT: 13 BEATS
QRS DURATION: 96 MS
QT INTERVAL: 410 MS
QTC CALCULATION(BAZETT): 470 MS
QTC FREDERICIA: 449 MS
R AXIS: -8 DEGREES
T AXIS: 31 DEGREES
T OFFSET: 425 MS
VENTRICULAR RATE: 79 BPM

## 2025-05-15 PROCEDURE — 52356 CYSTO/URETERO W/LITHOTRIPSY: CPT | Performed by: STUDENT IN AN ORGANIZED HEALTH CARE EDUCATION/TRAINING PROGRAM

## 2025-05-15 PROCEDURE — 82947 ASSAY GLUCOSE BLOOD QUANT: CPT

## 2025-05-15 PROCEDURE — 74420 UROGRAPHY RTRGR +-KUB: CPT | Performed by: STUDENT IN AN ORGANIZED HEALTH CARE EDUCATION/TRAINING PROGRAM

## 2025-05-15 PROCEDURE — 7100000010 HC PHASE TWO TIME - EACH INCREMENTAL 1 MINUTE: Performed by: STUDENT IN AN ORGANIZED HEALTH CARE EDUCATION/TRAINING PROGRAM

## 2025-05-15 PROCEDURE — 7100000009 HC PHASE TWO TIME - INITIAL BASE CHARGE: Performed by: STUDENT IN AN ORGANIZED HEALTH CARE EDUCATION/TRAINING PROGRAM

## 2025-05-15 PROCEDURE — 2720000007 HC OR 272 NO HCPCS: Performed by: STUDENT IN AN ORGANIZED HEALTH CARE EDUCATION/TRAINING PROGRAM

## 2025-05-15 PROCEDURE — 76000 FLUOROSCOPY <1 HR PHYS/QHP: CPT | Mod: RT

## 2025-05-15 PROCEDURE — 7100000002 HC RECOVERY ROOM TIME - EACH INCREMENTAL 1 MINUTE: Performed by: STUDENT IN AN ORGANIZED HEALTH CARE EDUCATION/TRAINING PROGRAM

## 2025-05-15 PROCEDURE — 2550000001 HC RX 255 CONTRASTS: Mod: JW | Performed by: STUDENT IN AN ORGANIZED HEALTH CARE EDUCATION/TRAINING PROGRAM

## 2025-05-15 PROCEDURE — A52356 PR CYSTO/URETERO W/LITHOTRIPSY  AND INDWELL STENT INSRT

## 2025-05-15 PROCEDURE — C1769 GUIDE WIRE: HCPCS | Performed by: STUDENT IN AN ORGANIZED HEALTH CARE EDUCATION/TRAINING PROGRAM

## 2025-05-15 PROCEDURE — 2500000005 HC RX 250 GENERAL PHARMACY W/O HCPCS: Performed by: STUDENT IN AN ORGANIZED HEALTH CARE EDUCATION/TRAINING PROGRAM

## 2025-05-15 PROCEDURE — 2500000004 HC RX 250 GENERAL PHARMACY W/ HCPCS (ALT 636 FOR OP/ED): Mod: JZ

## 2025-05-15 PROCEDURE — 3600000008 HC OR TIME - EACH INCREMENTAL 1 MINUTE - PROCEDURE LEVEL THREE: Performed by: STUDENT IN AN ORGANIZED HEALTH CARE EDUCATION/TRAINING PROGRAM

## 2025-05-15 PROCEDURE — 3700000001 HC GENERAL ANESTHESIA TIME - INITIAL BASE CHARGE: Performed by: STUDENT IN AN ORGANIZED HEALTH CARE EDUCATION/TRAINING PROGRAM

## 2025-05-15 PROCEDURE — 2780000003 HC OR 278 NO HCPCS: Performed by: STUDENT IN AN ORGANIZED HEALTH CARE EDUCATION/TRAINING PROGRAM

## 2025-05-15 PROCEDURE — 3600000003 HC OR TIME - INITIAL BASE CHARGE - PROCEDURE LEVEL THREE: Performed by: STUDENT IN AN ORGANIZED HEALTH CARE EDUCATION/TRAINING PROGRAM

## 2025-05-15 PROCEDURE — 7100000001 HC RECOVERY ROOM TIME - INITIAL BASE CHARGE: Performed by: STUDENT IN AN ORGANIZED HEALTH CARE EDUCATION/TRAINING PROGRAM

## 2025-05-15 PROCEDURE — C2617 STENT, NON-COR, TEM W/O DEL: HCPCS | Performed by: STUDENT IN AN ORGANIZED HEALTH CARE EDUCATION/TRAINING PROGRAM

## 2025-05-15 PROCEDURE — 82365 CALCULUS SPECTROSCOPY: CPT | Performed by: STUDENT IN AN ORGANIZED HEALTH CARE EDUCATION/TRAINING PROGRAM

## 2025-05-15 PROCEDURE — 2500000002 HC RX 250 W HCPCS SELF ADMINISTERED DRUGS (ALT 637 FOR MEDICARE OP, ALT 636 FOR OP/ED): Performed by: ANESTHESIOLOGY

## 2025-05-15 PROCEDURE — 2500000004 HC RX 250 GENERAL PHARMACY W/ HCPCS (ALT 636 FOR OP/ED): Mod: JZ | Performed by: ANESTHESIOLOGY

## 2025-05-15 PROCEDURE — 2500000001 HC RX 250 WO HCPCS SELF ADMINISTERED DRUGS (ALT 637 FOR MEDICARE OP): Performed by: ANESTHESIOLOGY

## 2025-05-15 PROCEDURE — 3700000002 HC GENERAL ANESTHESIA TIME - EACH INCREMENTAL 1 MINUTE: Performed by: STUDENT IN AN ORGANIZED HEALTH CARE EDUCATION/TRAINING PROGRAM

## 2025-05-15 PROCEDURE — A52356 PR CYSTO/URETERO W/LITHOTRIPSY  AND INDWELL STENT INSRT: Performed by: ANESTHESIOLOGY

## 2025-05-15 DEVICE — URETERAL STENT WITH SIDE HOLES 6FX26CM
Type: IMPLANTABLE DEVICE | Site: URETER | Status: FUNCTIONAL
Brand: TRIA™ SOFT

## 2025-05-15 RX ORDER — CEFAZOLIN SODIUM 2 G/50ML
2 SOLUTION INTRAVENOUS ONCE
Status: DISCONTINUED | OUTPATIENT
Start: 2025-05-15 | End: 2025-05-15 | Stop reason: HOSPADM

## 2025-05-15 RX ORDER — FENTANYL CITRATE 50 UG/ML
INJECTION, SOLUTION INTRAMUSCULAR; INTRAVENOUS AS NEEDED
Status: DISCONTINUED | OUTPATIENT
Start: 2025-05-15 | End: 2025-05-15

## 2025-05-15 RX ORDER — CEPHALEXIN 500 MG/1
500 CAPSULE ORAL 2 TIMES DAILY
Qty: 6 CAPSULE | Refills: 0 | Status: SHIPPED | OUTPATIENT
Start: 2025-05-15 | End: 2025-05-18

## 2025-05-15 RX ORDER — OXYBUTYNIN CHLORIDE 5 MG/1
5 TABLET ORAL ONCE
Status: COMPLETED | OUTPATIENT
Start: 2025-05-15 | End: 2025-05-15

## 2025-05-15 RX ORDER — HYDROMORPHONE HYDROCHLORIDE 1 MG/ML
0.4 INJECTION, SOLUTION INTRAMUSCULAR; INTRAVENOUS; SUBCUTANEOUS EVERY 30 MIN PRN
Refills: 0 | Status: DISCONTINUED | OUTPATIENT
Start: 2025-05-15 | End: 2025-05-15 | Stop reason: HOSPADM

## 2025-05-15 RX ORDER — DROPERIDOL 2.5 MG/ML
0.62 INJECTION, SOLUTION INTRAMUSCULAR; INTRAVENOUS ONCE AS NEEDED
Status: DISCONTINUED | OUTPATIENT
Start: 2025-05-15 | End: 2025-05-15 | Stop reason: HOSPADM

## 2025-05-15 RX ORDER — OXYBUTYNIN CHLORIDE 5 MG/1
10 TABLET, EXTENDED RELEASE ORAL DAILY PRN
Qty: 15 TABLET | Refills: 1 | Status: SHIPPED | OUTPATIENT
Start: 2025-05-15 | End: 2025-05-30

## 2025-05-15 RX ORDER — ROCURONIUM BROMIDE 10 MG/ML
INJECTION, SOLUTION INTRAVENOUS AS NEEDED
Status: DISCONTINUED | OUTPATIENT
Start: 2025-05-15 | End: 2025-05-15

## 2025-05-15 RX ORDER — MIDAZOLAM HYDROCHLORIDE 1 MG/ML
INJECTION INTRAMUSCULAR; INTRAVENOUS AS NEEDED
Status: DISCONTINUED | OUTPATIENT
Start: 2025-05-15 | End: 2025-05-15

## 2025-05-15 RX ORDER — PROPOFOL 10 MG/ML
INJECTION, EMULSION INTRAVENOUS AS NEEDED
Status: DISCONTINUED | OUTPATIENT
Start: 2025-05-15 | End: 2025-05-15

## 2025-05-15 RX ORDER — FENTANYL CITRATE 50 UG/ML
50 INJECTION, SOLUTION INTRAMUSCULAR; INTRAVENOUS EVERY 5 MIN PRN
Status: DISCONTINUED | OUTPATIENT
Start: 2025-05-15 | End: 2025-05-15 | Stop reason: HOSPADM

## 2025-05-15 RX ORDER — LIDOCAINE HYDROCHLORIDE 20 MG/ML
JELLY TOPICAL AS NEEDED
Status: DISCONTINUED | OUTPATIENT
Start: 2025-05-15 | End: 2025-05-15 | Stop reason: HOSPADM

## 2025-05-15 RX ORDER — FENTANYL CITRATE 50 UG/ML
12.5 INJECTION, SOLUTION INTRAMUSCULAR; INTRAVENOUS EVERY 5 MIN PRN
Status: DISCONTINUED | OUTPATIENT
Start: 2025-05-15 | End: 2025-05-15 | Stop reason: HOSPADM

## 2025-05-15 RX ORDER — LIDOCAINE HYDROCHLORIDE 10 MG/ML
0.1 INJECTION, SOLUTION EPIDURAL; INFILTRATION; INTRACAUDAL; PERINEURAL ONCE
Status: DISCONTINUED | OUTPATIENT
Start: 2025-05-15 | End: 2025-05-15 | Stop reason: HOSPADM

## 2025-05-15 RX ORDER — LIDOCAINE HYDROCHLORIDE 20 MG/ML
INJECTION, SOLUTION EPIDURAL; INFILTRATION; INTRACAUDAL; PERINEURAL AS NEEDED
Status: DISCONTINUED | OUTPATIENT
Start: 2025-05-15 | End: 2025-05-15

## 2025-05-15 RX ORDER — PHENAZOPYRIDINE HYDROCHLORIDE 100 MG/1
95 TABLET, FILM COATED ORAL
Status: DISCONTINUED | OUTPATIENT
Start: 2025-05-15 | End: 2025-05-15 | Stop reason: HOSPADM

## 2025-05-15 RX ORDER — FENTANYL CITRATE 50 UG/ML
25 INJECTION, SOLUTION INTRAMUSCULAR; INTRAVENOUS EVERY 5 MIN PRN
Status: DISCONTINUED | OUTPATIENT
Start: 2025-05-15 | End: 2025-05-15 | Stop reason: HOSPADM

## 2025-05-15 RX ORDER — OXYCODONE HYDROCHLORIDE 5 MG/1
5 TABLET ORAL ONCE
Status: COMPLETED | OUTPATIENT
Start: 2025-05-15 | End: 2025-05-15

## 2025-05-15 RX ORDER — SODIUM CHLORIDE, SODIUM LACTATE, POTASSIUM CHLORIDE, CALCIUM CHLORIDE 600; 310; 30; 20 MG/100ML; MG/100ML; MG/100ML; MG/100ML
INJECTION, SOLUTION INTRAVENOUS CONTINUOUS PRN
Status: DISCONTINUED | OUTPATIENT
Start: 2025-05-15 | End: 2025-05-15

## 2025-05-15 RX ORDER — ONDANSETRON HYDROCHLORIDE 2 MG/ML
INJECTION, SOLUTION INTRAVENOUS AS NEEDED
Status: DISCONTINUED | OUTPATIENT
Start: 2025-05-15 | End: 2025-05-15

## 2025-05-15 RX ORDER — PHENAZOPYRIDINE HYDROCHLORIDE 200 MG/1
200 TABLET, FILM COATED ORAL 3 TIMES DAILY
Qty: 15 TABLET | Refills: 0 | Status: SHIPPED | OUTPATIENT
Start: 2025-05-15 | End: 2025-05-20

## 2025-05-15 RX ORDER — ONDANSETRON HYDROCHLORIDE 2 MG/ML
4 INJECTION, SOLUTION INTRAVENOUS ONCE AS NEEDED
Status: COMPLETED | OUTPATIENT
Start: 2025-05-15 | End: 2025-05-15

## 2025-05-15 RX ORDER — SODIUM CHLORIDE, SODIUM LACTATE, POTASSIUM CHLORIDE, CALCIUM CHLORIDE 600; 310; 30; 20 MG/100ML; MG/100ML; MG/100ML; MG/100ML
50 INJECTION, SOLUTION INTRAVENOUS CONTINUOUS
Status: ACTIVE | OUTPATIENT
Start: 2025-05-15 | End: 2025-05-15

## 2025-05-15 RX ORDER — CEFAZOLIN 1 G/1
INJECTION, POWDER, FOR SOLUTION INTRAVENOUS AS NEEDED
Status: DISCONTINUED | OUTPATIENT
Start: 2025-05-15 | End: 2025-05-15

## 2025-05-15 RX ADMIN — CEFAZOLIN 2 G: 1 INJECTION, POWDER, FOR SOLUTION INTRAMUSCULAR; INTRAVENOUS at 12:35

## 2025-05-15 RX ADMIN — SODIUM CHLORIDE, POTASSIUM CHLORIDE, SODIUM LACTATE AND CALCIUM CHLORIDE: 600; 310; 30; 20 INJECTION, SOLUTION INTRAVENOUS at 12:23

## 2025-05-15 RX ADMIN — FENTANYL CITRATE 50 MCG: 0.05 INJECTION, SOLUTION INTRAMUSCULAR; INTRAVENOUS at 13:46

## 2025-05-15 RX ADMIN — DEXAMETHASONE SODIUM PHOSPHATE 8 MG: 4 INJECTION, SOLUTION INTRAMUSCULAR; INTRAVENOUS at 12:40

## 2025-05-15 RX ADMIN — OXYCODONE HYDROCHLORIDE 5 MG: 5 TABLET ORAL at 13:59

## 2025-05-15 RX ADMIN — FENTANYL CITRATE 50 MCG: 50 INJECTION, SOLUTION INTRAMUSCULAR; INTRAVENOUS at 12:30

## 2025-05-15 RX ADMIN — PROPOFOL 150 MG: 10 INJECTION, EMULSION INTRAVENOUS at 12:30

## 2025-05-15 RX ADMIN — SUGAMMADEX 400 MG: 100 INJECTION, SOLUTION INTRAVENOUS at 13:00

## 2025-05-15 RX ADMIN — MIDAZOLAM HYDROCHLORIDE 2 MG: 1 INJECTION, SOLUTION INTRAMUSCULAR; INTRAVENOUS at 12:23

## 2025-05-15 RX ADMIN — LIDOCAINE HYDROCHLORIDE 40 MG: 20 INJECTION, SOLUTION EPIDURAL; INFILTRATION; INTRACAUDAL; PERINEURAL at 12:31

## 2025-05-15 RX ADMIN — ONDANSETRON 4 MG: 2 INJECTION, SOLUTION INTRAMUSCULAR; INTRAVENOUS at 12:40

## 2025-05-15 RX ADMIN — ROCURONIUM BROMIDE 60 MG: 10 INJECTION INTRAVENOUS at 12:31

## 2025-05-15 RX ADMIN — OXYBUTYNIN CHLORIDE 5 MG: 5 TABLET ORAL at 15:43

## 2025-05-15 RX ADMIN — FENTANYL CITRATE 50 MCG: 0.05 INJECTION, SOLUTION INTRAMUSCULAR; INTRAVENOUS at 13:33

## 2025-05-15 RX ADMIN — HYDROMORPHONE HYDROCHLORIDE 0.4 MG: 1 INJECTION, SOLUTION INTRAMUSCULAR; INTRAVENOUS; SUBCUTANEOUS at 15:12

## 2025-05-15 RX ADMIN — ONDANSETRON 4 MG: 2 INJECTION, SOLUTION INTRAMUSCULAR; INTRAVENOUS at 13:33

## 2025-05-15 SDOH — HEALTH STABILITY: MENTAL HEALTH: CURRENT SMOKER: 1

## 2025-05-15 ASSESSMENT — PAIN - FUNCTIONAL ASSESSMENT
PAIN_FUNCTIONAL_ASSESSMENT: 0-10
PAIN_FUNCTIONAL_ASSESSMENT: 0-10
PAIN_FUNCTIONAL_ASSESSMENT: UNABLE TO SELF-REPORT
PAIN_FUNCTIONAL_ASSESSMENT: 0-10
PAIN_FUNCTIONAL_ASSESSMENT: UNABLE TO SELF-REPORT
PAIN_FUNCTIONAL_ASSESSMENT: 0-10

## 2025-05-15 ASSESSMENT — PAIN SCALES - GENERAL
PAIN_LEVEL: 2
PAINLEVEL_OUTOF10: 9
PAINLEVEL_OUTOF10: 10 - WORST POSSIBLE PAIN
PAINLEVEL_OUTOF10: 5 - MODERATE PAIN
PAINLEVEL_OUTOF10: 7
PAINLEVEL_OUTOF10: 9
PAINLEVEL_OUTOF10: 0 - NO PAIN
PAINLEVEL_OUTOF10: 10 - WORST POSSIBLE PAIN
PAINLEVEL_OUTOF10: 9
PAINLEVEL_OUTOF10: 5 - MODERATE PAIN
PAINLEVEL_OUTOF10: 5 - MODERATE PAIN
PAINLEVEL_OUTOF10: 10 - WORST POSSIBLE PAIN
PAINLEVEL_OUTOF10: 7
PAINLEVEL_OUTOF10: 10 - WORST POSSIBLE PAIN

## 2025-05-15 ASSESSMENT — PAIN DESCRIPTION - DESCRIPTORS
DESCRIPTORS: CRAMPING;DISCOMFORT
DESCRIPTORS: DISCOMFORT
DESCRIPTORS: DISCOMFORT
DESCRIPTORS: SHARP
DESCRIPTORS: DISCOMFORT
DESCRIPTORS: DISCOMFORT

## 2025-05-15 NOTE — OP NOTE
Cystoscopy; Right Ureteroscopy; Holmium Laser Lithotripsy; Retrograde Pyelograms; Ureteral Stent Insertion (R) Operative Note     Date: 5/15/2025  OR Location: [facility]    Name: Paola Manley : 1965, Age: 60 y.o., MRN: [id number], Sex: female    Diagnosis  Pre-op Diagnosis      * Right renal stone [N20.0] Post-op Diagnosis     * Right renal stone [N20.0]     Procedures  Cystoscopy; Right Ureteroscopy; Holmium Laser Lithotripsy; Retrograde Pyelograms; Ureteral Stent Insertion  80623 - CA CYSTO/URETERO W/LITHOTRIPSY &INDWELL STENT INSRT    Cystoscopy; Right Ureteroscopy; Holmium Laser Lithotripsy; Retrograde Pyelograms; Ureteral Stent Insertion  23888 - CHG UROGRAPHY RETROGRADE WITH/WO KUB      Surgeons      * Mick Brown - Primary    Resident/Fellow/Other Assistant:  Surgeons and Role:  * No surgeons found with a matching role *    Staff:   Circulator: Nika Carreno Person: Daquan Naylor Circulator: Kita    Anesthesia Staff: Anesthesiologist: Long Smiley MD  C-AA: JOSE Tran    Procedure Summary  Anesthesia: General  ASA: III  Estimated Blood Loss: 5mL  Intra-op Medications:   Administrations occurring from 1200 to 1320 on 05/15/25:   Medication Name Total Dose   lidocaine 2 % mucosal jelly (Uro-Jet) 1 Application   iohexol (OMNIPaque) 300 mg iodine/mL solution 10 mL   ceFAZolin (Ancef) vial 1 g 2 g   dexAMETHasone (Decadron) 4 mg/mL IV Syringe 2 mL 8 mg   fentaNYL (Sublimaze) injection 50 mcg/mL 50 mcg   lactated Ringer's infusion Cannot be calculated   lidocaine PF (Xylocaine-MPF) local injection 2 % 40 mg   midazolam PF (Versed) injection 1 mg/mL 2 mg   ondansetron (Zofran) 2 mg/mL injection 4 mg   propofol (Diprivan) injection 10 mg/mL 150 mg   rocuronium (ZeMuron) 50 mg/5 mL injection 60 mg   sugammadex (Bridion) 200 mg/2 mL injection 400 mg              Anesthesia Record               Intraprocedure I/O Totals          Intake    lactated Ringer's 700.00 mL    Total Intake 700 mL           Specimen:   ID Type Source Tests Collected by Time   A : RIGHT URETERAL STONE Calculus Ureter, Right CALCULI (STONE) ANALYSIS Mick Brown MD MPH 5/15/2025 1258                 Drains and/or Catheters: * None in log *    Tourniquet Times:         Implants:  Implants       Type Name Action Serial No.      Implant STENT, TRIA URETHERAL, SOFT, 6 X  26 - [id number] Implanted                 Indications: Paola Manley is an 60 y.o. female who is having surgery for Right renal stone [N20.0].     The patient was seen in the preoperative area. The risks, benefits, complications, treatment options, non-operative alternatives, expected recovery and outcomes were discussed with the patient. The possibilities of reaction to medication, pulmonary aspiration, injury to surrounding structures, bleeding, recurrent infection, the need for additional procedures, failure to diagnose a condition, and creating a complication requiring transfusion or operation were discussed with the patient. The patient concurred with the proposed plan, giving informed consent.  The site of surgery was properly noted/marked if necessary per policy. The patient has been actively warmed in preoperative area. Preoperative antibiotics have been ordered and given within 1 hours of incision. Venous thrombosis prophylaxis have been ordered including bilateral sequential compression devices    Procedure Details:     Patient consented to procedure in preoperative area. Risks and benefits discussed. Patient was marked on the left side. Allergies were reviewed and preoperative antibiotics were administered. Patient was brought to the operating room and placed in supine position on the operating room table. A timeout was performed. All were in agreement. Patient underwent general anesthesia without complication. They were repositioned in dorsal lithotomy and prepped and draped in the usual sterile fashion.      A 22 fr rigid cystoscope was used to perform  cystourethroscopy. Urethra was normal.  Sediment within the bladder was evacuated to aid in visualization. UOs were in normal orthotopic position. No bladder masses were identified.  Through the right UO stent, a sensor wire was placed through the ureter to the level of the kidney as confirmed on fluoroscopy. The stent was then removed. A 5Fr open ended ureteral catheter was placed over a second wire. Retrograde pyelogram was performed.      Retrograde pyelogram interpretation: Ureter and kidney with severe hydronephrosis, Large filling defect noted in distal ureter      A second wire was advanced to the kidney. Once wire was secured as a safety wire. Rigid scope used. A 365 micron holmium laser fiber was used to fragment all stones. Ureteroscopy was repeated to confirm no large stone fragments remained. Stone basket was used to extract small fragments.  No ureteral stones were noted on ureteroscopy. A 6x 26 JJ stent was placed over the safety wire with proximal curl noted in kidney on fluoro and distal curl in the bladder on direct visualization. Bladder was drained, instruments removed. This concluded the procedure. Patient was awoken from anesthesia without complication and transferred to PACU in stable condition.     Evidence of Infection: No   Complications:  None; patient tolerated the procedure well.    Disposition: PACU - hemodynamically stable.  Condition: stable         Additional Details:   - Will see back in clinic in ~ 3 week for stent removal       Attending Attestation:     Mick Brown  Phone Number: [phone]

## 2025-05-15 NOTE — ANESTHESIA POSTPROCEDURE EVALUATION
Patient: Paola Manley    Procedure Summary       Date: 05/15/25 Room / Location: [facility] 06 / [facility]    Anesthesia Start: 1223 Anesthesia Stop: 1314    Procedure: Cystoscopy; Right Ureteroscopy; Holmium Laser Lithotripsy; Retrograde Pyelograms; Ureteral Stent Insertion (Right) Diagnosis:       Right renal stone      (Right renal stone [N20.0])    Surgeons: Mick Brown MD MPH Responsible Provider: Long Smiley MD    Anesthesia Type: Not recorded ASA Status: 3            Anesthesia Type: No value filed.    Vitals Value Taken Time   /73 05/15/25 13:47   Temp 36.5 °C (97.7 °F) 05/15/25 13:11   Pulse 75 05/15/25 13:58   Resp 16 05/15/25 13:45   SpO2 97 % 05/15/25 13:58   Vitals shown include unfiled device data.    Anesthesia Post Evaluation    Patient location during evaluation: bedside  Patient participation: complete - patient participated  Level of consciousness: awake and alert  Pain score: 2  Pain management: adequate  Airway patency: patent  Cardiovascular status: acceptable, hemodynamically stable and stable  Respiratory status: acceptable and face mask  Hydration status: acceptable  Postoperative Nausea and Vomiting: none        No notable events documented.

## 2025-05-15 NOTE — ANESTHESIA PREPROCEDURE EVALUATION
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  01/12/2016: Personal history of other diseases of the digestive system      Comment:  History of esophageal reflux  08/13/2018: Personal history of other diseases of the respiratory   system      Comment:  History of acute sinusitis  No date: Personal history of other endocrine, nutritional and   metabolic disease      Comment:  History of hypothyroidism  No date: Personal history of other specified conditions      Comment:  History of insomnia  No date: Prediabetes      Comment:  Prediabetes  No date: Sacro-iliac pain  04/24/2023: Tear of meniscus of right knee  No date: Ulcerative colitis      Past Surgical History:  01/12/2016: APPENDECTOMY      Comment:  Appendectomy  01/12/2016: CHOLECYSTECTOMY      Comment:  Cholecystectomy  01/12/2016: HERNIA REPAIR      Comment:  Inguinal Hernia Repair  11/07/2017: HYSTERECTOMY      Comment:  Hysterectomy  02/06/2019: MR HEAD ANGIO WO IV CONTRAST      Comment:  MR HEAD ANGIO WO IV CONTRAST 2/6/2019 [facility]                [facility]  02/06/2019: MR NECK ANGIO WO IV CONTRAST      Comment:  MR NECK ANGIO WO IV CONTRAST 2/6/2019 [facility]                [facility]  08/12/2020: OTHER SURGICAL HISTORY      Comment:  Knee arthroscopy  No date: PANCREAS SURGERY  01/12/2016: SINUS SURGERY      Comment:  Sinus Surgery  01/12/2016: SPLENECTOMY, TOTAL      Comment:  Splenectomy  01/12/2016: TOTAL ABDOMINAL HYSTERECTOMY W/ BILATERAL   SALPINGOOPHORECTOMY      Comment:  Total Abdominal Hysterectomy With Removal  Of Both                Ovaries        Cardiac  EKG 5/8/25:    Normal sinus rhythm with sinus arrhythmia  Normal ECG  When compared with ECG of 26-APR-2023 01:09,  Previous ECG has undetermined rhythm, needs review      (+) Hyperlipidemia      Pulmonary  Social History    Tobacco Use      Smoking status: Every Day        Packs/day: 0.50        Years: 0.5 packs/day for 8.0 years (4.0 ttl pk-yrs)        Types: Cigarettes      Smokeless tobacco: Never          Neuro   (+) Anxiety   (+) Cervical radiculitis   (+) Depression      GI   (+) GERD (gastroesophageal reflux disease)   (+) Ulcerative colitis      /Renal   (+) Renal calculi   (+) Right renal stone      Endocrine   (+) Diabetes mellitus type 2, uncomplicated   (+) Hypothyroidism   (+) Obesity      Hematology   (+) Anemia      Musculoskeletal   (+) Fibromyalgia   (+) Spondylosis of lumbosacral region       Clinical information reviewed:                   NPO Detail:  No data recorded       ALLERGIES:  Allergies[1]     MEDICAL HISTORY:  Medical History[2]     Relevant Problems   Anesthesia  Past Medical History:  No date: Depression, unspecified      Comment:  Depressive disorder  04/24/2023: Medial meniscus tear  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  01/12/2016: Personal history of other diseases of the digestive system      Comment:  History of esophageal reflux  08/13/2018: Personal history of other diseases of the respiratory   system      Comment:  History of acute sinusitis  No date: Personal history of other endocrine, nutritional and   metabolic disease      Comment:  History of hypothyroidism  No date: Personal history of other specified conditions      Comment:  History of insomnia  No date: Prediabetes      Comment:  Prediabetes  No date: Sacro-iliac pain  04/24/2023: Tear of meniscus of right knee  No date: Ulcerative colitis      Past Surgical History:  01/12/2016: APPENDECTOMY      Comment:  Appendectomy  01/12/2016: CHOLECYSTECTOMY      Comment:   Cholecystectomy  01/12/2016: HERNIA REPAIR      Comment:  Inguinal Hernia Repair  11/07/2017: HYSTERECTOMY      Comment:  Hysterectomy  02/06/2019: MR HEAD ANGIO WO IV CONTRAST      Comment:  MR HEAD ANGIO WO IV CONTRAST 2/6/2019 [facility]                [facility]  02/06/2019: MR NECK ANGIO WO IV CONTRAST      Comment:  MR NECK ANGIO WO IV CONTRAST 2/6/2019 [facility]                [facility]  08/12/2020: OTHER SURGICAL HISTORY      Comment:  Knee arthroscopy  No date: PANCREAS SURGERY  01/12/2016: SINUS SURGERY      Comment:  Sinus Surgery  01/12/2016: SPLENECTOMY, TOTAL      Comment:  Splenectomy  01/12/2016: TOTAL ABDOMINAL HYSTERECTOMY W/ BILATERAL   SALPINGOOPHORECTOMY      Comment:  Total Abdominal Hysterectomy With Removal Of Both                Ovaries        Cardiac  EKG 5/8/25:    Normal sinus rhythm with sinus arrhythmia  Normal ECG  When compared with ECG of 26-APR-2023 01:09,  Previous ECG has undetermined rhythm, needs review      (+) Hyperlipidemia      Pulmonary  Social History    Tobacco Use      Smoking status: Every Day        Packs/day: 0.50        Years: 0.5 packs/day for 8.0 years (4.0 ttl pk-yrs)        Types: Cigarettes      Smokeless tobacco: Never          Neuro   (+) Anxiety   (+) Cervical radiculitis   (+) Depression      GI   (+) GERD (gastroesophageal reflux disease)   (+) Ulcerative colitis      /Renal   (+) Renal calculi   (+) Right renal stone      Endocrine   (+) Diabetes mellitus type 2, uncomplicated   (+) Hypothyroidism   (+) Obesity      Hematology   (+) Anemia      Musculoskeletal   (+) Fibromyalgia   (+) Spondylosis of lumbosacral region        SURGICAL HISTORY:  Surgical History[3]     MEDICATIONS:  Current Outpatient Medications   Medication Instructions    azelastine (Astelin) 137 mcg (0.1 %) nasal spray 1 spray, Each Nostril, 2 times daily    buPROPion XL (WELLBUTRIN XL) 150 mg, Daily before breakfast    cefuroxime (CEFTIN) 500 mg, oral, 2 times daily    cloNIDine  (Catapres) 0.2 mg tablet TAKE ONE (1) TABLET AT BEDTIME    cyanocobalamin (VITAMIN B-12) 1,000 mcg, intramuscular, Every 30 days    diazePAM (VALIUM) 2 mg, oral, Once    diclofenac sodium (VOLTAREN) 4 g, Topical, 4 times daily, Apply 2-4 gm to affected area    doxycycline (ADOXA) 150 mg, Daily    DULoxetine (CYMBALTA) 30 mg, oral, Daily, To be taken in combination with duloxetine 60 mg daily for a total of 90 mg daily    DULoxetine (CYMBALTA) 60 mg, oral, Daily, To be taken in combination with duloxetine 30 mg daily for total dose of 90 mg daily    fluticasone (Flonase) 50 mcg/actuation nasal spray 1 spray, Each Nostril, 2 times daily    hydrOXYzine pamoate (VISTARIL) 25 mg, 3 times daily PRN    levothyroxine (SYNTHROID, LEVOXYL) 88 mcg, oral, Daily, Please take by mouth every other day in an alternating fashion opposite of the 100 mcg    Linzess 290 mcg, oral, Daily before breakfast, DO NOT CRUSH OR CHEW    melatonin 10 mg capsule TAKE 1 CAPSULE Bedtime PRN    metaxalone (Skelaxin) 800 mg tablet TAKE ONE-HALF (1/2) TABLET TWO (2) TIMES A DAY AS NEEDED FOR MUSCLE SPASM    Mounjaro 15 mg, subcutaneous, Once Weekly    ondansetron ODT (ZOFRAN-ODT) 4 mg, oral, Every 8 hours PRN    oxyCODONE-acetaminophen (Percocet) 5-325 mg tablet 1 tablet, oral, Every 8 hours PRN    pantoprazole (PROTONIX) 40 mg, oral, Daily    pregabalin (LYRICA) 200 mg, oral, 3 times daily    rosuvastatin (CRESTOR) 10 mg, oral, Daily    sodium hyaluronate (Durolane) 60 mg/3 mL injection INJECTION 3ML, ONE DOSE INTO BOTH KNEES ONE TIME    triamcinolone (Kenalog) 0.1 % cream APPLY TO LEGS TWO (2) TIMES A DAY AS NEEDED    Vyvanse 20 mg, Daily        VITALS:      5/8/2025    11:54 PM 5/8/2025    11:00 PM 5/8/2025    10:00 PM   Vitals   Systolic 147 149 152   Diastolic 88 91 77   BP Location Left arm     Heart Rate 82 82 80   Temp 37 °C (98.6 °F)     Resp 18 18 18       LABS:   BMP   Lab Results   Component Value Date    GLUCOSE 96 05/08/2025    CALCIUM  9.7 05/08/2025     05/08/2025    K 3.5 05/08/2025    CO2 25 05/08/2025     05/08/2025    BUN 19 05/08/2025    CREATININE 0.95 05/08/2025   , BMP Extended  Results from last 7 days   Lab Units 05/08/25 2034   SODIUM mmol/L 141   POTASSIUM mmol/L 3.5   CHLORIDE mmol/L 104   CO2 mmol/L 25   BUN mg/dL 19   CREATININE mg/dL 0.95   EGFR mL/min/1.73m*2 69   GLUCOSE mg/dL 96   CALCIUM mg/dL 9.7    , LFT   Lab Results   Component Value Date    ALT 14 05/08/2025    AST 16 05/08/2025    ALKPHOS 84 05/08/2025    BILITOT 0.5 05/08/2025   , MELD Computed MELD 3.0 unavailable. One or more values for this score either were not found within the given timeframe or did not fit some other criterion.  Computed MELD-Na unavailable. One or more values for this score either were not found within the given timeframe or did not fit some other criterion.  , CBC  Lab Results   Component Value Date    WBC 12.1 (H) 05/08/2025    HGB 13.7 05/08/2025    HCT 39.7 05/08/2025    MCV 96 05/08/2025     05/08/2025          , CBC Extended       Latest Ref Rng & Units 5/8/2025     8:34 PM 1/17/2025     3:33 PM 6/5/2024     9:44 AM 4/26/2023     1:05 AM 1/24/2023     4:00 PM 9/14/2022     6:39 AM 9/13/2022     6:42 AM   CBC   Hb 12.0 - 16.0 g/dL 13.7  12.9  13.7  11.6  12.6  11.3  11.8    Hct 36.0 - 46.0 % 39.7  40.6  42.3  35.3  38.9  35.9  37.2    MCV 80 - 100 fL 96  99  99  98  97  100  99    RDW 11.5 - 14.5 % 13.9  16.3  14.6  15.3  14.8  16.5  16.7      , Coags   Lab Results   Component Value Date/Time    PROTIME 11.4 09/13/2022 0642    INR 1.0 09/13/2022 0642    APTT 33 12/23/2019 1629      , Coags Extendied   , A1C   Lab Results   Component Value Date    HGBA1C 5.3 01/17/2025   , ABG   , ABG Extended          IMAGES:  EKG          Encounter Date: 05/08/25   ECG 12 lead   Result Value    Ventricular Rate 79    Atrial Rate 79    CO Interval 154    QRS Duration 96    QT Interval 410    QTC Calculation(Bazett) 470    P Axis 51    R  Axis -8    T Axis 31    QRS Count 13    Q Onset 220    P Onset 143    P Offset 195    T Offset 425    QTC Fredericia 449    Narrative    Normal sinus rhythm with sinus arrhythmia  Normal ECG  When compared with ECG of 26-APR-2023 01:09,  Previous ECG has undetermined rhythm, needs review      , ECHO       No results found for this or any previous visit from the past 730 days.    , CARDIAC CATH      No results found for this or any previous visit from the past 730 days.   , CXR     No results found for this or any previous visit from the past 730 days.    , CT Head/Neck       CT cervical spine wo IV contrast     Narrative  Interpreted By:  MARII JUAREZ MD  MRN: [id number]  Patient Name: MARY ORONA    STUDY:  CT C-SPINE WO CONTRAST;  4/26/2023 1:54 am    INDICATION:  syncope .    COMPARISON:  None.    ACCESSION NUMBER(S):  [id number]    ORDERING CLINICIAN:  SYLVIE STARR    TECHNIQUE:  Contiguous axial images of the cervical spine were obtained without  intravenous contrast. Coronal and sagittal reformatted images were  obtained from the axial images.    FINDINGS:  No evidence acute fracture of the cervical spine. There is  straightening of the normal cervical lordosis. There is degenerative  change of the cervical spine with mild multilevel intervertebral disc  space narrowing and mild anterior osseous spurring. There is limited  evaluation of the soft tissues of the spinal canal. There is mild  multilevel posterior osseous spurring and disc protrusion. No  significant prevertebral soft tissue edema.    Impression  No evidence of acute fracture of the cervical spine.    Straightening of the normal cervical lordosis which may be secondary  to patient positioning or muscle spasm.    Mild degenerative change of the cervical spine.    , CT Chest        CT cardiac scoring wo IV contrast 02/26/2025    Narrative  Interpreted By:  Deep Stock and Glidden Michael  STUDY:  CT CARDIAC SCORING WO IV CONTRAST; 2/26/2025  9:43 am    INDICATION:  Signs/Symptoms:Hyperlipidemia; Screening.    COMPARISON:  None.    ACCESSION NUMBER(S):  [id number]    ORDERING CLINICIAN:  ANTE PLETIKOSIC    TECHNIQUE:  Using prospective ECG gating, CT scan of the coronary arteries was  performed without intravenous contrast. Coronary calcium scoring  was  performed according to the method of Agatston.    FINDINGS:  The score and distribution of calcium in the coronary arteries is as  follows:    LM 0  LAD 1  LCx 0  RCA 0    Total 1    The visualized ascending thoracic aorta measures 3.1 cm in diameter.  The heart is normal in size. No pericardial effusion is present.    No gross evidence of mediastinal or hilar lymphadenopathy or masses  is identified. The visualized segments of the lungs are normally  expanded. Small noncalcified pleural associated pulmonary nodule  measuring 2.0 mm in the left lower lobe (image 45 of 64).  Noncalcified pulmonary nodule in the anterior right upper lobe  measuring 4.0 mm (image 18 of 64). Noncalcified 2-3 mm nodule noted  in the right lower lung field (    The main pulmonary artery, right and left pulmonary artery are normal  in size. Image 30 of 64)    The visualized subdiaphragmatic structures appear intact. Small  hiatal hernia.    Impression  1. Coronary artery calcium score of 1*.  2. Noncalcified pulmonary nodules in the left lower and right upper  lobes measuring less than 6 mm. No further follow-up is required,  however, if the patient has high risk factors for primary lung  malignancy, follow-up noncontrast CT scan chest in 12 months may be  obtained. (Mj Stahl et al., Guidelines for management of  incidental pulmonary nodules detected on CT images: From the  Fleischner Society 2017, Radiology. 2017 Jul;284 (1):228-243.)    *Coronary Artery  Agatston score    Lilia robles al. JCCT 2016 (http://dx.doi.org/10.1016/j.jcct.2016.11.003)    GALAVIZ 10-Year CHD Risk with Coronary Artery Calcification can be  calculated  using link below  [email or web address]  Harriet. JACC 2015 (http://dx.doi.org/10.1016/j.j  acc.2015.08.035)    Signed by: Deep Carrasquillopalan 2/26/2025 11:30 AM  Dictation workstation:   [facility]   , CT Abdomin     No results found for this or any previous visit from the past 730 days.    SOCIAL:  Tobacco Use History[4]   Social History     Substance and Sexual Activity   Alcohol Use Never    Comment: has not drank in 15 years due to medication      Social History     Substance and Sexual Activity   Drug Use Never        NPO STATUS:  No data recorded    Clinical Areas Reviewed:                   Anesthesia Assessment:    Physical Exam    Airway  Mallampati: II  Neck ROM: full     Cardiovascular - normal exam  Rhythm: regular  Rate: normal     Dental - normal exam     Pulmonary - normal examBreath sounds clear to auscultation     Abdominal            Anesthesia Plan    History of general anesthesia?: yes  History of complications of general anesthesia?: no    ASA 3     The patient is a current smoker.  Patient did not smoke on day of procedure.    Anesthetic plan and risks discussed with patient.  Use of blood products discussed with patient who.    Plan discussed with [facility].               [1]   Allergies  Allergen Reactions    Bactrim [Sulfamethoxazole-Trimethoprim] GI Upset    Cephalexin GI Upset     GASTROINTESTIAL IRRITATION    Ciprofloxacin GI Upset     Reaction from Community: Other(See Desc)    Darvocet A500 [Propoxyphene N-Acetaminophen] Itching    Nsaids (Non-Steroidal Anti-Inflammatory Drug) Other     Hx of ulcerative colitis    Vicodin [Hydrocodone-Acetaminophen] Itching   [2]   Past Medical History:  Diagnosis Date    Depression, unspecified     Depressive disorder    Medial meniscus tear 04/24/2023    Ocular rosacea     Personal history of other diseases of the digestive system 01/12/2016    History of esophageal reflux    Personal history of other  diseases of the respiratory system 08/13/2018    History of acute sinusitis    Personal history of other endocrine, nutritional and metabolic disease     History of hypothyroidism    Personal history of other specified conditions     History of insomnia    Prediabetes     Prediabetes    Sacro-iliac pain     Tear of meniscus of right knee 04/24/2023    Ulcerative colitis    [3]   Past Surgical History:  Procedure Laterality Date    APPENDECTOMY  01/12/2016    Appendectomy    CHOLECYSTECTOMY  01/12/2016    Cholecystectomy    HERNIA REPAIR  01/12/2016    Inguinal Hernia Repair    HYSTERECTOMY  11/07/2017    Hysterectomy    MR HEAD ANGIO WO IV CONTRAST  02/06/2019    MR HEAD ANGIO WO IV CONTRAST 2/6/2019 [facility]    MR NECK ANGIO WO IV CONTRAST  02/06/2019    MR NECK ANGIO WO IV CONTRAST 2/6/2019 [facility]    OTHER SURGICAL HISTORY  08/12/2020    Knee arthroscopy    PANCREAS SURGERY      SINUS SURGERY  01/12/2016    Sinus Surgery    SPLENECTOMY, TOTAL  01/12/2016    Splenectomy    TOTAL ABDOMINAL HYSTERECTOMY W/ BILATERAL SALPINGOOPHORECTOMY  01/12/2016    Total Abdominal Hysterectomy With Removal Of Both Ovaries   [4]   Social History  Tobacco Use   Smoking Status Every Day    Types: Cigarettes   Smokeless Tobacco Never

## 2025-05-15 NOTE — ANESTHESIA PROCEDURE NOTES
Airway  Date/Time: 5/15/2025 12:33 PM  Reason: elective    Airway not difficult    Staffing  Performed: JOSE   Authorized by: Long Smiley MD    Performed by: JOSE Tran  Patient location during procedure: OR    Patient Condition  Indications for airway management: anesthesia  Patient position: sniffing  No planned trial extubation  Sedation level: deep     Final Airway Details   Preoxygenated: yes  Final airway type: endotracheal airway  Successful airway: ETT  Cuffed: yes   Successful intubation technique: direct laryngoscopy  Adjuncts used in placement: intubating stylet  Endotracheal tube insertion site: oral  Blade: Iam  Blade size: #3  ETT size (mm): 7.0  Cormack-Lehane Classification: grade I - full view of glottis  Placement verified by: chest auscultation and capnometry   Cuff volume (mL): 7  Measured from: lips  ETT to lips (cm): 22  Number of attempts at approach: 1

## 2025-05-15 NOTE — PERIOPERATIVE NURSING NOTE
"3299 Pt medicated with 5mg Oxycodone. Pt states \"I might need 10mg\". This rn asked if pt takes Oxycodone for chronic pain. Pt states \"no. But in the past I've need 10mg of something for surgery pain\". Pt is unaware what medication she needs 10mg of but states \"I think it's that narcotic\".  "

## 2025-05-16 ENCOUNTER — TELEPHONE (OUTPATIENT)
Dept: UROLOGY | Facility: HOSPITAL | Age: 60
End: 2025-05-16
Payer: COMMERCIAL

## 2025-05-16 DIAGNOSIS — B37.9 CANDIDA INFECTION: ICD-10-CM

## 2025-05-16 DIAGNOSIS — N20.0 RIGHT RENAL STONE: Primary | ICD-10-CM

## 2025-05-16 LAB — BACTERIA UR CULT: ABNORMAL

## 2025-05-16 RX ORDER — FLUCONAZOLE 200 MG/1
200 TABLET ORAL DAILY
Qty: 3 TABLET | Refills: 0 | Status: SHIPPED | OUTPATIENT
Start: 2025-05-16 | End: 2025-05-23 | Stop reason: HOSPADM

## 2025-05-16 NOTE — TELEPHONE ENCOUNTER
----- Message from Mick Brown sent at 5/16/2025 12:17 PM EDT -----  Fluconazole 200mg for 3 days please   ----- Message -----  From: Summer Valdez MA  Sent: 5/13/2025   1:42 PM EDT  To: Mick Brown MD MPH

## 2025-05-21 LAB
APPEARANCE STONE: NORMAL
COMPN STONE: NORMAL
SPECIMEN WT: 13 MG

## 2025-05-23 ENCOUNTER — HOSPITAL ENCOUNTER (OUTPATIENT)
Dept: GASTROENTEROLOGY | Facility: HOSPITAL | Age: 60
Discharge: HOME | End: 2025-05-23
Payer: COMMERCIAL

## 2025-05-23 ENCOUNTER — PHARMACY VISIT (OUTPATIENT)
Dept: PHARMACY | Facility: CLINIC | Age: 60
End: 2025-05-23
Payer: COMMERCIAL

## 2025-05-23 VITALS
DIASTOLIC BLOOD PRESSURE: 72 MMHG | WEIGHT: 210 LBS | SYSTOLIC BLOOD PRESSURE: 124 MMHG | BODY MASS INDEX: 31.93 KG/M2 | OXYGEN SATURATION: 100 % | TEMPERATURE: 97.9 F | HEART RATE: 65 BPM | RESPIRATION RATE: 16 BRPM

## 2025-05-23 DIAGNOSIS — M54.12 CERVICAL RADICULITIS: ICD-10-CM

## 2025-05-23 PROCEDURE — 2500000004 HC RX 250 GENERAL PHARMACY W/ HCPCS (ALT 636 FOR OP/ED): Performed by: PHYSICAL MEDICINE & REHABILITATION

## 2025-05-23 PROCEDURE — 2500000005 HC RX 250 GENERAL PHARMACY W/O HCPCS: Mod: JZ | Performed by: PHYSICAL MEDICINE & REHABILITATION

## 2025-05-23 PROCEDURE — 2500000002 HC RX 250 W HCPCS SELF ADMINISTERED DRUGS (ALT 637 FOR MEDICARE OP, ALT 636 FOR OP/ED): Performed by: CLINICAL NURSE SPECIALIST

## 2025-05-23 PROCEDURE — 2550000001 HC RX 255 CONTRASTS: Performed by: PHYSICAL MEDICINE & REHABILITATION

## 2025-05-23 PROCEDURE — 62321 NJX INTERLAMINAR CRV/THRC: CPT | Performed by: PHYSICAL MEDICINE & REHABILITATION

## 2025-05-23 RX ORDER — DIAZEPAM 5 MG/1
5 TABLET ORAL ONCE AS NEEDED
Status: COMPLETED | OUTPATIENT
Start: 2025-05-23 | End: 2025-05-23

## 2025-05-23 RX ORDER — LIDOCAINE HYDROCHLORIDE 5 MG/ML
INJECTION, SOLUTION INFILTRATION; INTRAVENOUS AS NEEDED
Status: COMPLETED | OUTPATIENT
Start: 2025-05-23 | End: 2025-05-23

## 2025-05-23 RX ORDER — SODIUM CHLORIDE 9 MG/ML
INJECTION, SOLUTION INTRAMUSCULAR; INTRAVENOUS; SUBCUTANEOUS AS NEEDED
Status: COMPLETED | OUTPATIENT
Start: 2025-05-23 | End: 2025-05-23

## 2025-05-23 RX ORDER — PHENAZOPYRIDINE HYDROCHLORIDE 200 MG/1
200 TABLET, FILM COATED ORAL 3 TIMES DAILY
Qty: 15 TABLET | Refills: 0 | Status: SHIPPED | OUTPATIENT
Start: 2025-05-23 | End: 2025-05-28

## 2025-05-23 RX ORDER — OXYBUTYNIN CHLORIDE 5 MG/1
10 TABLET, EXTENDED RELEASE ORAL DAILY PRN
Qty: 15 TABLET | Refills: 0 | Status: SHIPPED | OUTPATIENT
Start: 2025-05-23 | End: 2025-06-07

## 2025-05-23 RX ADMIN — IOHEXOL 5 ML: 350 INJECTION, SOLUTION INTRAVENOUS at 10:07

## 2025-05-23 RX ADMIN — DIAZEPAM 5 MG: 5 TABLET ORAL at 09:50

## 2025-05-23 RX ADMIN — DEXAMETHASONE SODIUM PHOSPHATE 10 MG: 4 INJECTION INTRA-ARTICULAR; INTRALESIONAL; INTRAMUSCULAR; INTRAVENOUS; SOFT TISSUE at 10:09

## 2025-05-23 RX ADMIN — SODIUM CHLORIDE 5 ML: 9 INJECTION, SOLUTION INTRAMUSCULAR; INTRAVENOUS; SUBCUTANEOUS at 10:07

## 2025-05-23 RX ADMIN — LIDOCAINE HYDROCHLORIDE 15 ML: 5 INJECTION, SOLUTION INFILTRATION at 10:07

## 2025-05-23 ASSESSMENT — PAIN SCALES - GENERAL
PAINLEVEL_OUTOF10: 3
PAINLEVEL_OUTOF10: 5 - MODERATE PAIN

## 2025-05-23 ASSESSMENT — COLUMBIA-SUICIDE SEVERITY RATING SCALE - C-SSRS
1. IN THE PAST MONTH, HAVE YOU WISHED YOU WERE DEAD OR WISHED YOU COULD GO TO SLEEP AND NOT WAKE UP?: NO
6. HAVE YOU EVER DONE ANYTHING, STARTED TO DO ANYTHING, OR PREPARED TO DO ANYTHING TO END YOUR LIFE?: NO
2. HAVE YOU ACTUALLY HAD ANY THOUGHTS OF KILLING YOURSELF?: NO

## 2025-05-23 NOTE — DISCHARGE INSTRUCTIONS
DISCHARGE INSTRUCTIONS FOR INJECTIONS     After most injections, it is recommended that you relax and limit your activity for the remainder of the day unless you have been told otherwise by your pain physician.  You should not drive a car, operate machinery, or make important legal decisions unless otherwise directed by your pain physician.  You may resume your normal activity, including exercise, tomorrow.      Keep a written pain diary of how much pain relief you experienced following the injection procedure and the length of time of pain relief you experienced pain relief. Following diagnostic injections like medial branch nerve blocks, genicular nerve blocks, sacroiliac joint blocks, stellate ganglion injections and other blocks, it is very important you record the specific amount of pain relief you experienced immediately after the injection and how long it lasted. If you have been given Questionnaire paperwork to fill out out after your diagnostic block please call [phone] and leave a detailed voicemail with the answers to the questions you were given. This information is vital to getting approval for next steps.    Observe the needle site for excessive bleeding (slow general oozing that completely soaks the dressing or fresh bright red bleeding).  In either case, apply pressure to the area, elevate it if possible and call your doctor at once.      For all injections, please keep the injection site dry and inspect the site for a couple of days. You may remove the Band-Aid the day of the injection at any time.     Keep the needle site clean & dry for 24 hours.   no soaking baths, hot tubs, whirlpools or swimming pools for 2-3 days.     Some discomfort, bruising or slight swelling may occur at the injection site. This is not abnormal if it occurs.  If needed you may:    -Take over the counter medication such as Tylenol or Motrin.   -Apply an ice pack for 30 minutes, 2 to 3 times a day for the first 24  Valtrex ordered  Speculum exam to confirm no active lesions hours.     If you are given steroids in your injection, your pain may not be gone immediately after this procedure. It generally takes 3-5 days for the steroid to work but it can take up to 2 weeks. may You may notice a worsening of your symptoms for 1-2 days after the injection. This is not abnormal.  You may use acetaminophen, ibuprofen, or prescription medication that your doctor may have prescribed for you if you need to do so.     A few common side effects of steroids include facial flushing, sweating, restlessness, irritability,difficulty sleeping, increase in blood sugar, and increased blood pressure. If you have diabetes, please monitor your blood sugar at least once a day for at least 5 days. If you have poorly controlled high blood pressure, monitoryour blood pressure for at least 2 days and contact your primary care physician if these numbers are unusually high for you.        Call  Pain Management at [phone] between 8am-4pm Monday - Friday if you are experiencing the following:    If you received an epidural or spinal injection:    -Headache that does not go away with medicine, is worse when sitting or standing up, and is greatly relieved upon lying down.   -Severe pain worse than or different than your baseline pain.   -Chills or fever (101º F or greater).   -Drainage or signs of infection at the injection site     Go directly to the Emergency Department if you are experiencing the following and received an epidural or spinal injection:   -Abrupt weakness or progressive weakness in your legs that starts after you leave the clinic.   -Abrupt severe or worsening numbness in your legs.   -Inability to urinate after the injection or loss of bowel or bladder control without the urge to defecate or urinate.     If you have a clinical question that cannot wait until your next appointment, please call [phone] between 8am-4pm Monday - Friday. We do our best to return all non-emergency messages within  24-48 hours, Monday - Friday. A nurse or physician will return your message. You may also try calling and they will do their best to answer your question(s):    [facility]en[facility] Matthew[facility] nurse [phone]  [facility] Pain Management nurse [phone]  After hours pain management [phone]    If you need to cancel an appointment, please call the scheduling staff at [phone] during normal business hours or leave a message at least 24 hours in advance.

## 2025-05-23 NOTE — H&P
HISTORY AND PHYSICAL UPDATE FOR PROCEDURE    History Of Present Illness  Paola Manley is a 60 y.o. female presenting with neck and arm pain .  Here for Cervical interlaminar epidural steroid injection, likely C7/T1    This note is intended to be an update to the H&P from the last office consult note. Please refer to the last pain management consult note for full H&P.    she denies any recent antibiotic use or infections, she denies any blood thinner use , and she denies contrast or local anesthetic allergies     Pre-sedation evaluation:  ASA Classification (bolded):   ASA I: Healthy patient, non-smoking, no none or minimal alcohol use  ASA II: Patient with mild systemic disease, without substantiative functional limitations.  Current smoker, social alcohol drinker, pregnancy, obesity (BMI 30-40)DM/HTN,, well-controlled mild lung disease  ASA III: Patient with severe systemic disease; substantiative of functional limitation; One or more moderate to severe diseases: Poorly controlled DM/HTN, COPD, morbid obesity (BMI>40), active hepatitis, alcohol abuse/dependence, implanted pacemaker, moderate reduction of ejection fraction, ESRD on dialysis, history (>3months) of MI, CVA, TIA or CAD/stents  ASA IV: Patient with severe systemic disease that is a constant threat to life; recent (<3 months) MI, CVA, TIA or CAD/stents, ongoing cardiac ischemia or severe valvular dysfunction, severely reduced ejection fraction, shock, sepsis, DIC, ESRD not undergoing regular scheduled dialysis    Mallampati score (bolded):   Class I: Complete visualization of the soft palate  Class II: Complete visualization of the uvula  Class III: Visualization of only the base of the uvula  Class IV: Soft palate is not visible at all    Past Medical History  Medical History[1]    Surgical History  Surgical History[2]     Social History  She reports that she has been smoking cigarettes. She has a 4 pack-year smoking history. She has never used  smokeless tobacco. She reports that she does not drink alcohol and does not use drugs.    Family History  Family History[3]     Allergies  Bactrim [sulfamethoxazole-trimethoprim], Cephalexin, Ciprofloxacin, Darvocet a500 [propoxyphene n-acetaminophen], Nsaids (non-steroidal anti-inflammatory drug), and Vicodin [hydrocodone-acetaminophen]    Review of Systems   12 point ROS done and negative except for the above.   Physical Exam     General: NAD, well groomed, well nourished  Eyes: Non-icteric sclera, EOMI  Ears, Nose, Mouth, and Throat: External ears and nose appear to be without deformity or rash. No lesions or masses noted. Hearing is grossly intact.   Neck: Trachea midline  Respiratory: Nonlabored breathing   Cardiovascular: No peripheral edema   Skin: No rashes or open lesions/ulcers identified on skin.    Last Recorded Vitals  not currently breastfeeding.    Relevant Results  Current Outpatient Medications   Medication Instructions    azelastine (Astelin) 137 mcg (0.1 %) nasal spray 1 spray, Each Nostril, 2 times daily    buPROPion XL (WELLBUTRIN XL) 150 mg, Daily before breakfast    cloNIDine (Catapres) 0.2 mg tablet TAKE ONE (1) TABLET AT BEDTIME    cyanocobalamin (VITAMIN B-12) 1,000 mcg, intramuscular, Every 30 days    diazePAM (VALIUM) 2 mg, oral, Once    doxycycline (ADOXA) 150 mg, Daily    DULoxetine (CYMBALTA) 30 mg, oral, Daily, To be taken in combination with duloxetine 60 mg daily for a total of 90 mg daily    DULoxetine (CYMBALTA) 60 mg, oral, Daily, To be taken in combination with duloxetine 30 mg daily for total dose of 90 mg daily    fluticasone (Flonase) 50 mcg/actuation nasal spray 1 spray, Each Nostril, 2 times daily    hydrOXYzine pamoate (VISTARIL) 25 mg, 3 times daily PRN    levothyroxine (SYNTHROID, LEVOXYL) 88 mcg, oral, Daily, Please take by mouth every other day in an alternating fashion opposite of the 100 mcg    Linzess 290 mcg, oral, Daily before breakfast, DO NOT CRUSH OR CHEW     "melatonin 10 mg capsule TAKE 1 CAPSULE Bedtime PRN    metaxalone (Skelaxin) 800 mg tablet TAKE ONE-HALF (1/2) TABLET TWO (2) TIMES A DAY AS NEEDED FOR MUSCLE SPASM    Mounjaro 15 mg, subcutaneous, Once Weekly    oxyBUTYnin XL (DITROPAN-XL) 10 mg, oral, Daily PRN, Do not crush, chew, or split.    pantoprazole (PROTONIX) 40 mg, oral, Daily    pregabalin (LYRICA) 200 mg, oral, 3 times daily    rosuvastatin (CRESTOR) 10 mg, oral, Daily    sodium hyaluronate (Durolane) 60 mg/3 mL injection INJECTION 3ML, ONE DOSE INTO BOTH KNEES ONE TIME    triamcinolone (Kenalog) 0.1 % cream APPLY TO LEGS TWO (2) TIMES A DAY AS NEEDED    Vyvanse 20 mg, Daily      Lab Results   Component Value Date    WBC 12.1 (H) 05/08/2025    HGB 13.7 05/08/2025    HCT 39.7 05/08/2025    MCV 96 05/08/2025     05/08/2025      Lab Results   Component Value Date    INR 1.0 09/13/2022    INR 1.0 01/28/2021    INR 0.9 12/23/2019    PROTIME 11.4 09/13/2022    PROTIME 12.0 01/28/2021    PROTIME 10.4 12/23/2019     No results found for: \"PTT\"  Lab Results   Component Value Date    GLUCOSE 96 05/08/2025    CALCIUM 9.7 05/08/2025     05/08/2025    K 3.5 05/08/2025    CO2 25 05/08/2025     05/08/2025    BUN 19 05/08/2025    CREATININE 0.95 05/08/2025       === 03/12/25 ===    MR CERVICAL SPINE WO CONTRAST    - Impression -  Degenerative changes of the cervical spine with mild-to-moderate  spinal canal stenosis. Varying degrees of neural foraminal narrowing  most pronounced at C4-C5 with moderate right and mild-to-moderate  left neural foraminal stenosis.    MACRO:  None    Signed by: Elisha Mcclain 3/13/2025 2:39 PM  Dictation workstation:   [id number]       Assessment/Plan   Paola Manley is a 60 y.o. F who presents for Cervical interlaminar epidural steroid injection.     Risks, benefits, alternatives discussed. All questions answered to the best of my ability. Patient agrees to proceed. Consent signed and patient marked appropriately.    -We " will proceed with planned procedure  -Discussed with the patient that once appropriate from a recovery standpoint, they should continue physical therapy exercises at least 2-3 times per week for best long term outcomes  - discussed with the patient that if they take blood thinners, they may resume them in 24hrs        Ravinder Rosenberg MD  Interventional Pain Fellow, PGY-5  [facility]         [1]   Past Medical History:  Diagnosis Date    Depression, unspecified     Depressive disorder    Medial meniscus tear 04/24/2023    Ocular rosacea     Personal history of other diseases of the digestive system 01/12/2016    History of esophageal reflux    Personal history of other diseases of the respiratory system 08/13/2018    History of acute sinusitis    Personal history of other endocrine, nutritional and metabolic disease     History of hypothyroidism    Personal history of other specified conditions     History of insomnia    Prediabetes     Prediabetes    Sacro-iliac pain     Tear of meniscus of right knee 04/24/2023    Ulcerative colitis    [2]   Past Surgical History:  Procedure Laterality Date    APPENDECTOMY  01/12/2016    Appendectomy    CHOLECYSTECTOMY  01/12/2016    Cholecystectomy    HERNIA REPAIR  01/12/2016    Inguinal Hernia Repair    HYSTERECTOMY  11/07/2017    Hysterectomy    MR HEAD ANGIO WO IV CONTRAST  02/06/2019    MR HEAD ANGIO WO IV CONTRAST 2/6/2019 [facility]    MR NECK ANGIO WO IV CONTRAST  02/06/2019    MR NECK ANGIO WO IV CONTRAST 2/6/2019 [facility]    OTHER SURGICAL HISTORY  08/12/2020    Knee arthroscopy    PANCREAS SURGERY      SINUS SURGERY  01/12/2016    Sinus Surgery    SPLENECTOMY, TOTAL  01/12/2016    Splenectomy    TOTAL ABDOMINAL HYSTERECTOMY W/ BILATERAL SALPINGOOPHORECTOMY  01/12/2016    Total Abdominal Hysterectomy With Removal Of Both Ovaries   [3]   Family History  Problem Relation Name Age of Onset    Arthritis Mother      Heart disease  Mother      Other (CVA) Mother      Coronary artery disease Mother      Diabetes Mother      Osteoporosis Mother      Heart failure Mother      Kidney failure Mother      Alcohol abuse Father      Hypertension Father      Lung cancer Father      Diabetes Maternal Grandmother      Liver cancer Maternal Grandmother      Throat cancer Maternal Grandfather      Osteoarthritis Other          Grandmother

## 2025-06-01 NOTE — PROGRESS NOTES
Subjective   Patient ID: Paola Manley is a 60 y.o. female who presents for Annual Exam.  Today she is accompanied by alone.     HPI  1.  Healthcare maintenance  Overall patient is doing well.  Currently she is recovering from a nephrolithiasis and has a right-sided ureteral stent in place, with plan to remove it in 3 days  Immunization: Tdap 2023  Influenza vaccine yearly  Shingrix up-to-date x 2  COVID-19 vaccine up-to-date  Colon Cancer Screening: Colonoscopy performed October 2023, 5-year clearance, due 2028  OB/GYN: Mammogram performed January 2024, Paps no longer needed secondary to history of hysterectomy  Diet: Attempting to eat a balanced diet  Exercise: Attempting to exercise more regularly  Tobacco: Smokes 7-8 cigarettes/day  EtOH: Rarely/socially     1. Type 2 Diabetes  She was recently started on Mounjaro in November 2022 due to weight loss clinic.  Her last hemoglobin A1c was performed in 01/2025 and showed a value of 5.3%  Not currently on an ACE inhibitor or ARB.  She is tolerating her medication.  Currently down 50+ pounds  Denies polydipsia or dry mouth.  Requesting refills of the Mounjaro and feels well     2. Allergic rhinitis  She continues to use Flonase and Astelin as needed.  Requesting refills of both her Astelin and fluticasone  Denies sinus pressure, fever, chills, sore throat, chest pain, or shortness of breath.     3.  Constipation/ulcerative colitis  She is taking Linzess 290 mcg as needed.  Also noted that she is following up with Dr. Holman   She states that this helps when she notices that she is getting more constipated.  Requesting refills.  Did perform a colonoscopy in fall 2023 with a 5-year clearance     4. Hyperlipidemia  Currently taking rosuvastatin 10 mg daily.  She is tolerating the medication, denies myalgias.  Last cholesterol was great and stable with an LDL was at 82  Requesting refills     5. Hypothyroidism  Currently on Levothyroxine 100 mcg, but most recent TSH value  is low at 0.01, with free T4 of 1.13  States she went back to the Levothyroxine 88 mcg after her most recent TSA labs.     6.  GERD  Currently takes pantoprazole 40 mg daily.  States that this medication continues to help with her symptoms, and knows when she forgets to take her medication that her symptoms returns.  Last endoscopy was in 2016  Again, currently following up with gastroenterology     7. Vitamin B12 deficiency  In the past she has been receiving vitamin B12 injections.  However, since the beginning of COVID-19 pandemic, she did not want to come to the office monthly and had stopped getting her vitamin B12 shots.  B12 once again is low at 128  She continues to do B12 injections at home  Requesting refills     8.  Nicotine dependence  Continues to smoke a few cigarettes daily  Still has nicotine patches at home     9.  Left knee pain  Continues to follow-up with the orthopedic provider for injections and even possibly considering a replacement     10. Cervalgia with radiculopathy and sacroiliitis/ Fibromyalgia  Follows regularly with Pain management and PT  Has done several nerve blocks and sacroiliac steroid injections  Currently on Cymbalta 90 mg daily and Lyrica 200 mg TID, managed by pain medicine    11.  Depression and insomnia/adult ADHD  Follows with psychiatry and takes Wellbutrin 150 mg daily and Catapres 0.2 mg at bedtime.  Also on Cymbalta for her fibromyalgia  Denies any medication side effects  Currently on Vyvanse for her adult ADHD  Admitted her symptoms are well-controlled on these medications    Current Outpatient Medications on File Prior to Visit   Medication Sig Dispense Refill    buPROPion XL (Wellbutrin XL) 150 mg 24 hr tablet Take 1 tablet (150 mg) by mouth once daily in the morning. Take before meals.      cloNIDine (Catapres) 0.2 mg tablet TAKE ONE (1) TABLET AT BEDTIME      doxycycline (Adoxa) 150 mg tablet Take 1 tablet (150 mg) by mouth once daily.      DULoxetine (Cymbalta)  30 mg DR capsule Take 1 capsule (30 mg) by mouth once daily. To be taken in combination with duloxetine 60 mg daily for a total of 90 mg daily 30 capsule 5    DULoxetine (Cymbalta) 60 mg DR capsule Take 1 capsule (60 mg) by mouth once daily. To be taken in combination with duloxetine 30 mg daily for total dose of 90 mg daily 30 capsule 5    hydrOXYzine pamoate (Vistaril) 25 mg capsule Take 1 capsule (25 mg) by mouth 3 times a day as needed.      melatonin 10 mg capsule TAKE 1 CAPSULE Bedtime PRN      metaxalone (Skelaxin) 800 mg tablet TAKE ONE-HALF (1/2) TABLET TWO (2) TIMES A DAY AS NEEDED FOR MUSCLE SPASM 30 tablet 2    oxyBUTYnin XL (Ditropan-XL) 5 mg 24 hr tablet Take 2 tablets (10 mg) by mouth once daily as needed (bladder spasm) for up to 15 days. Do not crush, chew, or split. 15 tablet 0    [] phenazopyridine (Pyridium) 200 mg tablet Take 1 tablet (200 mg) by mouth 3 times a day for 5 days. 15 tablet 0    pregabalin (Lyrica) 200 mg capsule Take 1 capsule (200 mg) by mouth 3 times a day. 90 capsule 5    sodium hyaluronate (Durolane) 60 mg/3 mL injection INJECTION 3ML, ONE DOSE INTO BOTH KNEES ONE TIME 6 mL 0    triamcinolone (Kenalog) 0.1 % cream APPLY TO LEGS TWO (2) TIMES A DAY AS NEEDED      Vyvanse 20 mg capsule Take 1 capsule (20 mg) by mouth once daily. Take in the morning      [DISCONTINUED] azelastine (Astelin) 137 mcg (0.1 %) nasal spray Administer 1 spray into each nostril 2 times a day. 30 mL 3    [DISCONTINUED] cyanocobalamin (Vitamin B-12) 1,000 mcg/mL injection Inject 1 mL (1,000 mcg) into the muscle every 30 (thirty) days. 1 mL 11    [DISCONTINUED] diazePAM (Valium) 2 mg tablet Take 1 tablet (2 mg) by mouth 1 time for 1 dose. 2 tablet 0    [DISCONTINUED] fluticasone (Flonase) 50 mcg/actuation nasal spray Administer 1 spray into each nostril 2 times a day. 18.2 g 3    [DISCONTINUED] levothyroxine (Synthroid, Levoxyl) 88 mcg tablet Take 1 tablet (88 mcg) by mouth early in the morning..  Please take by mouth every other day in an alternating fashion opposite of the 100 mcg 90 tablet 0    [DISCONTINUED] linaCLOtide (Linzess) 290 mcg capsule Take 1 capsule (290 mcg) by mouth once daily in the morning. Take before meals. DO NOT CRUSH OR CHEW 30 capsule 4    [DISCONTINUED] pantoprazole (ProtoNix) 40 mg EC tablet Take 1 tablet (40 mg) by mouth once daily. 90 tablet 1    [DISCONTINUED] rosuvastatin (Crestor) 10 mg tablet Take 1 tablet (10 mg) by mouth once daily. 90 tablet 1    [DISCONTINUED] tirzepatide (Mounjaro) 15 mg/0.5 mL pen injector Inject 15 mg under the skin 1 (one) time per week. 6 mL 1     No current facility-administered medications on file prior to visit.        Allergies   Allergen Reactions    Bactrim [Sulfamethoxazole-Trimethoprim] GI Upset    Cephalexin GI Upset     GASTROINTESTIAL IRRITATION    Ciprofloxacin GI Upset     Reaction from Community: Other(See Desc)    Darvocet A500 [Propoxyphene N-Acetaminophen] Itching    Nsaids (Non-Steroidal Anti-Inflammatory Drug) Other     Hx of ulcerative colitis    Vicodin [Hydrocodone-Acetaminophen] Itching       Immunization History   Administered Date(s) Administered    Flu vaccine (IIV4), preservative free *Check age/dose* 10/11/2017, 12/20/2018, 09/28/2020, 11/24/2021, 12/29/2023    Flu vaccine, trivalent, preservative free, age 6 months and greater (Fluarix/Fluzone/Flulaval) 11/12/2013, 01/23/2025    Hib (PRP-D) 12/21/2012    Influenza, Unspecified 11/24/2021    Influenza, seasonal, injectable 01/07/2015, 10/06/2015    Meningococcal MCV4, Unspecified 12/21/2012    Meningococcal, Unknown Serogroups 12/21/2012    Moderna COVID-19 vaccine, 12 years and older (50mcg/0.5mL)(Spikevax) 12/29/2023    Moderna SARS-CoV-2 Vaccination 04/08/2021, 05/06/2021, 01/17/2022    Pneumococcal Conjugate PCV 7 12/21/2012    Pneumococcal conjugate vaccine, 20-valent (PREVNAR 20) 12/15/2023    Tdap vaccine, age 7 year and older (BOOSTRIX, ADACEL) 11/11/2013,  "12/15/2023    Zoster vaccine, recombinant, adult (SHINGRIX) 01/17/2022, 01/24/2023         Review of Systems  All pertinent positive symptoms are included in the history of present illness.  All other systems have been reviewed and are negative and noncontributory to this patient's current ailments.     Objective   /72 (BP Location: Left arm, Patient Position: Sitting, BP Cuff Size: Adult)   Pulse 103   Ht 1.727 m (5' 8\")   Wt 98.4 kg (217 lb)   SpO2 98%   BMI 32.99 kg/m²   BSA: 2.17 meters squared  Admission on 05/15/2025, Discharged on 05/15/2025   Component Date Value Ref Range Status    POCT Glucose 05/15/2025 105 (H)  74 - 99 mg/dL Final    Calculi Mass 05/15/2025 13  mg Final    Calculi Description 05/15/2025 See Note   Final    Specimen consists of two brown calculi fragments.  The total weight is 13 mg.    Calculi Composition 05/15/2025 See Note   Final    Calculi composed primarily of  calcium oxalate monohydrate.  INTERPRETIVE INFORMATION: Calculi (Stone) analysis    Calculi are the products of physiological processes that yield   crystalline compounds in a matrix of biological compounds and   blood.  Matrix components are not reported.  The clinically   significant crystalline components identified in calculi specimens   are reported.  Gross description may not be consistent with   composition determined by FTIR analysis.  Performed By: [facility]  [address] [place] [address]  : Curtis Watkins MD, PhD  CLIA Number: [id number]    POCT Glucose 05/15/2025 76  74 - 99 mg/dL Final   Office Visit on 05/13/2025   Component Date Value Ref Range Status    POC Color, Urine 05/13/2025 Yellow  Straw, Yellow, Light-Yellow Final    POC Appearance, Urine 05/13/2025 Clear  Clear Final    POC Glucose, Urine 05/13/2025 NEGATIVE  NEGATIVE mg/dl Final    POC Bilirubin, Urine 05/13/2025 NEGATIVE  NEGATIVE Final    POC Ketones, Urine 05/13/2025 NEGATIVE  NEGATIVE mg/dl " Final    POC Specific Gravity, Urine 05/13/2025 1.020  1.005 - 1.035 Final    POC Blood, Urine 05/13/2025 SMALL (1+) (A)  NEGATIVE Final    POC PH, Urine 05/13/2025 6.0  No Reference Range Established PH Final    POC Protein, Urine 05/13/2025 TRACE (A)  NEGATIVE mg/dl Final    POC Urobilinogen, Urine 05/13/2025 0.2  0.2, 1.0 EU/DL Final    Poc Nitrite, Urine 05/13/2025 NEGATIVE  NEGATIVE Final    POC Leukocytes, Urine 05/13/2025 TRACE (A)  NEGATIVE Final    CULTURE, URINE, ROUTINE 05/13/2025 SEE NOTE (A)   Final    Comment:     CULTURE, URINE, ROUTINE         Micro Number:      [id number]    Test Status:       Final    Specimen Source:   Urine    Specimen Quality:  Adequate    Result:            10,000-49,000 CFU/mL of Candida glabrata    Comment:           Susceptibility testing not routinely                       performed on this isolate.      COMMENT:           Additional non-predominating organism(s) isolated.                       These organisms, commonly found on external and                       internal genitalia, are considered colonizers. No                       further testing performed.     Admission on 05/08/2025, Discharged on 05/08/2025   Component Date Value Ref Range Status    WBC 05/08/2025 12.1 (H)  4.4 - 11.3 x10*3/uL Final    nRBC 05/08/2025 0.0  0.0 - 0.0 /100 WBCs Final    RBC 05/08/2025 4.12  4.00 - 5.20 x10*6/uL Final    Hemoglobin 05/08/2025 13.7  12.0 - 16.0 g/dL Final    Hematocrit 05/08/2025 39.7  36.0 - 46.0 % Final    MCV 05/08/2025 96  80 - 100 fL Final    MCH 05/08/2025 33.3  26.0 - 34.0 pg Final    MCHC 05/08/2025 34.5  32.0 - 36.0 g/dL Final    RDW 05/08/2025 13.9  11.5 - 14.5 % Final    Platelets 05/08/2025 249  150 - 450 x10*3/uL Final    Neutrophils % 05/08/2025 64.6  40.0 - 80.0 % Final    Immature Granulocytes %, Automated 05/08/2025 0.4  0.0 - 0.9 % Final    Immature Granulocyte Count (IG) includes promyelocytes, myelocytes and metamyelocytes but does not include bands.  Percent differential counts (%) should be interpreted in the context of the absolute cell counts (cells/UL).    Lymphocytes % 05/08/2025 22.4  13.0 - 44.0 % Final    Monocytes % 05/08/2025 10.1  2.0 - 10.0 % Final    Eosinophils % 05/08/2025 1.8  0.0 - 6.0 % Final    Basophils % 05/08/2025 0.7  0.0 - 2.0 % Final    Neutrophils Absolute 05/08/2025 7.81 (H)  1.20 - 7.70 x10*3/uL Final    Automated WBC differential has been confirmed by manual smear.  Percent differential counts (%) should be interpreted in the context of the absolute cell counts (cells/uL).    Immature Granulocytes Absolute, Au* 05/08/2025 0.05  0.00 - 0.70 x10*3/uL Final    Lymphocytes Absolute 05/08/2025 2.70  1.20 - 4.80 x10*3/uL Final    Monocytes Absolute 05/08/2025 1.22 (H)  0.10 - 1.00 x10*3/uL Final    Eosinophils Absolute 05/08/2025 0.22  0.00 - 0.70 x10*3/uL Final    Basophils Absolute 05/08/2025 0.08  0.00 - 0.10 x10*3/uL Final    Glucose 05/08/2025 96  74 - 99 mg/dL Final    Sodium 05/08/2025 141  136 - 145 mmol/L Final    Potassium 05/08/2025 3.5  3.5 - 5.3 mmol/L Final    Chloride 05/08/2025 104  98 - 107 mmol/L Final    Bicarbonate 05/08/2025 25  21 - 32 mmol/L Final    Anion Gap 05/08/2025 16  10 - 20 mmol/L Final    Urea Nitrogen 05/08/2025 19  6 - 23 mg/dL Final    Creatinine 05/08/2025 0.95  0.50 - 1.05 mg/dL Final    eGFR 05/08/2025 69  >60 mL/min/1.73m*2 Final    Calculations of estimated GFR are performed using the 2021 CKD-EPI Study Refit equation without the race variable for the IDMS-Traceable creatinine methods.  https://jasn.asnjournals.org/content/early/2021/09/22/ASN.5941174898    Calcium 05/08/2025 9.7  8.6 - 10.3 mg/dL Final    Albumin 05/08/2025 4.3  3.4 - 5.0 g/dL Final    Alkaline Phosphatase 05/08/2025 84  33 - 136 U/L Final    Total Protein 05/08/2025 7.2  6.4 - 8.2 g/dL Final    AST 05/08/2025 16  9 - 39 U/L Final    Bilirubin, Total 05/08/2025 0.5  0.0 - 1.2 mg/dL Final    ALT 05/08/2025 14  7 - 45 U/L Final     Patients treated with Sulfasalazine may generate falsely decreased results for ALT.    Color, Urine 05/08/2025 Light-Orange (N)  Light-Yellow, Yellow, Dark-Yellow Final    Appearance, Urine 05/08/2025 Ex.Turbid (N)  Clear Final    Specific Gravity, Urine 05/08/2025 1.019  1.005 - 1.035 Final    pH, Urine 05/08/2025 8.0  5.0, 5.5, 6.0, 6.5, 7.0, 7.5, 8.0 Final    Protein, Urine 05/08/2025 NEGATIVE  NEGATIVE, 10 (TRACE), 20 (TRACE) mg/dL Final    Glucose, Urine 05/08/2025 Normal  Normal mg/dL Final    Blood, Urine 05/08/2025 0.2 (2+) (A)  NEGATIVE mg/dL Final    Ketones, Urine 05/08/2025 NEGATIVE  NEGATIVE mg/dL Final    Bilirubin, Urine 05/08/2025 NEGATIVE  NEGATIVE mg/dL Final    Urobilinogen, Urine 05/08/2025 Normal  Normal mg/dL Final    Nitrite, Urine 05/08/2025 NEGATIVE  NEGATIVE Final    Leukocyte Esterase, Urine 05/08/2025 25 Archana/uL (A)  NEGATIVE Final    Extra Tube 05/08/2025 293   Final    RBC Morphology 05/08/2025 See Below   Final    RBC Fragments 05/08/2025 Few   Final    Acanthocytes 05/08/2025 Few   Final    Lipase 05/08/2025 33  9 - 82 U/L Final    N-acetyl-p-benzoquinone imine (metabolite of Acetaminophen)will generate erroneously low results in samples for patients that have taken toxic doses of acetaminophen.    Troponin I, High Sensitivity 05/08/2025 4  0 - 13 ng/L Final    Ventricular Rate 05/08/2025 79  BPM Final    Atrial Rate 05/08/2025 79  BPM Final    AR Interval 05/08/2025 154  ms Final    QRS Duration 05/08/2025 96  ms Final    QT Interval 05/08/2025 410  ms Final    QTC Calculation(Bazett) 05/08/2025 470  ms Final    P Axis 05/08/2025 51  degrees Final    R Axis 05/08/2025 -8  degrees Final    T Axis 05/08/2025 31  degrees Final    QRS Count 05/08/2025 13  beats Final    Q Onset 05/08/2025 220  ms Final    P Onset 05/08/2025 143  ms Final    P Offset 05/08/2025 195  ms Final    T Offset 05/08/2025 425  ms Final    QTC Fredericia 05/08/2025 449  ms Final    Troponin I, High Sensitivity  05/08/2025 5  0 - 13 ng/L Final    WBC, Urine 05/08/2025 21-50 (A)  1-5, NONE /HPF Final    RBC, Urine 05/08/2025 >20 (A)  NONE, 1-2, 3-5 /HPF Final    Squamous Epithelial Cells, Urine 05/08/2025 1-9 (SPARSE)  Reference range not established. /HPF Final    Mucus, Urine 05/08/2025 FEW  Reference range not established. /LPF Final    Amorphous Crystals, Urine 05/08/2025 3+ (A)  NONE, 1+, 2+ /HPF Final    Urine Culture 05/08/2025 Clinically insignificant growth based on current clinical standards.   Final       Physical Exam  Constitutional:       General: She is not in acute distress.     Appearance: Normal appearance. She is obese. She is not ill-appearing.   HENT:      Head: Normocephalic and atraumatic.      Right Ear: Tympanic membrane normal. There is no impacted cerumen.      Left Ear: Tympanic membrane and external ear normal. There is no impacted cerumen.      Ears:      Comments: Minimal bleeding from the right external ear canal, likely from scratching with her fingernail     Nose: Nose normal. No congestion or rhinorrhea.      Mouth/Throat:      Mouth: Mucous membranes are moist.      Pharynx: Oropharynx is clear. No oropharyngeal exudate or posterior oropharyngeal erythema.   Eyes:      General: No scleral icterus.     Extraocular Movements: Extraocular movements intact.      Conjunctiva/sclera: Conjunctivae normal.      Pupils: Pupils are equal, round, and reactive to light.   Cardiovascular:      Rate and Rhythm: Normal rate and regular rhythm.      Pulses: Normal pulses.      Heart sounds: Normal heart sounds. No murmur heard.  Pulmonary:      Effort: Pulmonary effort is normal. No respiratory distress.      Breath sounds: Normal breath sounds. No wheezing, rhonchi or rales.   Abdominal:      General: Abdomen is flat. Bowel sounds are normal.      Palpations: Abdomen is soft.      Tenderness: There is no abdominal tenderness. There is no guarding or rebound.   Musculoskeletal:         General: No  deformity. Normal range of motion.      Right lower leg: No edema.      Left lower leg: No edema.   Skin:     General: Skin is warm and dry.      Capillary Refill: Capillary refill takes less than 2 seconds.      Findings: No lesion or rash.   Neurological:      General: No focal deficit present.      Mental Status: She is alert and oriented to person, place, and time. Mental status is at baseline.   Psychiatric:         Mood and Affect: Mood normal.         Behavior: Behavior normal.           Assessment/Plan   1.  Healthcare maintenance  Complete history and physical examination was performed  EKG reveals normal sinus rhythm without acute changes  Requisition for routine lab work and mammogram was ordered today  We will notify of test results once available and make treatment recommendations accordingly  Patient up-to-date with immunization  Educated about importance of conducting a healthy lifestyle, following well-balanced diet and exercising regularly    1. Type 2 Diabetes  Continue Mounjaro 50 mg weekly  A1c one of the best on file  Continue the great work     2. Allergic rhinitis  happy to hear that your allergies appear to be well controlled with Flonase and Astelin.  I will send in other prescriptions at this time     3.  Constipation  Refills provided for Linzess 290 mcg daily as needed.  Otherwise, please follow-up with your gastroenterologist for continued care/recommendations     4. Hyperlipidemia  Cholesterol looks good  Continue medications without any changes, rosuvastatin 10 mg  CT cardiac score ordered to be done at your earliest mutual convenience to further evaluate for plaque/calcification within the coronary arteries     5. Hypothyroidism  Due to your TSH being low which was surprising, we will decreased your levothyroxine from 100 mcg to 88 mcg  Will repeat your lab work in the next 4-6 weeks     6. Esophageal reflux  Stable, continue to take pantoprazole 40 mg daily.     7. Vitamin B12  deficiency  Continue B12 injections  This was sent to your local pharmacy     8.  Nicotine dependence  Please let me know if you would like more nicotine patches  CT low-dose was ordered to be done at your earliest major convenience     9.  Left knee pain  Please continue following up with the orthopedic provider for further options/recommendations     10. Cervalgia with radiculopathy and sacroiliitis/ Fibromyalgia   Continue using Cymbalta and Lyrica as prescribed by your pain management  Continue to follow with pain management and orthopedic surgery, per their recommendations    11.  Depression and insomnia/adult ADHD  Continue following with psychiatrist, per the recommendation  Continue Wellbutrin, Catapres and Vyvanse as prescribed    Thank you for letting us be a part of your care team.  Please call the office if you have further questions or concerns regarding your care    Otherwise, please follow-up in 6 months for continued care and refills     Dagoberto Alvarado MD  PGY2, FM Resident  06/02/25

## 2025-06-02 ENCOUNTER — APPOINTMENT (OUTPATIENT)
Dept: PRIMARY CARE | Facility: CLINIC | Age: 60
End: 2025-06-02
Payer: COMMERCIAL

## 2025-06-02 VITALS
HEIGHT: 68 IN | SYSTOLIC BLOOD PRESSURE: 108 MMHG | HEART RATE: 103 BPM | WEIGHT: 217 LBS | BODY MASS INDEX: 32.89 KG/M2 | OXYGEN SATURATION: 98 % | DIASTOLIC BLOOD PRESSURE: 72 MMHG

## 2025-06-02 DIAGNOSIS — Z12.31 ENCOUNTER FOR SCREENING MAMMOGRAM FOR MALIGNANT NEOPLASM OF BREAST: ICD-10-CM

## 2025-06-02 DIAGNOSIS — Z13.1 SCREENING FOR DIABETES MELLITUS: ICD-10-CM

## 2025-06-02 DIAGNOSIS — E53.8 B12 DEFICIENCY: ICD-10-CM

## 2025-06-02 DIAGNOSIS — E03.9 HYPOTHYROIDISM, UNSPECIFIED TYPE: ICD-10-CM

## 2025-06-02 DIAGNOSIS — Z13.0 SCREENING FOR BLOOD DISEASE: ICD-10-CM

## 2025-06-02 DIAGNOSIS — E78.2 MIXED HYPERLIPIDEMIA: ICD-10-CM

## 2025-06-02 DIAGNOSIS — E11.9 TYPE 2 DIABETES MELLITUS WITHOUT COMPLICATION, WITHOUT LONG-TERM CURRENT USE OF INSULIN: ICD-10-CM

## 2025-06-02 DIAGNOSIS — Z13.29 SCREENING FOR THYROID DISORDER: ICD-10-CM

## 2025-06-02 DIAGNOSIS — J30.9 ALLERGIC RHINITIS, UNSPECIFIED SEASONALITY, UNSPECIFIED TRIGGER: ICD-10-CM

## 2025-06-02 DIAGNOSIS — K59.04 CHRONIC IDIOPATHIC CONSTIPATION: ICD-10-CM

## 2025-06-02 DIAGNOSIS — Z00.00 HEALTHCARE MAINTENANCE: Primary | ICD-10-CM

## 2025-06-02 DIAGNOSIS — Z13.6 SCREENING FOR CARDIOVASCULAR CONDITION: ICD-10-CM

## 2025-06-02 DIAGNOSIS — K21.9 GASTROESOPHAGEAL REFLUX DISEASE WITHOUT ESOPHAGITIS: ICD-10-CM

## 2025-06-02 PROCEDURE — 99214 OFFICE O/P EST MOD 30 MIN: CPT | Performed by: STUDENT IN AN ORGANIZED HEALTH CARE EDUCATION/TRAINING PROGRAM

## 2025-06-02 PROCEDURE — 99396 PREV VISIT EST AGE 40-64: CPT | Performed by: STUDENT IN AN ORGANIZED HEALTH CARE EDUCATION/TRAINING PROGRAM

## 2025-06-02 PROCEDURE — RXMED WILLOW AMBULATORY MEDICATION CHARGE

## 2025-06-02 PROCEDURE — 3078F DIAST BP <80 MM HG: CPT | Performed by: STUDENT IN AN ORGANIZED HEALTH CARE EDUCATION/TRAINING PROGRAM

## 2025-06-02 PROCEDURE — 3048F LDL-C <100 MG/DL: CPT | Performed by: STUDENT IN AN ORGANIZED HEALTH CARE EDUCATION/TRAINING PROGRAM

## 2025-06-02 PROCEDURE — 3008F BODY MASS INDEX DOCD: CPT | Performed by: STUDENT IN AN ORGANIZED HEALTH CARE EDUCATION/TRAINING PROGRAM

## 2025-06-02 PROCEDURE — 3074F SYST BP LT 130 MM HG: CPT | Performed by: STUDENT IN AN ORGANIZED HEALTH CARE EDUCATION/TRAINING PROGRAM

## 2025-06-02 PROCEDURE — 93000 ELECTROCARDIOGRAM COMPLETE: CPT | Performed by: STUDENT IN AN ORGANIZED HEALTH CARE EDUCATION/TRAINING PROGRAM

## 2025-06-02 PROCEDURE — 3044F HG A1C LEVEL LT 7.0%: CPT | Performed by: STUDENT IN AN ORGANIZED HEALTH CARE EDUCATION/TRAINING PROGRAM

## 2025-06-02 RX ORDER — LEVOTHYROXINE SODIUM 88 UG/1
88 TABLET ORAL DAILY
Qty: 90 TABLET | Refills: 0 | Status: SHIPPED | OUTPATIENT
Start: 2025-06-02

## 2025-06-02 RX ORDER — TIRZEPATIDE 15 MG/.5ML
15 INJECTION, SOLUTION SUBCUTANEOUS
Qty: 6 ML | Refills: 1 | Status: SHIPPED | OUTPATIENT
Start: 2025-06-02

## 2025-06-02 RX ORDER — FLUTICASONE PROPIONATE 50 MCG
1 SPRAY, SUSPENSION (ML) NASAL 2 TIMES DAILY
Qty: 18.2 G | Refills: 3 | Status: SHIPPED | OUTPATIENT
Start: 2025-06-02

## 2025-06-02 RX ORDER — AZELASTINE 1 MG/ML
1 SPRAY, METERED NASAL 2 TIMES DAILY
Qty: 30 ML | Refills: 3 | Status: SHIPPED | OUTPATIENT
Start: 2025-06-02

## 2025-06-02 RX ORDER — CYANOCOBALAMIN 1000 UG/ML
1000 INJECTION, SOLUTION INTRAMUSCULAR; SUBCUTANEOUS
Qty: 1 ML | Refills: 11 | Status: SHIPPED | OUTPATIENT
Start: 2025-06-02

## 2025-06-02 RX ORDER — PANTOPRAZOLE SODIUM 40 MG/1
40 TABLET, DELAYED RELEASE ORAL DAILY
Qty: 90 TABLET | Refills: 1 | Status: SHIPPED | OUTPATIENT
Start: 2025-06-02

## 2025-06-02 RX ORDER — ROSUVASTATIN CALCIUM 10 MG/1
10 TABLET, COATED ORAL DAILY
Qty: 90 TABLET | Refills: 1 | Status: SHIPPED | OUTPATIENT
Start: 2025-06-02

## 2025-06-03 ENCOUNTER — TELEPHONE (OUTPATIENT)
Dept: UROLOGY | Facility: HOSPITAL | Age: 60
End: 2025-06-03
Payer: COMMERCIAL

## 2025-06-03 NOTE — TELEPHONE ENCOUNTER
Returned patient phone call; will see her in clinic on Thursday for stent removal.    Renate Ansari LPN

## 2025-06-04 NOTE — PROGRESS NOTES
Subjective   Patient ID: Paola Manley is a 60 y.o. female    HPI  60 y.o. female who presents for a cystoscopic evaluation and stent removal visit with a right kidney stone. S/P right ureteroscopy, laser, stent 05/15/2025. She presented to the ED on 05/08/2025 with complaints of right-sided back and flank pain. She stated that her symptoms started a few days prior but have been waxing and waning. She does have a long history of kidney stones stated that the pain felt similar. She follows Dr. Reyes for urology. She denied any fevers or chills. She denied any urinary symptoms including dysuria. Patient had nausea and vomiting. She believed it is secondary to the pain.      The most recent Calculi (Stone) Analysis, conducted on 05/15/2025, revealed:  Calculi Mass     13 mg    The most recent Urine Culture, conducted on 05/13/2025, revealed:  10,000-49,000 CFU/mL of Candida glabrata        Review of Systems    All systems were reviewed. Anything negative was noted in the HPI.    Objective   Physical Exam    General: Well developed, well nourished, alert and cooperative, appears in no acute distress   Eyes: Non-injected conjunctiva, sclera clear, no proptosis   Cardiac: Extremities are warm and well perfused. No edema, cyanosis or pallor   Lungs: Breathing is easy, non-labored. Speaking in clear and complete sentences. Normal diaphragmatic movement   MSK: Ambulatory with steady gait, unassisted   Neuro: Alert and oriented to person, place, and time   Psych: Demonstrates good judgment and reason, without hallucinations, abnormal affect or abnormal behaviors   Skin: No obvious lesions, no rashes       No CVA tenderness bilaterally   No suprapubic pain or discomfort       Medical History[1]      Surgical History[2]    Procedure:  The patient was prepped using a Betadine solution. Lidocaine jelly was instilled into the urethra. The flexible cystoscope was sterilely inserted into the urethra and stent removed with stent  ermias. For detailed findings of the procedure, please see Dr. Brown’s remarks below  Scope A used, Cipro 500 mg p.o. given.    Patient ID: Paola Manley is a 60 y.o. female.    Cystoscopy    Date/Time: 6/5/2025 11:05 AM    Performed by: Mick Brown MD MPH  Authorized by: Mick Brown MD MPH    Procedure - Bladder Cystoscopy:     Procedure details: simple removal of a foreign body, stone, or stent        Assessment/Plan   Cystoscopic evaluation and stent removal, Kidney stone, Cystitis, LUTS    60 y.o. female who presents for the above condition, We had a very long and extensive discussion with the patient regarding his condition.  I discussed with the patient the pathophysiology, differential diagnosis, risk factor, management of ureteral stones.     Patients stent was removed today with no complications. Stone prevention discussed. Diet reviewed, Discussed fluid intake.  - Advised pt to drink at least 60 to 80 ounces of water daily.     Plan:  - Follow-up in 4 months with Renal US        E&M visit today is associated with current or anticipated ongoing medical care services related to a patient's single, serious condition or a complex condition.     6/5/2025    Scribe Attestation  By signing my name below, I, Christiana Castle attest that this documentation has been prepared under the direction and in the presence of Dr. Mick Brown.          [1]   Past Medical History:  Diagnosis Date    Depression, unspecified     Depressive disorder    Medial meniscus tear 04/24/2023    Ocular rosacea     Personal history of other diseases of the digestive system 01/12/2016    History of esophageal reflux    Personal history of other diseases of the respiratory system 08/13/2018    History of acute sinusitis    Personal history of other endocrine, nutritional and metabolic disease     History of hypothyroidism    Personal history of other specified conditions     History of insomnia    Prediabetes      Prediabetes    Renal stones     right side with stent    Sacro-iliac pain     Tear of meniscus of right knee 04/24/2023    Ulcerative colitis    [2]   Past Surgical History:  Procedure Laterality Date    APPENDECTOMY  01/12/2016    Appendectomy    CHOLECYSTECTOMY  01/12/2016    Cholecystectomy    HERNIA REPAIR  01/12/2016    Inguinal Hernia Repair    HYSTERECTOMY  11/07/2017    Hysterectomy    MR HEAD ANGIO WO IV CONTRAST  02/06/2019    MR HEAD ANGIO WO IV CONTRAST 2/6/2019 [facility]    MR NECK ANGIO WO IV CONTRAST  02/06/2019    MR NECK ANGIO WO IV CONTRAST 2/6/2019 [facility]    OTHER SURGICAL HISTORY  08/12/2020    Knee arthroscopy    PANCREAS SURGERY      SINUS SURGERY  01/12/2016    Sinus Surgery    SPLENECTOMY, TOTAL  01/12/2016    Splenectomy    TOTAL ABDOMINAL HYSTERECTOMY W/ BILATERAL SALPINGOOPHORECTOMY  01/12/2016    Total Abdominal Hysterectomy With Removal Of Both Ovaries

## 2025-06-05 ENCOUNTER — OFFICE VISIT (OUTPATIENT)
Dept: UROLOGY | Facility: HOSPITAL | Age: 60
End: 2025-06-05
Payer: COMMERCIAL

## 2025-06-05 VITALS — DIASTOLIC BLOOD PRESSURE: 68 MMHG | HEART RATE: 96 BPM | SYSTOLIC BLOOD PRESSURE: 138 MMHG

## 2025-06-05 DIAGNOSIS — N20.0 KIDNEY STONE ON RIGHT SIDE: Primary | ICD-10-CM

## 2025-06-05 PROCEDURE — 52310 CYSTOSCOPY AND TREATMENT: CPT | Performed by: STUDENT IN AN ORGANIZED HEALTH CARE EDUCATION/TRAINING PROGRAM

## 2025-06-05 PROCEDURE — 3078F DIAST BP <80 MM HG: CPT | Performed by: STUDENT IN AN ORGANIZED HEALTH CARE EDUCATION/TRAINING PROGRAM

## 2025-06-05 PROCEDURE — 3044F HG A1C LEVEL LT 7.0%: CPT | Performed by: STUDENT IN AN ORGANIZED HEALTH CARE EDUCATION/TRAINING PROGRAM

## 2025-06-05 PROCEDURE — 3075F SYST BP GE 130 - 139MM HG: CPT | Performed by: STUDENT IN AN ORGANIZED HEALTH CARE EDUCATION/TRAINING PROGRAM

## 2025-06-05 PROCEDURE — 3048F LDL-C <100 MG/DL: CPT | Performed by: STUDENT IN AN ORGANIZED HEALTH CARE EDUCATION/TRAINING PROGRAM

## 2025-06-10 ENCOUNTER — HOSPITAL ENCOUNTER (OUTPATIENT)
Dept: RADIOLOGY | Facility: HOSPITAL | Age: 60
End: 2025-06-10
Payer: COMMERCIAL

## 2025-06-15 ENCOUNTER — PHARMACY VISIT (OUTPATIENT)
Dept: PHARMACY | Facility: CLINIC | Age: 60
End: 2025-06-15
Payer: COMMERCIAL

## 2025-06-20 ENCOUNTER — APPOINTMENT (OUTPATIENT)
Dept: UROLOGY | Facility: CLINIC | Age: 60
End: 2025-06-20
Payer: COMMERCIAL

## 2025-06-24 ENCOUNTER — TELEMEDICINE (OUTPATIENT)
Dept: PRIMARY CARE | Facility: CLINIC | Age: 60
End: 2025-06-24
Payer: COMMERCIAL

## 2025-06-24 DIAGNOSIS — J06.9 ACUTE URI: ICD-10-CM

## 2025-06-24 DIAGNOSIS — J01.90 ACUTE NON-RECURRENT SINUSITIS, UNSPECIFIED LOCATION: Primary | ICD-10-CM

## 2025-06-24 PROCEDURE — 3044F HG A1C LEVEL LT 7.0%: CPT | Performed by: STUDENT IN AN ORGANIZED HEALTH CARE EDUCATION/TRAINING PROGRAM

## 2025-06-24 PROCEDURE — 99214 OFFICE O/P EST MOD 30 MIN: CPT | Performed by: STUDENT IN AN ORGANIZED HEALTH CARE EDUCATION/TRAINING PROGRAM

## 2025-06-24 PROCEDURE — 3048F LDL-C <100 MG/DL: CPT | Performed by: STUDENT IN AN ORGANIZED HEALTH CARE EDUCATION/TRAINING PROGRAM

## 2025-06-24 RX ORDER — AMOXICILLIN AND CLAVULANATE POTASSIUM 875; 125 MG/1; MG/1
875 TABLET, FILM COATED ORAL 2 TIMES DAILY
Qty: 20 TABLET | Refills: 0 | Status: SHIPPED | OUTPATIENT
Start: 2025-06-24 | End: 2025-07-04

## 2025-06-24 RX ORDER — METHYLPREDNISOLONE 4 MG/1
TABLET ORAL
Qty: 21 TABLET | Refills: 0 | Status: SHIPPED | OUTPATIENT
Start: 2025-06-24

## 2025-06-24 NOTE — PROGRESS NOTES
Chief Complaint  Patient ID: Paola Manley is a 60 y.o. female is presenting for a virtual visit.  We utilized an interactive audio and video telecommunication system which permits real time communications between this patient (at the originating site) and provider (at this site) to provide a telehealth service for Sick Visit.    Verbal consent was requested and obtained from Paola Manley on this date of service for said telehealth visit.          Reviewed all medications by prescribing practitioner or clinical pharmacist (such as prescriptions, OTCs, herbal therapies and supplements) and documented in the medical record.    History of Present Illness  Patient is calling the office today to discuss a 5-day history of an acute URI    Unfortunate she was alongside family who were sick including her daughter and grandkids  They all tested negative for COVID-19 but did note that they were having URI symptoms as well as a slight temperature    She has been noticing worsening cough and URI symptoms including ear congestion, sinus congestion, loss of voice, and the overall feeling of the systemic disease    Denies any difficulty breathing or any chest pain    Wishes to discuss this further as unfortunately her stepfather recently passed away and she does have a  later this week    Review of Systems  All pertinent positive symptoms are included in the history of present illness.    All other systems have been reviewed and are negative and noncontributory to this patient's current ailments.    Past Medical History  She has a past medical history of Depression, unspecified, Medial meniscus tear (2023), Ocular rosacea, Personal history of other diseases of the digestive system (2016), Personal history of other diseases of the respiratory system (2018), Personal history of other endocrine, nutritional and metabolic disease, Personal history of other specified conditions, Prediabetes, Renal stones,  Sacro-iliac pain, Tear of meniscus of right knee (04/24/2023), and Ulcerative colitis.    Surgical History  She has a past surgical history that includes Total abdominal hysterectomy w/ bilateral salpingoophorectomy (01/12/2016); Appendectomy (01/12/2016); Splenectomy, total (01/12/2016); Sinus surgery (01/12/2016); Hernia repair (01/12/2016); Cholecystectomy (01/12/2016); Other surgical history (08/12/2020); Hysterectomy (11/07/2017); MR angio head wo IV contrast (02/06/2019); MR angio neck wo IV contrast (02/06/2019); and Pancreas surgery.     Social History  She reports that she has been smoking cigarettes. She has a 4 pack-year smoking history. She has never used smokeless tobacco. She reports that she does not drink alcohol and does not use drugs.    Family History[1]  Medications Prior to Visit[2]  Allergies  Bactrim [sulfamethoxazole-trimethoprim], Cephalexin, Ciprofloxacin, Darvocet a500 [propoxyphene n-acetaminophen], Nsaids (non-steroidal anti-inflammatory drug), and Vicodin [hydrocodone-acetaminophen]    Immunization History   Administered Date(s) Administered    Flu vaccine (IIV4), preservative free *Check age/dose* 10/11/2017, 12/20/2018, 09/28/2020, 11/24/2021, 12/29/2023    Flu vaccine, trivalent, preservative free, age 6 months and greater (Fluarix/Fluzone/Flulaval) 11/12/2013, 01/23/2025    Hib (PRP-D) 12/21/2012    Influenza, Unspecified 11/24/2021    Influenza, seasonal, injectable 01/07/2015, 10/06/2015    Meningococcal MCV4, Unspecified 12/21/2012    Meningococcal, Unknown Serogroups 12/21/2012    Moderna COVID-19 vaccine, 12 years and older (50mcg/0.5mL)(Spikevax) 12/29/2023    Moderna SARS-CoV-2 Vaccination 04/08/2021, 05/06/2021, 01/17/2022    Pneumococcal Conjugate PCV 7 12/21/2012    Pneumococcal conjugate vaccine, 20-valent (PREVNAR 20) 12/15/2023    Tdap vaccine, age 7 year and older (BOOSTRIX, ADACEL) 11/11/2013, 12/15/2023    Zoster vaccine, recombinant, adult (SHINGRIX) 01/17/2022,  01/24/2023     Objective   Visit Vitals  OB Status Postmenopausal   Smoking Status Every Day     No visits with results within 1 Month(s) from this visit.   Latest known visit with results is:   Admission on 05/15/2025, Discharged on 05/15/2025   Component Date Value    POCT Glucose 05/15/2025 105 (H)     Calculi Mass 05/15/2025 13     Calculi Description 05/15/2025 See Note     Calculi Composition 05/15/2025 See Note     POCT Glucose 05/15/2025 76      Physical Exam  CONSTITUTIONAL - well nourished, well developed, looks like stated age, in no acute distress appears slightly fatigued and slightly ill  SKIN - normal skin color and pigmentation  EYES - normal external exam  LUNGS - breathing comfortably, no dyspnea  EXTREMITIES - no deformities noticeable on camera  NEUROLOGICAL - oriented and no focal signs  PSYCHIATRIC - alert, pleasant and cordial, not anxious or depressed appearing    Assessment and Plan  Problem List Items Addressed This Visit    None  Visit Diagnoses         Acute non-recurrent sinusitis, unspecified location    -  Primary    Relevant Medications    amoxicillin-clavulanate (Augmentin) 875-125 mg tablet    methylPREDNISolone (Medrol Dospak) 4 mg tablets      Acute URI        Relevant Medications    amoxicillin-clavulanate (Augmentin) 875-125 mg tablet    methylPREDNISolone (Medrol Dospak) 4 mg tablets        With a long conversation about this being a systemic process at this time due to your signs/symptoms    I did provide antibiotic therapy alongside a Medrol Dosepak    Risks, benefits, and options of treatment(s) were discussed after reviewing all current medication(s) and drug allergy(ies)  I opted for the treatment that we discussed with instructions on the medication use for your underlying medical ailment(s)    I encouraged supportive care such as rest, fluids and Advil/Tylenol as warranted  Return to the clinic in 3-5 days or sooner if symptoms persist as we will then further  evaluate    If emergency warning signs/symptoms occur such as trouble breathing, persistent pain or pressure in the chest, new confusion, inability to wake or stay awake, or bluish lips or face, you need to seek emergency medical care immediately.          [1]   Family History  Problem Relation Name Age of Onset    Arthritis Mother      Heart disease Mother      Other (CVA) Mother      Coronary artery disease Mother      Diabetes Mother      Osteoporosis Mother      Heart failure Mother      Kidney failure Mother      Alcohol abuse Father      Hypertension Father      Lung cancer Father      Diabetes Maternal Grandmother      Liver cancer Maternal Grandmother      Throat cancer Maternal Grandfather      Osteoarthritis Other          Grandmother   [2]   Outpatient Medications Prior to Visit   Medication Sig Dispense Refill    azelastine (Astelin) 137 mcg (0.1 %) nasal spray Administer 1 spray into each nostril 2 times a day. 30 mL 3    buPROPion XL (Wellbutrin XL) 150 mg 24 hr tablet Take 1 tablet (150 mg) by mouth once daily in the morning. Take before meals.      cloNIDine (Catapres) 0.2 mg tablet TAKE ONE (1) TABLET AT BEDTIME      cyanocobalamin (Vitamin B-12) 1,000 mcg/mL injection Inject 1 mL (1,000 mcg) into the muscle every 30 (thirty) days. 1 mL 11    doxycycline (Adoxa) 150 mg tablet Take 1 tablet (150 mg) by mouth once daily.      DULoxetine (Cymbalta) 30 mg DR capsule Take 1 capsule (30 mg) by mouth once daily. To be taken in combination with duloxetine 60 mg daily for a total of 90 mg daily 30 capsule 5    DULoxetine (Cymbalta) 60 mg DR capsule Take 1 capsule (60 mg) by mouth once daily. To be taken in combination with duloxetine 30 mg daily for total dose of 90 mg daily 30 capsule 5    fluticasone (Flonase) 50 mcg/actuation nasal spray Administer 1 spray into each nostril 2 times a day. 18.2 g 3    hydrOXYzine pamoate (Vistaril) 25 mg capsule Take 1 capsule (25 mg) by mouth 3 times a day as needed.       levothyroxine (Synthroid, Levoxyl) 88 mcg tablet Take 1 tablet (88 mcg) by mouth early in the morning.. Please take by mouth every other day in an alternating fashion opposite of the 100 mcg 90 tablet 0    linaCLOtide (Linzess) 290 mcg capsule Take 1 capsule (290 mcg) by mouth once daily in the morning. Take before meals. DO NOT CRUSH OR CHEW 30 capsule 5    melatonin 10 mg capsule TAKE 1 CAPSULE Bedtime PRN      metaxalone (Skelaxin) 800 mg tablet TAKE ONE-HALF (1/2) TABLET TWO (2) TIMES A DAY AS NEEDED FOR MUSCLE SPASM 30 tablet 2    pantoprazole (ProtoNix) 40 mg EC tablet Take 1 tablet (40 mg) by mouth once daily. 90 tablet 1    pregabalin (Lyrica) 200 mg capsule Take 1 capsule (200 mg) by mouth 3 times a day. 90 capsule 5    rosuvastatin (Crestor) 10 mg tablet Take 1 tablet (10 mg) by mouth once daily. 90 tablet 1    sodium hyaluronate (Durolane) 60 mg/3 mL injection INJECTION 3ML, ONE DOSE INTO BOTH KNEES ONE TIME 6 mL 0    tirzepatide (Mounjaro) 15 mg/0.5 mL pen injector Inject 15 mg under the skin 1 (one) time per week. 6 mL 1    triamcinolone (Kenalog) 0.1 % cream APPLY TO LEGS TWO (2) TIMES A DAY AS NEEDED      Vyvanse 20 mg capsule Take 1 capsule (20 mg) by mouth once daily. Take in the morning       No facility-administered medications prior to visit.

## 2025-06-27 ENCOUNTER — APPOINTMENT (OUTPATIENT)
Dept: ORTHOPEDIC SURGERY | Facility: CLINIC | Age: 60
End: 2025-06-27
Payer: COMMERCIAL

## 2025-06-27 DIAGNOSIS — M17.0 ARTHRITIS OF BOTH KNEES: ICD-10-CM

## 2025-06-27 DIAGNOSIS — M25.572 BILATERAL ANKLE PAIN, UNSPECIFIED CHRONICITY: ICD-10-CM

## 2025-06-27 DIAGNOSIS — M25.571 BILATERAL ANKLE PAIN, UNSPECIFIED CHRONICITY: ICD-10-CM

## 2025-06-27 PROCEDURE — 99214 OFFICE O/P EST MOD 30 MIN: CPT | Performed by: ORTHOPAEDIC SURGERY

## 2025-06-27 PROCEDURE — 20610 DRAIN/INJ JOINT/BURSA W/O US: CPT | Performed by: ORTHOPAEDIC SURGERY

## 2025-06-27 PROCEDURE — 3044F HG A1C LEVEL LT 7.0%: CPT | Performed by: ORTHOPAEDIC SURGERY

## 2025-06-27 PROCEDURE — 3048F LDL-C <100 MG/DL: CPT | Performed by: ORTHOPAEDIC SURGERY

## 2025-06-27 ASSESSMENT — PAIN - FUNCTIONAL ASSESSMENT: PAIN_FUNCTIONAL_ASSESSMENT: 0-10

## 2025-06-27 ASSESSMENT — PAIN SCALES - GENERAL: PAINLEVEL_OUTOF10: 7

## 2025-06-27 NOTE — PROGRESS NOTES
This is a consultation from Dr. Addy Storm DO for   Chief Complaint   Patient presents with    Left Knee - Arthritis     LORETTA LEE&BILL    Right Knee - Arthritis       This is a 60 y.o. female who presents for follow-up of her bilateral knees.  Patient has bilateral knee arthritis, she had cortisone injections about 3 months ago.  They are helpful and lasted for a while.  The last few weeks she has had more wear off and she has been having worsening pain.  Sharp pain over the medial and lateral knee on both sides worse with walking.  She has had some falls in the past.  She does not use a brace or cane.  No numbness or tingling no fevers or chills.    Physical Exam    There has been no interval change in this patient's past medical, surgical, medications, allergies, family history or social history since the most recent visit to a provider within our department. 14 point review of systems was performed, reviewed, and negative except for pertinent positives documented in the history of present illness.     Constitutional: well developed, well nourished female in no acute distress  Psychiatric: normal mood, appropriate affect  Eyes: sclera anicteric  HENT: normocephalic/atraumatic  CV: regular rate and rhythm   Respiratory: non labored breathing  Integumentary: no rash  Neurological: moves all extremities    Bilateral knee exam: skin intact no lacerations or abrations. no effusion.  Tender medial and lateral joint line. negative log roll negative patellar grind. ROM 0-120. stable to varus and valgus stress at 0 and 30 degrees. negative lachman negative posterior drawer negative esha. 5/5 ehl/fhl/gs/ta. silt s/s/sp/dp/t. 2+ dp/pt        Imaging:  Xrays were ordered by me, they were reviewed and independently interpreted by me today, they show bilateral knee arthritis    L Inj/Asp: bilateral knee on 6/27/2025 8:50 AM  Indications: pain and joint swelling  Details: 22 G needle, anterolateral  approach  Medications (Right): 60 mg sodium hyaluronate 60 mg/3 mL  Medications (Left): 60 mg sodium hyaluronate 60 mg/3 mL    Discussion:  I discussed the conservative treatment options for knee osteoarthritis including but not limited to physical therapy, oral NSAIDS, activity and lifestyle modification, hyaluronic acid injections and corticosteroid injections. Pt has elected to undergo a hyaluronic acid injection today. I have explained the risk and benefits of an injection including the possibility of joint infection, bleeding, damage to cartilage, allergic reaction. Patient verbalized understanding and gave verbal consent wishes to proceed with a intra-articular hyaluronic acid injection for their knee.    Procedure:  After discussing the risk and benefits of the procedure, we proceeded with an intra-articular bilateral knee injection. We discussed the risks and benefits and potential morbidity related to the treatment, and to the prescription medication administered in the injection    With the patient's informed verbal consent, the bilateral knees were prepped in standard sterile fashion with Chlorhexidine. The skin was then anesthetized with ethyl chloride spray and cleaned again with Chlorhexidine. The bilateral knees were then apirated/injected with a prefilled 20-gauge syringe of 3ml/60mg Durolane in each knee using the lateral approach without complications.  The patient tolerated this well.  A bandaid was applied and the patient ambulated out of the clinic on ther own accord without difficulty. Patient was instructed to avoid physical activity for 24-48 hours to prevent the knees from swelling and may ice the knees as tolerated. Patient should contact the office if any signs of of infection appear: redness, fever, chills, drainage, swelling or warmth to the knees.        Procedure, treatment alternatives, risks and benefits explained, specific risks discussed. Consent was given by the patient.  "Immediately prior to procedure a time out was called to verify the correct patient, procedure, equipment, support staff and site/side marked as required. Patient was prepped and draped in the usual sterile fashion.             Impression/Plan: This is a 60 y.o. female with bilateral knee arthritis.  I had an in depth discussion with the patient regarding treatment options for arthritis and their relative risks and benefits. We reviewed surgical and nonsurgical option for treatment. Treatments include anti inflammatory medications, physical therapy, weight loss, activity modification, use of assistive devices, injection therapies. We discussed current prescriptions and risks and benefits of continuation of prescription medication as apporpriate. We discussed that arthritis is often progressive over time, an in end stage arthritis surgical interventions can be considered, including arthroplasty. All questions were answered and the patient voiced their understanding.  Will continue gel injections for her today, they were helpful for her in the past.  We also discussed therapy to help with her strength and balance of her knees and ankles.  I will see her back as needed    BMI Readings from Last 1 Encounters:   06/02/25 32.99 kg/m²      Lab Results   Component Value Date    CREATININE 0.95 05/08/2025     Tobacco Use: High Risk (6/27/2025)    Patient History     Smoking Tobacco Use: Every Day     Smokeless Tobacco Use: Never     Passive Exposure: Not on file      Computed MELD 3.0 unavailable. One or more values for this score either were not found within the given timeframe or did not fit some other criterion.  Computed MELD-Na unavailable. One or more values for this score either were not found within the given timeframe or did not fit some other criterion.       Lab Results   Component Value Date    HGBA1C 5.3 01/17/2025     No results found for: \"STAPHMRSASCR\"  "

## 2025-07-01 ENCOUNTER — OFFICE VISIT (OUTPATIENT)
Dept: PAIN MEDICINE | Facility: HOSPITAL | Age: 60
End: 2025-07-01
Payer: COMMERCIAL

## 2025-07-01 VITALS
WEIGHT: 217 LBS | HEART RATE: 75 BPM | SYSTOLIC BLOOD PRESSURE: 115 MMHG | HEIGHT: 68 IN | BODY MASS INDEX: 32.89 KG/M2 | RESPIRATION RATE: 16 BRPM | DIASTOLIC BLOOD PRESSURE: 75 MMHG | OXYGEN SATURATION: 99 %

## 2025-07-01 DIAGNOSIS — Z79.899 LONG-TERM USE OF HIGH-RISK MEDICATION: Primary | ICD-10-CM

## 2025-07-01 DIAGNOSIS — M47.817 SPONDYLOSIS OF LUMBOSACRAL REGION WITHOUT MYELOPATHY OR RADICULOPATHY: ICD-10-CM

## 2025-07-01 DIAGNOSIS — M46.1 SACROILIITIS: ICD-10-CM

## 2025-07-01 DIAGNOSIS — M54.12 CERVICAL RADICULITIS: ICD-10-CM

## 2025-07-01 PROCEDURE — 3078F DIAST BP <80 MM HG: CPT | Performed by: CLINICAL NURSE SPECIALIST

## 2025-07-01 PROCEDURE — 3048F LDL-C <100 MG/DL: CPT | Performed by: CLINICAL NURSE SPECIALIST

## 2025-07-01 PROCEDURE — 3074F SYST BP LT 130 MM HG: CPT | Performed by: CLINICAL NURSE SPECIALIST

## 2025-07-01 PROCEDURE — 99214 OFFICE O/P EST MOD 30 MIN: CPT | Performed by: CLINICAL NURSE SPECIALIST

## 2025-07-01 PROCEDURE — 3008F BODY MASS INDEX DOCD: CPT | Performed by: CLINICAL NURSE SPECIALIST

## 2025-07-01 PROCEDURE — 3044F HG A1C LEVEL LT 7.0%: CPT | Performed by: CLINICAL NURSE SPECIALIST

## 2025-07-01 SDOH — SOCIAL STABILITY: SOCIAL NETWORK: SOCIAL ACTIVITY:: 1

## 2025-07-01 ASSESSMENT — ENCOUNTER SYMPTOMS
OCCASIONAL FEELINGS OF UNSTEADINESS: 0
DEPRESSION: 0
LOSS OF SENSATION IN FEET: 0

## 2025-07-01 NOTE — ASSESSMENT & PLAN NOTE
60-year-old female with history of low back pain, sacroiliitis, cervical pain and fibromyalgia presents for follow-up.  She has done very well with SI joint injections providing significant/sustained relief.  Continued relief with her most recent SI joint injection.  She was most bothered by cervical pain radiating into her right upper extremity at her last office visit.  Reviewed cervical MRI and patient was scheduled for cervical epidural steroid injection at C7/T1.  Patient is endorsing approximately 65% relief with this injection.  She states that she currently does not have pain radiating into her upper extremities.  She continues to endorse neck pain and stiffness which appears to have a myofascial component. Advised patient that she may repeat her cervical epidural steroid injection at C7/T1 every 3 to 4 months as needed for cervical radicular symptoms.  Recommended she continue home physical therapy exercises and stretches.  Recommended continuing traction as she feels this is beneficial.  Also advised patient that she may benefit from massage for myofascial component of pain.  Currently doing well with her current medication regimen.  Feels that this regimen keeps her pain tolerable.  Recommend she continue to manage her pain with Lyrica, duloxetine and Skelaxin. Recommended that she may continue to supplement with Tylenol, however, limit her total Tylenol dose to 3000 mg in 24 hours. Advised patient that she may repeat her SI joint injections every 3 to 4 months if needed.  Plan reviewed with patient at today's visit.    - As patient that she may repeat a cervical epidural steroid injection at C7/T1 every 3 to 4 months as needed for cervical radicular symptoms.  Patient may contact our office if symptoms should worsen and she would like to repeat this injection.   -Patient may repeat bilateral SI joint injections every 3 to 4 months as needed.  Currently receiving relief from her most recent injection.     -Advised patient to continue home physical therapy exercises and stretches as well as traction.  Recommended that she may benefit from massage for myofascial component of pain.      - Continue pregabalin 200 mg 3 times daily as needed.  Patient feels that this medication has been very beneficial and continues to receive excellent relief without adverse effects.  - Continue duloxetine 90 mg daily.  Per patient her psychiatrist is in agreement with this dose and would like us to take over prescribing this medication.  -Continue Skelaxin for muscle tightness and spasm.  -Patient may continue to supplement with Tylenol; advised to limit total Tylenol dose to less than 3000 mg in 24 hours.    -She may supplement with lidocaine patches and Voltaren gel as needed.  -Due to the patient being on multiple medications that may affect serotonin we will watch closely for serotonin syndrome.  Currently denies any symptoms consistent with serotonin syndrome.  -Given the patient's report of fibromyalgia pain  I discussed with her in detail the recommendations most current with the [employer] in the treatment of fibromyalgia.  This includes most recent research showing that the most effective treatment for fibromyalgia is physical exercise.  Cognitive behavioral therapy has also been shown to have positive effects towards pain and other symptoms related to fibromyalgia.  Adjuvant treatments including acupuncture chiropractic and massage therapy have also been beneficial.  Specifically related to medication the FDA has approved few medications directly for the treatment of fibromyalgia and these include Cymbalta and Savella as well as older medications including Elavil.  Medications outside of the serotonin and norepinephrine realms include muscle relaxants as well as Lyrica and gabapentin.  Most importantly the College's recommendation is to avoid opiate narcotic medication for treating fibromyalgia the evidence  shows at these drugs are not helpful to most people with fibromyalgia and in fact will cause greater pain sensitivity or make pain persistent  -Patient will follow-up approximately 6 months or sooner if needed.

## 2025-07-01 NOTE — PROGRESS NOTES
Subjective   Patient ID: Paola Manley is a 60 y.o. female who presents for cervical pain, SI pain, lumbar pain and fibromyalgia.  HPI    60-year-old female with history of low back pain, sacroiliitis, fibromyalgia pain and cervical pain presents for follow-up.  Polyarticular pain most noted in her knees and shoulders.  Followed by orthopedics for polyarticular symptoms.  She has done physical therapy in the past with limited relief.  Continue home therapy exercises, stretches and traction. She was sent for formal course of physical therapy by PM&R targeting her cervical symptoms with some relief.  She proceeded with trigger point injections which provided limited relief.  She was sent for an updated cervical MRI.  After review of MRI and patient's symptoms consistent with cervical radicular pain, she was scheduled for a cervical epidural steroid injection at C7/T1.  She has done well with bilateral SI joint injections providing significant/sustained relief.  Manages her pain with Lyrica 200 mg 3 times daily, duloxetine 90 mg daily and Skelaxin for muscle tightness and spasms.  Supplements with occasional Tylenol, lidocaine patches and Voltaren gel.  Presenting at today's office visit for follow-up after proceeding with a cervical epidural steroid injection at C7/T1 on 5/23/2025.  Patient states that she received approximately 65% relief of radicular pain.  She has noted continued relief of radicular pain radiating into her right upper extremity.  Continues to experiencing neck pain and stiffness accompanied by muscle tightness.  Neck pain is nonradiating at today's visit.  She denies any upper extremity weakness and does feel that the injection may have increased the strength in her upper extremities.  She has numbness and tingling in the 4th and 5th digits of her left hand.  Continues to experience low back pain which is nonradiating.  Denies numbness/tingling or weakness in her lower extremities.  Continued relief  from her most recent SI joint injection.  Denies pain over bilateral SI joints.  Persistent widespread/polyarticular pain most bothersome to bilateral knees at today's visit.  Continues to follow with orthopedics for joint injections.  Most recently had a left knee Durolane injection.  Orthopedics also sent her for physical therapy targeting lower extremity strength/ankle strength after recent fall.  Patient states she fell after tripping and felt that it may have been due to weakness in her ankles.  She has not started her physical therapy.  Currently pain is aching and throbbing.  Pain is more intense at bedtime.  Pain accompanied by muscle tightness and myofascial tenderness most noted in her cervical region.  She is continuing home therapy exercises and stretches including traction which she feels has been beneficial.  She will utilize heat which is helpful.  Continued benefit with Lyrica, duloxetine, Skelaxin, lidocaine patches, Voltaren gel and occasional Tylenol.      Location of Pain: pt presents for injection follow up. Pt states she has low back pain that does not radiate. Pt has fibromyalgia. Neck pain near occipital area and has had some numbness in left two fingers.    Patient was in ER with kidney stone and had pain medication.     Multiple falls in the past month.    PT ordered for knees per Tesfaye.         Pain Score:   1/10-back                           4/10-cervical pain                           6/10- pain at worst    Treatment:  Lyrica-no refill needed  Last dose: 200 mg TID    Efficacy: 90%    Side Effects: none    Other pain medication/neuromodulator: Tylenol arthritis -taking 6 a day/ 90mg Cymbalta total-90mg/ Skelaxin-no refills needed    Injections and/or Procedures: Dr. Carlin gel injections bilateral knees-6/2025   Bilateral SI Injection-90% relief   3/21/25 Bilateral SI Injection: 100% relief   5/23/25 STEPHEN C7-T1-65% relief with pain for 1 week and strength has improved    Other:  none      OARRS:  No data recorded  I have personally reviewed the OARRS report for Paola Manley. I have considered the risks of abuse, dependence, addiction and diversion    Is the patient prescribed a combination of a benzodiazepine and opioid?  No    Last Urine Drug Screen / ordered today: Yes  Recent Results (from the past 8760 hours)   Amphetamine Confirm, Urine    Collection Time: 08/14/24 12:28 PM   Result Value Ref Range    Methamphetamine Quant, Ur <200 ng/mL    MDA, Urine <200 ng/mL    MDEA, Urine <200 ng/mL    Phentermine,Urine <200 ng/mL    Amphetamines,Urine 4362 ng/mL    MDMA, Urine <200 ng/mL   Confirmation Opiate/Opioid/Benzo Prescription Compliance    Collection Time: 08/14/24 12:28 PM   Result Value Ref Range    Clonazepam <25 <25 ng/mL    7-Aminoclonazepam <25 <25 ng/mL    Alprazolam <25 <25 ng/mL    Alpha-Hydroxyalprazolam <25 <25 ng/mL    Midazolam <25 <25 ng/mL    Alpha-Hydroxymidazolam <25 <25 ng/mL    Chlordiazepoxide <25 <25 ng/mL    Diazepam <25 <25 ng/mL    Nordiazepam <25 <25 ng/mL    Temazepam <25 <25 ng/mL    Oxazepam <25 <25 ng/mL    Lorazepam <25 <25 ng/mL    Methadone <25 <25 ng/mL    EDDP <25 <25 ng/mL    6-Acetylmorphine <25 <25 ng/mL    Codeine <50 <50 ng/mL    Hydrocodone <25 <25 ng/mL    Hydromorphone <25 <25 ng/mL    Morphine  <50 <50 ng/mL    Norhydrocodone <25 <25 ng/mL    Noroxycodone <25 <25 ng/mL    Oxycodone <25 <25 ng/mL    Oxymorphone <25 <25 ng/mL    Fentanyl <2.5 <2.5 ng/mL    Norfentanyl <2.5 <2.5 ng/mL    Tramadol <50 <50 ng/mL    O-Desmethyltramadol <50 <50 ng/mL    Zolpidem <25 <25 ng/mL    Zolpidem Metabolite (ZCA) <25 <25 ng/mL   Screen Opiate/Opioid/Benzo Prescription Compliance    Collection Time: 08/14/24 12:28 PM   Result Value Ref Range    Creatinine, Urine Random 169.2 20.0 - 320.0 mg/dL    Amphetamine Screen, Urine Presumptive Positive (A) Presumptive Negative    Barbiturate Screen, Urine Presumptive Negative Presumptive Negative    Cannabinoid Screen,  Urine Presumptive Negative Presumptive Negative    Cocaine Metabolite Screen, Urine Presumptive Negative Presumptive Negative    PCP Screen, Urine Presumptive Negative Presumptive Negative   Tapentadol Confirmation, Urine    Collection Time: 08/14/24 12:28 PM   Result Value Ref Range    Tapentadol <25 <25 ng/mL    N-Desmethyltapentadol <25 <25 ng/mL   Buprenorphine Confirm,Urine    Collection Time: 08/14/24 12:28 PM   Result Value Ref Range    BUPRENORPHINE GLUC, URINE <5 ng/mL    BUPRENORPHINE ,URINE <2 ng/mL    NALOXONE, URINE <100 ng/mL    NORBUPRENORPHINE GLUC,URINE <5 ng/mL    NORBUPRENORPHINE, URINE <2 ng/mL     Results are as expected.     Controlled Substance Agreement:  Date of the Last Agreement: 8/14/25  Reviewed Controlled Substance Agreement including but not limited to the benefits, risks, and alternatives to treatment with a Controlled Substance medication(s).    Monitoring and compliance:    ORT:    PDUQ:    Office Agreement:      Review of Systems    ROS:   General: No fevers, chills, weight loss  Skin: Negative for lesions  Eyes: No acute vision changes  Ears: No vertigo  Nose, mouth, throat: No difficulty swallowing or speaking  Respiratory: No cough, shortness of breath, cyanosis  Cardiovascular: Negative for chest pain syncope or palpitation  Gastrointestinal: No constipation, nausea, vomiting  Neurological: Negative for headache, positive for: Paresthesia and weakness  Psychological: Negative for severe or debilitating anxiety, depression. Negative memory loss  Musculoskeletal: Positive for  Endocrine: Negative for weight gain, appetite changes, excessive sweating  Allergy/immune: Negative    All 13 systems were reviewed and are within normal levels except as noted or in the history of present illness.  Positive or pertinent negative responses are noted or were in the history of present illness. As noted, the patient denies significant or impairing weakness in the bilateral upper and lower  extremities, medication induced constipation, and bowel or bladder incontinence.   Current Medications[1]     Medical History[2]     Surgical History[3]     Family History[4]     RX Allergies[5]     Objective     Visit Vitals  OB Status Postmenopausal   Smoking Status Every Day        Physical Exam    PE:  General: Well-developed, well-nourished, no acute distress. The patient demonstrates no pain behavior, symptom magnification or overt drug-seeking behavior.  Eye: Pupils appropriate for room lighting  Neck/thyroid: No obvious goiter or enlargement of neck noted  Respiratory exam: Normal respiratory effort, unlabored respiration. No accessory muscle use noted  Cardiac exam: Bilateral radial pulses intact  Abdominal: Nondistended  Spine, cervical: Tenderness to paraspinous musculature paracervical region bilaterally.  Myofascial tenderness and muscle tightness upper trapezius muscles right greater than left.  Flexion intact with extension limited and increasing pain.  Rotational twisting intact.  Negative for tenderness over bilateral occiputs.    Spine, lumbar: The patient is able to rise from a seated to standing position without hesitancy, push off, or delay. Gait is grossly nonantalgic.  Mild tenderness to paraspinous musculature is noted lumbar region bilaterally.  Flexion and extension intact.  Negative straight leg raise.  Negative SI tenderness.  Neurologic exam: Muscle strength is antigravity in all 4 extremities.  Equal strength bilateral upper and lower extremities.  Normal sensation bilateral upper and lower extremities.  Psychiatric exam: Judgment and insight normal, affect normal, speech is fluent, affect appropriate, demonstrating no signs of hypersomnolence, sedation, or confusion        Assessment/Plan   Problem List Items Addressed This Visit           ICD-10-CM    Spondylosis of lumbosacral region M47.817    Sacroiliitis M46.1    Cervical radiculitis M54.12    60-year-old female with history of  low back pain, sacroiliitis, cervical pain and fibromyalgia presents for follow-up.  She has done very well with SI joint injections providing significant/sustained relief.  Continued relief with her most recent SI joint injection.  She was most bothered by cervical pain radiating into her right upper extremity at her last office visit.  Reviewed cervical MRI and patient was scheduled for cervical epidural steroid injection at C7/T1.  Patient is endorsing approximately 65% relief with this injection.  She states that she currently does not have pain radiating into her upper extremities.  She continues to endorse neck pain and stiffness which appears to have a myofascial component. Advised patient that she may repeat her cervical epidural steroid injection at C7/T1 every 3 to 4 months as needed for cervical radicular symptoms.  Recommended she continue home physical therapy exercises and stretches.  Recommended continuing traction as she feels this is beneficial.  Also advised patient that she may benefit from massage for myofascial component of pain.  Currently doing well with her current medication regimen.  Feels that this regimen keeps her pain tolerable.  Recommend she continue to manage her pain with Lyrica, duloxetine and Skelaxin. Recommended that she may continue to supplement with Tylenol, however, limit her total Tylenol dose to 3000 mg in 24 hours. Advised patient that she may repeat her SI joint injections every 3 to 4 months if needed.  Plan reviewed with patient at today's visit.    - As patient that she may repeat a cervical epidural steroid injection at C7/T1 every 3 to 4 months as needed for cervical radicular symptoms.  Patient may contact our office if symptoms should worsen and she would like to repeat this injection.   -Patient may repeat bilateral SI joint injections every 3 to 4 months as needed.  Currently receiving relief from her most recent injection.    -Advised patient to continue home  physical therapy exercises and stretches as well as traction.  Recommended that she may benefit from massage for myofascial component of pain.      - Continue pregabalin 200 mg 3 times daily as needed.  Patient feels that this medication has been very beneficial and continues to receive excellent relief without adverse effects.  - Continue duloxetine 90 mg daily.  Per patient her psychiatrist is in agreement with this dose and would like us to take over prescribing this medication.  -Continue Skelaxin for muscle tightness and spasm.  -Patient may continue to supplement with Tylenol; advised to limit total Tylenol dose to less than 3000 mg in 24 hours.    -She may supplement with lidocaine patches and Voltaren gel as needed.  -Due to the patient being on multiple medications that may affect serotonin we will watch closely for serotonin syndrome.  Currently denies any symptoms consistent with serotonin syndrome.  -Given the patient's report of fibromyalgia pain  I discussed with her in detail the recommendations most current with the [employer] in the treatment of fibromyalgia.  This includes most recent research showing that the most effective treatment for fibromyalgia is physical exercise.  Cognitive behavioral therapy has also been shown to have positive effects towards pain and other symptoms related to fibromyalgia.  Adjuvant treatments including acupuncture chiropractic and massage therapy have also been beneficial.  Specifically related to medication the FDA has approved few medications directly for the treatment of fibromyalgia and these include Cymbalta and Savella as well as older medications including Elavil.  Medications outside of the serotonin and norepinephrine realms include muscle relaxants as well as Lyrica and gabapentin.  Most importantly the College's recommendation is to avoid opiate narcotic medication for treating fibromyalgia the evidence shows at these drugs are not  helpful to most people with fibromyalgia and in fact will cause greater pain sensitivity or make pain persistent  -Patient will follow-up approximately 6 months or sooner if needed.              Other Visit Diagnoses         Codes      Long-term use of high-risk medication    -  Primary Z79.899    Relevant Orders    Buprenorphine Confirm,Urine    Tapentadol Confirmation, Urine    Opiate/Opioid/Benzo Prescription Compliance    Opiate Confirmation, Urine                 [1]   Current Outpatient Medications:     amoxicillin-clavulanate (Augmentin) 875-125 mg tablet, Take 1 tablet (875 mg of amoxicillin) by mouth 2 times a day for 10 days., Disp: 20 tablet, Rfl: 0    azelastine (Astelin) 137 mcg (0.1 %) nasal spray, Administer 1 spray into each nostril 2 times a day., Disp: 30 mL, Rfl: 3    buPROPion XL (Wellbutrin XL) 150 mg 24 hr tablet, Take 1 tablet (150 mg) by mouth once daily in the morning. Take before meals., Disp: , Rfl:     cloNIDine (Catapres) 0.2 mg tablet, TAKE ONE (1) TABLET AT BEDTIME, Disp: , Rfl:     cyanocobalamin (Vitamin B-12) 1,000 mcg/mL injection, Inject 1 mL (1,000 mcg) into the muscle every 30 (thirty) days., Disp: 1 mL, Rfl: 11    doxycycline (Adoxa) 150 mg tablet, Take 1 tablet (150 mg) by mouth once daily., Disp: , Rfl:     DULoxetine (Cymbalta) 30 mg DR capsule, Take 1 capsule (30 mg) by mouth once daily. To be taken in combination with duloxetine 60 mg daily for a total of 90 mg daily, Disp: 30 capsule, Rfl: 5    DULoxetine (Cymbalta) 60 mg DR capsule, Take 1 capsule (60 mg) by mouth once daily. To be taken in combination with duloxetine 30 mg daily for total dose of 90 mg daily, Disp: 30 capsule, Rfl: 5    fluticasone (Flonase) 50 mcg/actuation nasal spray, Administer 1 spray into each nostril 2 times a day., Disp: 18.2 g, Rfl: 3    hydrOXYzine pamoate (Vistaril) 25 mg capsule, Take 1 capsule (25 mg) by mouth 3 times a day as needed., Disp: , Rfl:     levothyroxine (Synthroid, Levoxyl) 88  mcg tablet, Take 1 tablet (88 mcg) by mouth early in the morning.. Please take by mouth every other day in an alternating fashion opposite of the 100 mcg, Disp: 90 tablet, Rfl: 0    linaCLOtide (Linzess) 290 mcg capsule, Take 1 capsule (290 mcg) by mouth once daily in the morning. Take before meals. DO NOT CRUSH OR CHEW, Disp: 30 capsule, Rfl: 5    melatonin 10 mg capsule, TAKE 1 CAPSULE Bedtime PRN, Disp: , Rfl:     metaxalone (Skelaxin) 800 mg tablet, TAKE ONE-HALF (1/2) TABLET TWO (2) TIMES A DAY AS NEEDED FOR MUSCLE SPASM, Disp: 30 tablet, Rfl: 2    methylPREDNISolone (Medrol Dospak) 4 mg tablets, Take as directed on package., Disp: 21 tablet, Rfl: 0    pantoprazole (ProtoNix) 40 mg EC tablet, Take 1 tablet (40 mg) by mouth once daily., Disp: 90 tablet, Rfl: 1    pregabalin (Lyrica) 200 mg capsule, Take 1 capsule (200 mg) by mouth 3 times a day., Disp: 90 capsule, Rfl: 5    rosuvastatin (Crestor) 10 mg tablet, Take 1 tablet (10 mg) by mouth once daily., Disp: 90 tablet, Rfl: 1    sodium hyaluronate (Durolane) 60 mg/3 mL injection, INJECTION 3ML, ONE DOSE INTO BOTH KNEES ONE TIME, Disp: 6 mL, Rfl: 0    tirzepatide (Mounjaro) 15 mg/0.5 mL pen injector, Inject 15 mg under the skin 1 (one) time per week., Disp: 6 mL, Rfl: 1    triamcinolone (Kenalog) 0.1 % cream, APPLY TO LEGS TWO (2) TIMES A DAY AS NEEDED, Disp: , Rfl:     Vyvanse 20 mg capsule, Take 1 capsule (20 mg) by mouth once daily. Take in the morning, Disp: , Rfl:   [2]   Past Medical History:  Diagnosis Date    Depression, unspecified     Depressive disorder    Medial meniscus tear 04/24/2023    Ocular rosacea     Personal history of other diseases of the digestive system 01/12/2016    History of esophageal reflux    Personal history of other diseases of the respiratory system 08/13/2018    History of acute sinusitis    Personal history of other endocrine, nutritional and metabolic disease     History of hypothyroidism    Personal history of other  specified conditions     History of insomnia    Prediabetes     Prediabetes    Renal stones     right side with stent    Sacro-iliac pain     Tear of meniscus of right knee 04/24/2023    Ulcerative colitis    [3]   Past Surgical History:  Procedure Laterality Date    APPENDECTOMY  01/12/2016    Appendectomy    CHOLECYSTECTOMY  01/12/2016    Cholecystectomy    HERNIA REPAIR  01/12/2016    Inguinal Hernia Repair    HYSTERECTOMY  11/07/2017    Hysterectomy    MR HEAD ANGIO WO IV CONTRAST  02/06/2019    MR HEAD ANGIO WO IV CONTRAST 2/6/2019 [facility]    MR NECK ANGIO WO IV CONTRAST  02/06/2019    MR NECK ANGIO WO IV CONTRAST 2/6/2019 [facility]    OTHER SURGICAL HISTORY  08/12/2020    Knee arthroscopy    PANCREAS SURGERY      SINUS SURGERY  01/12/2016    Sinus Surgery    SPLENECTOMY, TOTAL  01/12/2016    Splenectomy    TOTAL ABDOMINAL HYSTERECTOMY W/ BILATERAL SALPINGOOPHORECTOMY  01/12/2016    Total Abdominal Hysterectomy With Removal Of Both Ovaries   [4]   Family History  Problem Relation Name Age of Onset    Arthritis Mother      Heart disease Mother      Other (CVA) Mother      Coronary artery disease Mother      Diabetes Mother      Osteoporosis Mother      Heart failure Mother      Kidney failure Mother      Alcohol abuse Father      Hypertension Father      Lung cancer Father      Diabetes Maternal Grandmother      Liver cancer Maternal Grandmother      Throat cancer Maternal Grandfather      Osteoarthritis Other          Grandmother   [5]   Allergies  Allergen Reactions    Bactrim [Sulfamethoxazole-Trimethoprim] GI Upset    Cephalexin GI Upset     GASTROINTESTIAL IRRITATION    Ciprofloxacin GI Upset     Reaction from Community: Other(See Desc)    Darvocet A500 [Propoxyphene N-Acetaminophen] Itching    Nsaids (Non-Steroidal Anti-Inflammatory Drug) Other     Hx of ulcerative colitis    Vicodin [Hydrocodone-Acetaminophen] Itching

## 2025-07-04 LAB
1OH-MIDAZOLAM UR-MCNC: NEGATIVE NG/ML
7AMINOCLONAZEPAM UR-MCNC: NEGATIVE NG/ML
A-OH ALPRAZ UR-MCNC: NEGATIVE NG/ML
A-OH-TRIAZOLAM UR-MCNC: NEGATIVE NG/ML
AMPHET UR-MCNC: 6670 NG/ML
AMPHETAMINES UR QL: POSITIVE NG/ML
BARBITURATES UR QL: NEGATIVE NG/ML
BUPRENORPHINE UR-MCNC: NORMAL NG/ML
BZE UR QL: NEGATIVE NG/ML
CODEINE UR-MCNC: NEGATIVE NG/ML
CREAT UR-MCNC: 171.2 MG/DL
DRUG SCREEN COMMENT UR-IMP: ABNORMAL
DRUG SCREEN COMMENT UR-IMP: NORMAL
EDDP UR-MCNC: NEGATIVE NG/ML
FENTANYL UR-MCNC: ABNORMAL NG/ML
HYDROCODONE UR-MCNC: NEGATIVE NG/ML
HYDROMORPHONE UR-MCNC: NEGATIVE NG/ML
LORAZEPAM UR-MCNC: NEGATIVE NG/ML
METHADONE UR-MCNC: NEGATIVE NG/ML
METHAMPHET UR-MCNC: NEGATIVE NG/ML
MORPHINE UR-MCNC: NEGATIVE NG/ML
NORBUPRENORPHINE UR-MCNC: NORMAL NG/ML
NORDIAZEPAM UR-MCNC: NEGATIVE NG/ML
NORFENTANYL UR-MCNC: ABNORMAL NG/ML
NORHYDROCODONE UR CFM-MCNC: NEGATIVE NG/ML
NOROXYCODONE UR CFM-MCNC: NEGATIVE NG/ML
NORTAPENTADOL UR-MCNC: NEGATIVE NG/ML
NORTRAMADOL UR-MCNC: NEGATIVE NG/ML
OH-ETHYLFLURAZ UR-MCNC: NEGATIVE NG/ML
OXAZEPAM UR-MCNC: NEGATIVE NG/ML
OXIDANTS UR QL: NEGATIVE MCG/ML
OXYCODONE UR CFM-MCNC: NEGATIVE NG/ML
OXYMORPHONE UR CFM-MCNC: NEGATIVE NG/ML
PCP UR QL: NEGATIVE NG/ML
PH UR: 5.9 [PH] (ref 4.5–9)
QUEST 6 ACETYLMORPHINE: NORMAL
QUEST NALOXONE, URINE: NORMAL
QUEST NOTES AND COMMENTS: NORMAL
QUEST PATIENT HISTORICAL REPORT: NORMAL
QUEST ZOLPIDEM: ABNORMAL
TAPENTADOL UR-MCNC: NEGATIVE NG/ML
TEMAZEPAM UR-MCNC: NEGATIVE NG/ML
THC UR QL: NEGATIVE NG/ML
TRAMADOL UR-MCNC: NEGATIVE NG/ML
ZOLPIDEM PHENYL-4-CARB UR CFM-MCNC: ABNORMAL NG/ML

## 2025-07-06 PROCEDURE — RXMED WILLOW AMBULATORY MEDICATION CHARGE

## 2025-07-08 LAB
1OH-MIDAZOLAM UR-MCNC: NEGATIVE NG/ML
7AMINOCLONAZEPAM UR-MCNC: NEGATIVE NG/ML
A-OH ALPRAZ UR-MCNC: NEGATIVE NG/ML
A-OH-TRIAZOLAM UR-MCNC: NEGATIVE NG/ML
AMPHET UR-MCNC: 6670 NG/ML
AMPHETAMINES UR QL: POSITIVE NG/ML
BARBITURATES UR QL: NEGATIVE NG/ML
BUPRENORPHINE UR-MCNC: NEGATIVE NG/ML
BZE UR QL: NEGATIVE NG/ML
CODEINE UR-MCNC: NEGATIVE NG/ML
CREAT UR-MCNC: 171.2 MG/DL
DRUG SCREEN COMMENT UR-IMP: ABNORMAL
DRUG SCREEN COMMENT UR-IMP: NORMAL
EDDP UR-MCNC: NEGATIVE NG/ML
FENTANYL UR-MCNC: NEGATIVE NG/ML
HYDROCODONE UR-MCNC: NEGATIVE NG/ML
HYDROMORPHONE UR-MCNC: NEGATIVE NG/ML
LORAZEPAM UR-MCNC: NEGATIVE NG/ML
METHADONE UR-MCNC: NEGATIVE NG/ML
METHAMPHET UR-MCNC: NEGATIVE NG/ML
MORPHINE UR-MCNC: NEGATIVE NG/ML
NORBUPRENORPHINE UR-MCNC: NEGATIVE NG/ML
NORDIAZEPAM UR-MCNC: NEGATIVE NG/ML
NORFENTANYL UR-MCNC: NEGATIVE NG/ML
NORHYDROCODONE UR CFM-MCNC: NEGATIVE NG/ML
NOROXYCODONE UR CFM-MCNC: NEGATIVE NG/ML
NORTAPENTADOL UR-MCNC: NEGATIVE NG/ML
NORTRAMADOL UR-MCNC: NEGATIVE NG/ML
OH-ETHYLFLURAZ UR-MCNC: NEGATIVE NG/ML
OXAZEPAM UR-MCNC: NEGATIVE NG/ML
OXIDANTS UR QL: NEGATIVE MCG/ML
OXYCODONE UR CFM-MCNC: NEGATIVE NG/ML
OXYMORPHONE UR CFM-MCNC: NEGATIVE NG/ML
PCP UR QL: NEGATIVE NG/ML
PH UR: 5.9 [PH] (ref 4.5–9)
QUEST 6 ACETYLMORPHINE: NEGATIVE NG/ML
QUEST NALOXONE, URINE: NEGATIVE NG/ML
QUEST NOTES AND COMMENTS: NORMAL
QUEST ZOLPIDEM: NEGATIVE NG/ML
TAPENTADOL UR-MCNC: NEGATIVE NG/ML
TEMAZEPAM UR-MCNC: NEGATIVE NG/ML
THC UR QL: NEGATIVE NG/ML
TRAMADOL UR-MCNC: NEGATIVE NG/ML
ZOLPIDEM PHENYL-4-CARB UR CFM-MCNC: NEGATIVE NG/ML

## 2025-07-13 ENCOUNTER — PHARMACY VISIT (OUTPATIENT)
Dept: PHARMACY | Facility: CLINIC | Age: 60
End: 2025-07-13
Payer: COMMERCIAL

## 2025-07-18 ENCOUNTER — APPOINTMENT (OUTPATIENT)
Dept: UROLOGY | Facility: CLINIC | Age: 60
End: 2025-07-18
Payer: COMMERCIAL

## 2025-07-30 PROCEDURE — RXMED WILLOW AMBULATORY MEDICATION CHARGE

## 2025-08-05 ENCOUNTER — PHARMACY VISIT (OUTPATIENT)
Dept: PHARMACY | Facility: CLINIC | Age: 60
End: 2025-08-05
Payer: COMMERCIAL

## 2025-08-14 ENCOUNTER — APPOINTMENT (OUTPATIENT)
Dept: PAIN MEDICINE | Facility: HOSPITAL | Age: 60
End: 2025-08-14
Payer: COMMERCIAL

## 2025-08-26 ENCOUNTER — PREP FOR PROCEDURE (OUTPATIENT)
Dept: PAIN MEDICINE | Facility: HOSPITAL | Age: 60
End: 2025-08-26
Payer: COMMERCIAL

## 2025-08-26 DIAGNOSIS — M54.12 CERVICAL RADICULITIS: Primary | ICD-10-CM

## 2025-08-26 RX ORDER — DIAZEPAM 5 MG/1
5 TABLET ORAL ONCE AS NEEDED
OUTPATIENT
Start: 2025-08-26

## 2025-08-27 ENCOUNTER — APPOINTMENT (OUTPATIENT)
Dept: RADIOLOGY | Facility: HOSPITAL | Age: 60
End: 2025-08-27
Payer: COMMERCIAL

## 2025-08-27 VITALS — WEIGHT: 217 LBS | HEIGHT: 68 IN | BODY MASS INDEX: 32.89 KG/M2

## 2025-08-27 DIAGNOSIS — Z12.31 ENCOUNTER FOR SCREENING MAMMOGRAM FOR MALIGNANT NEOPLASM OF BREAST: ICD-10-CM

## 2025-08-27 DIAGNOSIS — N64.89 BREAST ASYMMETRY: Primary | ICD-10-CM

## 2025-08-27 PROCEDURE — 77067 SCR MAMMO BI INCL CAD: CPT

## 2025-08-27 PROCEDURE — 77063 BREAST TOMOSYNTHESIS BI: CPT | Performed by: RADIOLOGY

## 2025-08-27 PROCEDURE — 77067 SCR MAMMO BI INCL CAD: CPT | Performed by: RADIOLOGY

## 2025-08-28 ENCOUNTER — TELEPHONE (OUTPATIENT)
Dept: PAIN MEDICINE | Facility: HOSPITAL | Age: 60
End: 2025-08-28
Payer: COMMERCIAL

## 2025-08-28 DIAGNOSIS — E03.9 HYPOTHYROIDISM, UNSPECIFIED TYPE: ICD-10-CM

## 2025-08-28 LAB
ALBUMIN SERPL-MCNC: 4.1 G/DL (ref 3.6–5.1)
ALBUMIN/CREAT UR: 13 MG/G CREAT
ALP SERPL-CCNC: 85 U/L (ref 37–153)
ALT SERPL-CCNC: 12 U/L (ref 6–29)
ANION GAP SERPL CALCULATED.4IONS-SCNC: 6 MMOL/L (CALC) (ref 7–17)
AST SERPL-CCNC: 16 U/L (ref 10–35)
BASOPHILS # BLD AUTO: 82 CELLS/UL (ref 0–200)
BASOPHILS NFR BLD AUTO: 1.2 %
BILIRUB SERPL-MCNC: 0.4 MG/DL (ref 0.2–1.2)
BUN SERPL-MCNC: 14 MG/DL (ref 7–25)
CALCIUM SERPL-MCNC: 9.1 MG/DL (ref 8.6–10.4)
CHLORIDE SERPL-SCNC: 109 MMOL/L (ref 98–110)
CHOLEST SERPL-MCNC: 170 MG/DL
CHOLEST/HDLC SERPL: 2.9 (CALC)
CO2 SERPL-SCNC: 28 MMOL/L (ref 20–32)
CREAT SERPL-MCNC: 0.75 MG/DL (ref 0.5–1.05)
CREAT UR-MCNC: 214 MG/DL (ref 20–275)
EGFRCR SERPLBLD CKD-EPI 2021: 91 ML/MIN/1.73M2
EOSINOPHIL # BLD AUTO: 299 CELLS/UL (ref 15–500)
EOSINOPHIL NFR BLD AUTO: 4.4 %
ERYTHROCYTE [DISTWIDTH] IN BLOOD BY AUTOMATED COUNT: 13.7 % (ref 11–15)
EST. AVERAGE GLUCOSE BLD GHB EST-MCNC: 111 MG/DL
EST. AVERAGE GLUCOSE BLD GHB EST-SCNC: 6.2 MMOL/L
GLUCOSE SERPL-MCNC: 82 MG/DL (ref 65–99)
HBA1C MFR BLD: 5.5 %
HCT VFR BLD AUTO: 39.3 % (ref 35–45)
HDLC SERPL-MCNC: 58 MG/DL
HGB BLD-MCNC: 13.1 G/DL (ref 11.7–15.5)
LDLC SERPL CALC-MCNC: 92 MG/DL (CALC)
LYMPHOCYTES # BLD AUTO: 3121 CELLS/UL (ref 850–3900)
LYMPHOCYTES NFR BLD AUTO: 45.9 %
MAGNESIUM SERPL-MCNC: 2.2 MG/DL (ref 1.5–2.5)
MCH RBC QN AUTO: 32.8 PG (ref 27–33)
MCHC RBC AUTO-ENTMCNC: 33.3 G/DL (ref 32–36)
MCV RBC AUTO: 98.5 FL (ref 80–100)
MICROALBUMIN UR-MCNC: 2.8 MG/DL
MONOCYTES # BLD AUTO: 605 CELLS/UL (ref 200–950)
MONOCYTES NFR BLD AUTO: 8.9 %
NEUTROPHILS # BLD AUTO: 2693 CELLS/UL (ref 1500–7800)
NEUTROPHILS NFR BLD AUTO: 39.6 %
NONHDLC SERPL-MCNC: 112 MG/DL (CALC)
PLATELET # BLD AUTO: 228 THOUSAND/UL (ref 140–400)
PMV BLD REES-ECKER: 10.9 FL (ref 7.5–12.5)
POTASSIUM SERPL-SCNC: 4.2 MMOL/L (ref 3.5–5.3)
PROT SERPL-MCNC: 6.9 G/DL (ref 6.1–8.1)
RBC # BLD AUTO: 3.99 MILLION/UL (ref 3.8–5.1)
SODIUM SERPL-SCNC: 143 MMOL/L (ref 135–146)
TRIGL SERPL-MCNC: 108 MG/DL
TSH SERPL-ACNC: 0.61 MIU/L (ref 0.4–4.5)
VIT B12 SERPL-MCNC: 260 PG/ML (ref 200–1100)
WBC # BLD AUTO: 6.8 THOUSAND/UL (ref 3.8–10.8)

## 2025-08-28 RX ORDER — LEVOTHYROXINE SODIUM 88 UG/1
88 TABLET ORAL DAILY
Qty: 90 TABLET | Refills: 1 | Status: SHIPPED | OUTPATIENT
Start: 2025-08-28 | End: 2026-02-24

## 2025-08-29 ENCOUNTER — APPOINTMENT (OUTPATIENT)
Dept: ORTHOPEDIC SURGERY | Facility: CLINIC | Age: 60
End: 2025-08-29
Payer: COMMERCIAL

## 2025-08-29 DIAGNOSIS — M17.0 ARTHRITIS OF BOTH KNEES: Primary | ICD-10-CM

## 2025-08-29 PROCEDURE — 3048F LDL-C <100 MG/DL: CPT

## 2025-08-29 PROCEDURE — 99214 OFFICE O/P EST MOD 30 MIN: CPT

## 2025-08-29 PROCEDURE — 20610 DRAIN/INJ JOINT/BURSA W/O US: CPT

## 2025-08-29 PROCEDURE — 3044F HG A1C LEVEL LT 7.0%: CPT

## 2025-08-29 RX ORDER — TRIAMCINOLONE ACETONIDE 40 MG/ML
2.5 INJECTION, SUSPENSION INTRA-ARTICULAR; INTRAMUSCULAR
Status: COMPLETED | OUTPATIENT
Start: 2025-08-29 | End: 2025-08-29

## 2025-08-29 RX ADMIN — TRIAMCINOLONE ACETONIDE 2.5 MG: 40 INJECTION, SUSPENSION INTRA-ARTICULAR; INTRAMUSCULAR at 15:12

## 2025-08-29 ASSESSMENT — PAIN SCALES - GENERAL: PAINLEVEL_OUTOF10: 7

## 2025-08-29 ASSESSMENT — PAIN - FUNCTIONAL ASSESSMENT: PAIN_FUNCTIONAL_ASSESSMENT: 0-10

## 2025-10-14 ENCOUNTER — APPOINTMENT (OUTPATIENT)
Dept: PRIMARY CARE | Facility: CLINIC | Age: 60
End: 2025-10-14
Payer: COMMERCIAL

## (undated) DEVICE — TUBING, SUCTION, NON-CONDUCTIVE, W/CONNECT,.25 IN X 12 FT, STERILE, LF

## (undated) DEVICE — GUIDEWIRE, DUAL SENSOR, .035 X 150 STRAIGHT,  3CM

## (undated) DEVICE — BAG, PRESSURE INFUSER, 3000 CC, LF

## (undated) DEVICE — TOWEL, SURGICAL, NEURO, O/R, 16 X 26, BLUE, STERILE

## (undated) DEVICE — COLLECTION BAG, FLUID, NON-STERILE

## (undated) DEVICE — BASKET, STONE, RETRIEVAL, NITINOL (4WIRE, 1.9 0 TIP)

## (undated) DEVICE — FIBER, MOSES 200 DFL

## (undated) DEVICE — Device